# Patient Record
Sex: FEMALE | Race: BLACK OR AFRICAN AMERICAN | NOT HISPANIC OR LATINO | Employment: FULL TIME | URBAN - METROPOLITAN AREA
[De-identification: names, ages, dates, MRNs, and addresses within clinical notes are randomized per-mention and may not be internally consistent; named-entity substitution may affect disease eponyms.]

---

## 2019-10-24 ENCOUNTER — TELEPHONE (OUTPATIENT)
Dept: OBSTETRICS AND GYNECOLOGY | Facility: CLINIC | Age: 34
End: 2019-10-24

## 2019-10-24 ENCOUNTER — OFFICE VISIT (OUTPATIENT)
Dept: OBSTETRICS AND GYNECOLOGY | Facility: CLINIC | Age: 34
End: 2019-10-24
Payer: COMMERCIAL

## 2019-10-24 VITALS
WEIGHT: 163.56 LBS | BODY MASS INDEX: 27.25 KG/M2 | HEIGHT: 65 IN | SYSTOLIC BLOOD PRESSURE: 116 MMHG | DIASTOLIC BLOOD PRESSURE: 72 MMHG

## 2019-10-24 DIAGNOSIS — Z01.419 ENCOUNTER FOR GYNECOLOGICAL EXAMINATION (GENERAL) (ROUTINE) WITHOUT ABNORMAL FINDINGS: ICD-10-CM

## 2019-10-24 DIAGNOSIS — L29.2 VULVAR ITCHING: Primary | ICD-10-CM

## 2019-10-24 DIAGNOSIS — Z30.09 ENCOUNTER FOR OTHER GENERAL COUNSELING OR ADVICE ON CONTRACEPTION: ICD-10-CM

## 2019-10-24 DIAGNOSIS — Z12.4 SCREENING FOR CERVICAL CANCER: ICD-10-CM

## 2019-10-24 PROCEDURE — 88175 CYTOPATH C/V AUTO FLUID REDO: CPT

## 2019-10-24 PROCEDURE — 99385 PR PREVENTIVE VISIT,NEW,18-39: ICD-10-PCS | Mod: S$GLB,,, | Performed by: OBSTETRICS & GYNECOLOGY

## 2019-10-24 PROCEDURE — 87481 CANDIDA DNA AMP PROBE: CPT | Mod: 59

## 2019-10-24 PROCEDURE — 99999 PR PBB SHADOW E&M-NEW PATIENT-LVL III: CPT | Mod: PBBFAC,,, | Performed by: OBSTETRICS & GYNECOLOGY

## 2019-10-24 PROCEDURE — 99999 PR PBB SHADOW E&M-NEW PATIENT-LVL III: ICD-10-PCS | Mod: PBBFAC,,, | Performed by: OBSTETRICS & GYNECOLOGY

## 2019-10-24 PROCEDURE — 99385 PREV VISIT NEW AGE 18-39: CPT | Mod: S$GLB,,, | Performed by: OBSTETRICS & GYNECOLOGY

## 2019-10-24 PROCEDURE — 87661 TRICHOMONAS VAGINALIS AMPLIF: CPT

## 2019-10-24 PROCEDURE — 87801 DETECT AGNT MULT DNA AMPLI: CPT

## 2019-10-24 RX ORDER — NYSTATIN AND TRIAMCINOLONE ACETONIDE 100000; 1 [USP'U]/G; MG/G
CREAM TOPICAL
Qty: 60 G | Refills: 1 | Status: SHIPPED | OUTPATIENT
Start: 2019-10-24 | End: 2021-11-11

## 2019-10-24 RX ORDER — METFORMIN HYDROCHLORIDE 500 MG/1
500 TABLET ORAL 2 TIMES DAILY WITH MEALS
COMMUNITY
End: 2019-10-31 | Stop reason: SINTOL

## 2019-10-24 RX ORDER — FLUCONAZOLE 200 MG/1
200 TABLET ORAL DAILY
Qty: 3 TABLET | Refills: 0 | Status: SHIPPED | OUTPATIENT
Start: 2019-10-24 | End: 2019-10-27

## 2019-10-24 NOTE — PROGRESS NOTES
Subjective:       Patient ID: Wilberto Roger is a 33 y.o. female.    Chief Complaint:  Well Woman      History of Present Illness  HPI  Annual Exam-Premenopausal  Patient presents for annual exam. The patient c/o yeast infection--used 3 d monistat; --feels vulvar itching. Also interested in starting contraception; The patient is sexually active--no condoms, current partner of 6 yrs; . GYN screening history: last pap: was normal and patient does not recall when last pap was. The patient wears seatbelts: yes. The patient participates in regular exercise: yes. --walks daily 1 hr; Has the patient ever been transfused or tattooed?: yes. --+tattooes; The patient reports that there is not domestic violence in her life.      Stopped depo--2019; had 2 menses in October; flow 2 days, 5 days; (prior to depo--cycles 3 days); pads-overnight pad --change q 4 hrs (no double up); min dysmenorrhea;     Denies urinary leakage with cough/sneeze    Works for [employer]    Had gest dm with 3 yr old son and currently dx with diabetes but cannot take metformin consistently due to diarrhea from metformin and her job  GYN & OB History  Patient's last menstrual period was 10/20/2019 (exact date).   Date of Last Pap: No result found    OB History    Para Term  AB Living   1 1 1     1   SAB TAB Ectopic Multiple Live Births           1      # Outcome Date GA Lbr Eliceo/2nd Weight Sex Delivery Anes PTL Lv   1 Term      Vag-Spont   MISSY       Review of Systems  Review of Systems   Genitourinary: Positive for menstrual problem and vaginal discharge.   All other systems reviewed and are negative.          Objective:      Physical Exam:   Constitutional: She appears well-developed.     Eyes: Pupils are equal, round, and reactive to light. Conjunctivae and EOM are normal.    Neck: Normal range of motion. Neck supple.     Pulmonary/Chest: Effort normal. Right breast exhibits no mass, no nipple discharge, no skin change and no  tenderness. Left breast exhibits no mass, no nipple discharge, no skin change and no tenderness. Breasts are symmetrical.        Abdominal: Soft.     Genitourinary: Rectum normal, vagina normal and uterus normal. Pelvic exam was performed with patient supine. Cervix is normal. Right adnexum displays no mass and no tenderness. Left adnexum displays no mass and no tenderness. No erythema, bleeding, rectocele, cystocele or unspecified prolapse of vaginal walls in the vagina. No vaginal discharge (menstrual like blood in vagina, thick discharge) found. Labial bartholins normal.       Uterus Size: 6 cm   Musculoskeletal: Normal range of motion.       Neurological: She is alert.    Skin: Skin is warm.    Psychiatric: She has a normal mood and affect.           Assessment:     Encounter Diagnoses   Name Primary?    Encounter for gynecological examination (general) (routine) without abnormal findings     Screening for cervical cancer     Vulvar itching Yes    Encounter for other general counseling or advice on contraception                  Plan:      Continue annual well woman exam.  Pap today; Reviewed updated recommendations for pap smears (every 3 years) in low risk patients.   Recommend annual pelvic exams.  Reviewed recommendations for annual CBE.  Reviewed contraceptive options--ocp, depo, nuva ring, nexplanon, iud, patch  Reviewed uses of each, risks and benefits.  Pt elects trial of nexplanon, orders faxed; safe sex; return on menses for insertion  Continue menstrual calendar  Mycolog/dilfucan for vulvar irritation  Advised avoid sugary drinks, follow diabetic diet  Pt to f/u with dr fowler for pcp  Continue diet, exercise, weight loss

## 2019-10-25 LAB
BACTERIAL VAGINOSIS DNA: NEGATIVE
CANDIDA GLABRATA DNA: NEGATIVE
CANDIDA KRUSEI DNA: NEGATIVE
CANDIDA RRNA VAG QL PROBE: POSITIVE
T VAGINALIS RRNA GENITAL QL PROBE: NEGATIVE

## 2019-10-28 ENCOUNTER — TELEPHONE (OUTPATIENT)
Dept: OBSTETRICS AND GYNECOLOGY | Facility: CLINIC | Age: 34
End: 2019-10-28

## 2019-10-28 DIAGNOSIS — B37.31 YEAST VAGINITIS: Primary | ICD-10-CM

## 2019-10-28 RX ORDER — FLUCONAZOLE 200 MG/1
200 TABLET ORAL DAILY
Qty: 3 TABLET | Refills: 0 | Status: SHIPPED | OUTPATIENT
Start: 2019-10-28 | End: 2019-10-31

## 2019-10-28 NOTE — TELEPHONE ENCOUNTER
----- Message from Taisha Uribe MD sent at 10/28/2019  8:36 AM CDT -----  The   vaginal culture is positive for yeast, a rx has been sent

## 2019-10-28 NOTE — TELEPHONE ENCOUNTER
Spoke to patient and let her know that her vaginal culture is positive for yeast, rx has been sent to pharmacy. Verified pharmacy. Patient verbalized understanding.

## 2019-10-31 ENCOUNTER — LAB VISIT (OUTPATIENT)
Dept: LAB | Facility: HOSPITAL | Age: 34
End: 2019-10-31
Attending: INTERNAL MEDICINE
Payer: COMMERCIAL

## 2019-10-31 ENCOUNTER — OFFICE VISIT (OUTPATIENT)
Dept: INTERNAL MEDICINE | Facility: CLINIC | Age: 34
End: 2019-10-31
Payer: COMMERCIAL

## 2019-10-31 VITALS
DIASTOLIC BLOOD PRESSURE: 79 MMHG | HEIGHT: 65 IN | SYSTOLIC BLOOD PRESSURE: 127 MMHG | TEMPERATURE: 98 F | HEART RATE: 91 BPM | OXYGEN SATURATION: 100 % | BODY MASS INDEX: 27.9 KG/M2 | RESPIRATION RATE: 20 BRPM | WEIGHT: 167.44 LBS

## 2019-10-31 DIAGNOSIS — E11.9 DIABETES MELLITUS TYPE 2 IN NONOBESE: ICD-10-CM

## 2019-10-31 DIAGNOSIS — Z76.89 ENCOUNTER TO ESTABLISH CARE: ICD-10-CM

## 2019-10-31 DIAGNOSIS — Z76.89 ENCOUNTER TO ESTABLISH CARE: Primary | ICD-10-CM

## 2019-10-31 LAB
25(OH)D3+25(OH)D2 SERPL-MCNC: 10 NG/ML (ref 30–96)
ALBUMIN SERPL BCP-MCNC: 3.5 G/DL (ref 3.5–5.2)
ALP SERPL-CCNC: 123 U/L (ref 55–135)
ALT SERPL W/O P-5'-P-CCNC: 14 U/L (ref 10–44)
ANION GAP SERPL CALC-SCNC: 13 MMOL/L (ref 8–16)
AST SERPL-CCNC: 13 U/L (ref 10–40)
BASOPHILS # BLD AUTO: 0.03 K/UL (ref 0–0.2)
BASOPHILS NFR BLD: 0.5 % (ref 0–1.9)
BILIRUB SERPL-MCNC: 0.3 MG/DL (ref 0.1–1)
BUN SERPL-MCNC: 6 MG/DL (ref 6–20)
CALCIUM SERPL-MCNC: 9.7 MG/DL (ref 8.7–10.5)
CHLORIDE SERPL-SCNC: 97 MMOL/L (ref 95–110)
CHOLEST SERPL-MCNC: 358 MG/DL (ref 120–199)
CHOLEST/HDLC SERPL: 9.4 {RATIO} (ref 2–5)
CO2 SERPL-SCNC: 23 MMOL/L (ref 23–29)
CREAT SERPL-MCNC: 0.8 MG/DL (ref 0.5–1.4)
DIFFERENTIAL METHOD: ABNORMAL
EOSINOPHIL # BLD AUTO: 0.1 K/UL (ref 0–0.5)
EOSINOPHIL NFR BLD: 1 % (ref 0–8)
ERYTHROCYTE [DISTWIDTH] IN BLOOD BY AUTOMATED COUNT: 12.5 % (ref 11.5–14.5)
EST. GFR  (AFRICAN AMERICAN): >60 ML/MIN/1.73 M^2
EST. GFR  (NON AFRICAN AMERICAN): >60 ML/MIN/1.73 M^2
ESTIMATED AVG GLUCOSE: ABNORMAL MG/DL (ref 68–131)
GLUCOSE SERPL-MCNC: 391 MG/DL (ref 70–110)
HBA1C MFR BLD HPLC: >14 % (ref 4–5.6)
HCT VFR BLD AUTO: 41.2 % (ref 37–48.5)
HDLC SERPL-MCNC: 38 MG/DL (ref 40–75)
HDLC SERPL: 10.6 % (ref 20–50)
HGB BLD-MCNC: 12.2 G/DL (ref 12–16)
IMM GRANULOCYTES # BLD AUTO: 0.01 K/UL (ref 0–0.04)
IMM GRANULOCYTES NFR BLD AUTO: 0.2 % (ref 0–0.5)
LDLC SERPL CALC-MCNC: ABNORMAL MG/DL (ref 63–159)
LYMPHOCYTES # BLD AUTO: 2.5 K/UL (ref 1–4.8)
LYMPHOCYTES NFR BLD: 43.8 % (ref 18–48)
MCH RBC QN AUTO: 22.4 PG (ref 27–31)
MCHC RBC AUTO-ENTMCNC: 29.6 G/DL (ref 32–36)
MCV RBC AUTO: 76 FL (ref 82–98)
MONOCYTES # BLD AUTO: 0.6 K/UL (ref 0.3–1)
MONOCYTES NFR BLD: 9.7 % (ref 4–15)
NEUTROPHILS # BLD AUTO: 2.6 K/UL (ref 1.8–7.7)
NEUTROPHILS NFR BLD: 44.8 % (ref 38–73)
NONHDLC SERPL-MCNC: 320 MG/DL
NRBC BLD-RTO: 0 /100 WBC
PLATELET # BLD AUTO: 432 K/UL (ref 150–350)
PMV BLD AUTO: 11 FL (ref 9.2–12.9)
POTASSIUM SERPL-SCNC: 4.5 MMOL/L (ref 3.5–5.1)
PROT SERPL-MCNC: 7.7 G/DL (ref 6–8.4)
RBC # BLD AUTO: 5.45 M/UL (ref 4–5.4)
SODIUM SERPL-SCNC: 133 MMOL/L (ref 136–145)
TRIGL SERPL-MCNC: 1261 MG/DL (ref 30–150)
WBC # BLD AUTO: 5.78 K/UL (ref 3.9–12.7)

## 2019-10-31 PROCEDURE — 99999 PR PBB SHADOW E&M-EST. PATIENT-LVL III: ICD-10-PCS | Mod: PBBFAC,,, | Performed by: INTERNAL MEDICINE

## 2019-10-31 PROCEDURE — 85025 COMPLETE CBC W/AUTO DIFF WBC: CPT

## 2019-10-31 PROCEDURE — 80053 COMPREHEN METABOLIC PANEL: CPT

## 2019-10-31 PROCEDURE — 80061 LIPID PANEL: CPT

## 2019-10-31 PROCEDURE — 99204 OFFICE O/P NEW MOD 45 MIN: CPT | Mod: S$GLB,,, | Performed by: INTERNAL MEDICINE

## 2019-10-31 PROCEDURE — 3008F PR BODY MASS INDEX (BMI) DOCUMENTED: ICD-10-PCS | Mod: CPTII,S$GLB,, | Performed by: INTERNAL MEDICINE

## 2019-10-31 PROCEDURE — 99204 PR OFFICE/OUTPT VISIT, NEW, LEVL IV, 45-59 MIN: ICD-10-PCS | Mod: S$GLB,,, | Performed by: INTERNAL MEDICINE

## 2019-10-31 PROCEDURE — 36415 COLL VENOUS BLD VENIPUNCTURE: CPT | Mod: PO

## 2019-10-31 PROCEDURE — 82306 VITAMIN D 25 HYDROXY: CPT

## 2019-10-31 PROCEDURE — 3008F BODY MASS INDEX DOCD: CPT | Mod: CPTII,S$GLB,, | Performed by: INTERNAL MEDICINE

## 2019-10-31 PROCEDURE — 99999 PR PBB SHADOW E&M-EST. PATIENT-LVL III: CPT | Mod: PBBFAC,,, | Performed by: INTERNAL MEDICINE

## 2019-10-31 PROCEDURE — 83036 HEMOGLOBIN GLYCOSYLATED A1C: CPT

## 2019-10-31 RX ORDER — METFORMIN HYDROCHLORIDE 1000 MG/1
1000 TABLET, FILM COATED, EXTENDED RELEASE ORAL
Qty: 90 TABLET | Refills: 3 | Status: SHIPPED | OUTPATIENT
Start: 2019-10-31 | End: 2019-12-16 | Stop reason: DRUGHIGH

## 2019-10-31 NOTE — PROGRESS NOTES
Subjective:      Patient ID: Wilberto Roger is a 33 y.o. female.    Chief Complaint: [facility] Care and Diabetes      Ms. Wilberto Roger is a patient of [facility] in [place], [place], who presents to [facility] primary care.    HPI     She reports having DM2 dx'd 1 year ago, at which time she was prescribed metformin 500 mg BID, which caused diarrhea, so she stopped it and was never started on another medication. No increased hunger, but endorses polyuria. No dysuria.       Past Medical History:   Diagnosis Date    Diabetes mellitus type 2 in nonobese 10/31/2018    Stopped metformin 2/2 diarrhea     History reviewed. No pertinent surgical history.  Social History     Socioeconomic History    Marital status: Single     Spouse name: Not on file    Number of children: Not on file    Years of education: Not on file    Highest education level: Not on file   Occupational History    Not on file   Social Needs    Financial resource strain: Not on file    Food insecurity:     Worry: Not on file     Inability: Not on file    Transportation needs:     Medical: Not on file     Non-medical: Not on file   Tobacco Use    Smoking status: Never Smoker    Smokeless tobacco: Never Used   Substance and Sexual Activity    Alcohol use: Never     Frequency: Never    Drug use: Never    Sexual activity: Yes     Partners: Male     Birth control/protection: None   Lifestyle    Physical activity:     Days per week: Not on file     Minutes per session: Not on file    Stress: Not on file   Relationships    Social connections:     Talks on phone: Not on file     Gets together: Not on file     Attends Anabaptism service: Not on file     Active member of club or organization: Not on file     Attends meetings of clubs or organizations: Not on file     Relationship status: Not on file   Other Topics Concern    Not on file   Social History Narrative    Patient goal(s): Weight 150 lb by 4/30/2020    Motivation: Wants to be more  "active with her 3 year old and cure dm2 and be around long-term for him     Breakfast: 10%: croissant, egg & cheese or egg & cheese wrap; Powerade    Lunch: Pork chops, gravy & peas; water    Dinner: Fennel cake; lemonade    Snacks: None    Eating out: 3x/wk    Water (oz/day): 84 oz/d    Physical Activity: walks 1 hr/d at work     Goals    None       Family History   Problem Relation Age of Onset    Diabetes Father     Blindness Father     Breast cancer Maternal Grandmother     Breast cancer Maternal Aunt 45       Current Outpatient Medications:     fluconazole (DIFLUCAN) 200 MG Tab, Take 1 tablet (200 mg total) by mouth once daily. for 3 doses, Disp: 3 tablet, Rfl: 0    nystatin-triamcinolone (MYCOLOG II) cream, Apply to affected area 2 times daily, Disp: 60 g, Rfl: 1    metFORMIN (GLUMETZA) 1000 MG (MOD) 24 hr tablet, Take 1 tablet (1,000 mg total) by mouth daily with breakfast., Disp: 90 tablet, Rfl: 3    Review of patient's allergies indicates:   Allergen Reactions    Strawberries [strawberry]        Review of Systems   All remaining systems negative    Objective:     /79   Pulse 91   Temp 98 °F (36.7 °C) (Oral)   Resp 20   Ht 5' 5" (1.651 m)   Wt 76 kg (167 lb 7 oz)   LMP 10/20/2019 (Exact Date)   SpO2 100%   BMI 27.86 kg/m²     Physical Exam  GEN: A&O fully, NAD  PSYC: Normal affect      Assessment:      1. Diabetes mellitus type 2 in nonobese: Will check labs today. Risks and benefits discussed and patient chose to move forward with long acting metformin 1000 mg 1 po qd.         Plan:   Encounter to establish care  -     Hemoglobin A1c; Future; Expected date: 10/31/2019  -     CBC auto differential; Future; Expected date: 10/31/2019  -     Comprehensive metabolic panel; Future; Expected date: 10/31/2019  -     Lipid panel; Future; Expected date: 10/31/2019  -     Vitamin D; Future; Expected date: 10/31/2019    Diabetes mellitus type 2 in nonobese  -     metFORMIN (GLUMETZA) 1000 MG (MOD) " 24 hr tablet; Take 1 tablet (1,000 mg total) by mouth daily with breakfast.  Dispense: 90 tablet; Refill: 3        Follow up in about 6 weeks (around 12/12/2019), or if symptoms worsen or fail to improve, for Annual.      I spent >45 minutes of time with patient 50% or more of which was discussing labs and plans of care.

## 2019-11-01 DIAGNOSIS — E08.65 DIABETES MELLITUS DUE TO UNDERLYING CONDITION, UNCONTROLLED, WITH HYPERGLYCEMIA: Primary | ICD-10-CM

## 2019-11-01 PROBLEM — E78.1 HYPERTRIGLYCERIDEMIA: Status: ACTIVE | Noted: 2019-11-01

## 2019-11-01 PROBLEM — E11.9 DIABETES MELLITUS TYPE 2 IN NONOBESE: Status: ACTIVE | Noted: 2018-10-31

## 2019-11-01 RX ORDER — ATORVASTATIN CALCIUM 20 MG/1
20 TABLET, FILM COATED ORAL DAILY
Qty: 30 TABLET | Refills: 1 | Status: SHIPPED | OUTPATIENT
Start: 2019-11-01 | End: 2020-06-23 | Stop reason: SDUPTHER

## 2019-11-01 RX ORDER — INSULIN GLARGINE 100 [IU]/ML
7 INJECTION, SOLUTION SUBCUTANEOUS NIGHTLY
Qty: 15 ML | Refills: 11 | Status: SHIPPED | OUTPATIENT
Start: 2019-11-01 | End: 2020-06-23

## 2019-11-04 ENCOUNTER — TELEPHONE (OUTPATIENT)
Dept: OBSTETRICS AND GYNECOLOGY | Facility: CLINIC | Age: 34
End: 2019-11-04

## 2019-11-07 ENCOUNTER — TELEPHONE (OUTPATIENT)
Dept: OBSTETRICS AND GYNECOLOGY | Facility: CLINIC | Age: 34
End: 2019-11-07

## 2019-11-07 NOTE — TELEPHONE ENCOUNTER
Received paperwork that pt.'s nexplanon is not covered un [facility] pharmacy they are forwarding it to [facility] for processing. Scanned letter into the pt.'s chart. slee,lpn

## 2019-11-11 ENCOUNTER — TELEPHONE (OUTPATIENT)
Dept: OBSTETRICS AND GYNECOLOGY | Facility: CLINIC | Age: 34
End: 2019-11-11

## 2019-11-11 NOTE — TELEPHONE ENCOUNTER
----- Message from Estefania Glass sent at 11/11/2019  3:06 PM CST -----  Contact: Davina harris/Keli  Calling concerning scheduling delivery of Nexplanon device for patient. Please call @ [phone] Option 2 7:am - 7:pm m-f. To schedule appointment. Thanks, sarah

## 2019-12-16 ENCOUNTER — OFFICE VISIT (OUTPATIENT)
Dept: INTERNAL MEDICINE | Facility: CLINIC | Age: 34
End: 2019-12-16
Payer: COMMERCIAL

## 2019-12-16 VITALS
DIASTOLIC BLOOD PRESSURE: 76 MMHG | HEART RATE: 90 BPM | SYSTOLIC BLOOD PRESSURE: 128 MMHG | WEIGHT: 174.63 LBS | TEMPERATURE: 98 F | HEIGHT: 65 IN | BODY MASS INDEX: 29.09 KG/M2 | OXYGEN SATURATION: 98 %

## 2019-12-16 DIAGNOSIS — E11.9 DIABETES MELLITUS TYPE 2 IN NONOBESE: Primary | ICD-10-CM

## 2019-12-16 PROCEDURE — 82043 UR ALBUMIN QUANTITATIVE: CPT

## 2019-12-16 PROCEDURE — 3008F BODY MASS INDEX DOCD: CPT | Mod: CPTII,S$GLB,, | Performed by: INTERNAL MEDICINE

## 2019-12-16 PROCEDURE — 99999 PR PBB SHADOW E&M-EST. PATIENT-LVL III: CPT | Mod: PBBFAC,,, | Performed by: INTERNAL MEDICINE

## 2019-12-16 PROCEDURE — 99214 PR OFFICE/OUTPT VISIT, EST, LEVL IV, 30-39 MIN: ICD-10-PCS | Mod: S$GLB,,, | Performed by: INTERNAL MEDICINE

## 2019-12-16 PROCEDURE — 99214 OFFICE O/P EST MOD 30 MIN: CPT | Mod: S$GLB,,, | Performed by: INTERNAL MEDICINE

## 2019-12-16 PROCEDURE — 99999 PR PBB SHADOW E&M-EST. PATIENT-LVL III: ICD-10-PCS | Mod: PBBFAC,,, | Performed by: INTERNAL MEDICINE

## 2019-12-16 PROCEDURE — 3008F PR BODY MASS INDEX (BMI) DOCUMENTED: ICD-10-PCS | Mod: CPTII,S$GLB,, | Performed by: INTERNAL MEDICINE

## 2019-12-16 RX ORDER — METFORMIN HYDROCHLORIDE 500 MG/1
TABLET, EXTENDED RELEASE ORAL
Refills: 3 | COMMUNITY
Start: 2019-10-31 | End: 2019-12-16 | Stop reason: DRUGHIGH

## 2019-12-16 RX ORDER — METFORMIN HYDROCHLORIDE 850 MG/1
850 TABLET ORAL 2 TIMES DAILY WITH MEALS
Qty: 180 TABLET | Refills: 3 | Status: SHIPPED | OUTPATIENT
Start: 2019-12-16 | End: 2021-11-11

## 2019-12-16 RX ORDER — PEN NEEDLE, DIABETIC 32GX 5/32"
NEEDLE, DISPOSABLE MISCELLANEOUS
Refills: 0 | COMMUNITY
Start: 2019-11-05 | End: 2021-09-09 | Stop reason: SDUPTHER

## 2019-12-16 NOTE — PROGRESS NOTES
Subjective:      Patient ID: Wilberto Roger is a 34 y.o. female.    Chief Complaint: Annual Exam      HPI     Ms. Wilberto Roger is a patient of Gregorio Burton MD, who presents for Annual Exam    She reports now taking 35 U Lantus at night, which has resulted in FBGs in the 90's as of late.     VS, labs & imaging reviewed and discussed with patient, including A1c >14% on 10/31/19.    Of note, EPIC indicates due preventive measures, including EYE, which she plans to schedule and FOOT she prefers to defer until next visit.       Past Medical History:   Diagnosis Date    Diabetes mellitus type 2 in nonobese 10/31/2018    A1c >14% as of 11/1/19; Stopped metformin 2/2 diarrhea -> Lantus & atorvastatin on 11/1/19    Hypertriglyceridemia 11/01/2019    TG 1,261 mg/dL, for which we will consider fenofibrate if atorvastatin doesn't causes significant transaminitis (will also consider Lovaza)    Overweight (BMI 25.0-29.9)      History reviewed. No pertinent surgical history.  Social History     Socioeconomic History    Marital status: Single     Spouse name: Not on file    Number of children: Not on file    Years of education: Not on file    Highest education level: Not on file   Occupational History    Not on file   Social Needs    Financial resource strain: Not on file    Food insecurity:     Worry: Not on file     Inability: Not on file    Transportation needs:     Medical: Not on file     Non-medical: Not on file   Tobacco Use    Smoking status: Never Smoker    Smokeless tobacco: Never Used   Substance and Sexual Activity    Alcohol use: Never     Frequency: Never    Drug use: Never    Sexual activity: Yes     Partners: Male     Birth control/protection: None   Lifestyle    Physical activity:     Days per week: Not on file     Minutes per session: Not on file    Stress: Not on file   Relationships    Social connections:     Talks on phone: Not on file     Gets together: Not on file     Attends [employer]  "service: Not on file     Active member of club or organization: Not on file     Attends meetings of clubs or organizations: Not on file     Relationship status: Not on file   Other Topics Concern    Not on file   Social History Narrative    Patient goal(s): Weight 150 lb by 4/30/2020    Motivation: Wants to be more active with her 3 year old and cure dm2 and be around long-term for him     Breakfast: 10%: croissant, egg & cheese or egg & cheese wrap; Powerade    Lunch: Pork chops, gravy & peas; water    Dinner: Fennel cake; lemonade (previously Cokes: 5 cans/d)    Snacks: None    Eating out: 3x/wk    Water (oz/day): 84 oz/d    Physical Activity: walks 1 hr/d at work     Family History   Problem Relation Age of Onset    Diabetes Father     Blindness Father     Breast cancer Maternal Grandmother     Breast cancer Maternal Aunt 45       Current Outpatient Medications:     atorvastatin (LIPITOR) 20 MG tablet, Take 1 tablet (20 mg total) by mouth once daily., Disp: 30 tablet, Rfl: 1    insulin (LANTUS SOLOSTAR U-100 INSULIN) glargine 100 units/mL (3mL) SubQ pen, Inject 7 Units into the skin every evening. If 7 day ave. FBG>140, increase by 7U for 7 days. If 3 day aFBG<70, decrease by 3U for 3 days., Disp: 15 mL, Rfl: 11    nystatin-triamcinolone (MYCOLOG II) cream, Apply to affected area 2 times daily, Disp: 60 g, Rfl: 1    ULTICARE PEN NEEDLE 32 gauge x 5/32" Ndle, USE DAILY WITH INSULIN, Disp: , Rfl: 0    metFORMIN (GLUCOPHAGE) 850 MG tablet, Take 1 tablet (850 mg total) by mouth 2 (two) times daily with meals., Disp: 180 tablet, Rfl: 3    Review of patient's allergies indicates:   Allergen Reactions    Strawberries [strawberry]         Review of Systems   All remaining systems negative    Objective:     /76 (BP Location: Left arm, Patient Position: Sitting, BP Method: Medium (Manual))   Pulse 90   Temp 97.9 °F (36.6 °C) (Temporal)   Ht 5' 5" (1.651 m)   Wt 79.2 kg (174 lb 9.7 oz)   LMP 11/25/2019 "   SpO2 98%   BMI 29.06 kg/m²     Physical Exam  GEN: A&O fully, NAD  PSYC: Normal affect      Lab Results   Component Value Date    WBC 5.78 10/31/2019    HGB 12.2 10/31/2019    HCT 41.2 10/31/2019     (H) 10/31/2019    CHOL 358 (H) 10/31/2019    TRIG 1,261 (H) 10/31/2019    HDL 38 (L) 10/31/2019    LDLCALC Invalid, Trig>400.0 10/31/2019    ALT 14 10/31/2019    AST 13 10/31/2019     (L) 10/31/2019    K 4.5 10/31/2019    CL 97 10/31/2019    CREATININE 0.8 10/31/2019    BUN 6 10/31/2019    CO2 23 10/31/2019    CALCIUM 9.7 10/31/2019    HGBA1C >14.0 (H) 10/31/2019       Assessment:      1. Diabetes mellitus type 2 in nonobese: Likely improving given FBGs in 90's and having quit sodas completely.        Plan:   Diabetes mellitus type 2 in nonobese  -     metFORMIN (GLUCOPHAGE) 850 MG tablet; Take 1 tablet (850 mg total) by mouth 2 (two) times daily with meals.  Dispense: 180 tablet; Refill: 3  -     Microalbumin/creatinine urine ratio  -     Hemoglobin A1c; Future; Expected date: 12/16/2019  -     Lipid panel; Future; Expected date: 12/16/2019  -     Comprehensive metabolic panel; Future; Expected date: 12/16/2019      Follow up in about 4 weeks (around 1/13/2020), or if symptoms worsen or fail to improve, for FU on DM2.

## 2019-12-17 LAB
ALBUMIN/CREAT UR: 8.9 UG/MG (ref 0–30)
CREAT UR-MCNC: 79 MG/DL (ref 15–325)
MICROALBUMIN UR DL<=1MG/L-MCNC: 7 UG/ML

## 2020-01-02 ENCOUNTER — PATIENT OUTREACH (OUTPATIENT)
Dept: ADMINISTRATIVE | Facility: HOSPITAL | Age: 35
End: 2020-01-02

## 2020-01-02 NOTE — LETTER
January 2, 2020        Wilberto Roger  [address] [place] [id number]      Dear Ms. Rgoer,    You have an upcoming appointment with Gregorio Burton MD on 01/16/20.      Your chart is indicating you may be due for the following and I will be happy to assist you in scheduling any needed appointments:  Health Maintenance Due   Topic    Foot Exam     Eye Exam           If you have had any of the above done at another facility, please bring the records or information with you so that your record at [facility] will be complete.    We will be happy to assist you with scheduling any necessary appointments or you may contact the [facility] appointment desk at [phone] to schedule at your convenience.     Thank you for choosing [facility] for your healthcare needs,      Annie C., LPN Care Coordinator  [facility]  [phone]

## 2020-04-13 ENCOUNTER — TELEPHONE (OUTPATIENT)
Dept: INTERNAL MEDICINE | Facility: CLINIC | Age: 35
End: 2020-04-13

## 2020-04-13 NOTE — TELEPHONE ENCOUNTER
----- Message from Sher Aceves sent at 4/13/2020  1:55 PM CDT -----  ..Type:  Needs Medical Advice    Who Called: pt   Symptoms (please be specific): How long has patient had these symptoms:  Staph infection   Pharmacy name and phone #:  ..  [facility] [address] [address]  Phone: [phone] Fax: [phone]    Would the patient rather a call back or a response via [facility]? Call back   Best Call Back Number: [phone]  Additional Information: Pt is requesting a call from nurse to discuss she has a staph infection the size of a tennis ball

## 2020-06-10 ENCOUNTER — PATIENT OUTREACH (OUTPATIENT)
Dept: ADMINISTRATIVE | Facility: HOSPITAL | Age: 35
End: 2020-06-10

## 2020-06-10 NOTE — PROGRESS NOTES
I spoke with pt that did schedule her Diabetic Eye Exam.     Health Maintenance Updated.   Immunizations: Abstracted.  Care Everywhere: Abstracted:Results Updated.    Health Maintenance Due   Topic    Foot Exam     Eye Exam     Hemoglobin A1c      DM EYE:SARATH HA1c:DUE DM FOOT:DUE

## 2020-06-23 ENCOUNTER — OFFICE VISIT (OUTPATIENT)
Dept: INTERNAL MEDICINE | Facility: CLINIC | Age: 35
End: 2020-06-23
Payer: COMMERCIAL

## 2020-06-23 ENCOUNTER — TELEPHONE (OUTPATIENT)
Dept: INTERNAL MEDICINE | Facility: CLINIC | Age: 35
End: 2020-06-23

## 2020-06-23 VITALS
HEIGHT: 65 IN | BODY MASS INDEX: 31.19 KG/M2 | WEIGHT: 187.19 LBS | SYSTOLIC BLOOD PRESSURE: 132 MMHG | TEMPERATURE: 99 F | HEART RATE: 94 BPM | OXYGEN SATURATION: 98 % | DIASTOLIC BLOOD PRESSURE: 88 MMHG | RESPIRATION RATE: 18 BRPM

## 2020-06-23 DIAGNOSIS — E11.9 DIABETES MELLITUS TYPE 2 IN NONOBESE: ICD-10-CM

## 2020-06-23 DIAGNOSIS — E08.65 DIABETES MELLITUS DUE TO UNDERLYING CONDITION, UNCONTROLLED, WITH HYPERGLYCEMIA: ICD-10-CM

## 2020-06-23 DIAGNOSIS — Z11.4 ENCOUNTER FOR SCREENING FOR HIV: Primary | ICD-10-CM

## 2020-06-23 PROCEDURE — 99999 PR PBB SHADOW E&M-EST. PATIENT-LVL III: ICD-10-PCS | Mod: PBBFAC,,, | Performed by: FAMILY MEDICINE

## 2020-06-23 PROCEDURE — 99214 PR OFFICE/OUTPT VISIT, EST, LEVL IV, 30-39 MIN: ICD-10-PCS | Mod: S$GLB,,, | Performed by: FAMILY MEDICINE

## 2020-06-23 PROCEDURE — 3008F PR BODY MASS INDEX (BMI) DOCUMENTED: ICD-10-PCS | Mod: CPTII,S$GLB,, | Performed by: FAMILY MEDICINE

## 2020-06-23 PROCEDURE — 3008F BODY MASS INDEX DOCD: CPT | Mod: CPTII,S$GLB,, | Performed by: FAMILY MEDICINE

## 2020-06-23 PROCEDURE — 99214 OFFICE O/P EST MOD 30 MIN: CPT | Mod: S$GLB,,, | Performed by: FAMILY MEDICINE

## 2020-06-23 PROCEDURE — 99999 PR PBB SHADOW E&M-EST. PATIENT-LVL III: CPT | Mod: PBBFAC,,, | Performed by: FAMILY MEDICINE

## 2020-06-23 RX ORDER — ATORVASTATIN CALCIUM 20 MG/1
20 TABLET, FILM COATED ORAL DAILY
Qty: 90 TABLET | Refills: 3 | Status: SHIPPED | OUTPATIENT
Start: 2020-06-23 | End: 2021-09-23 | Stop reason: SDUPTHER

## 2020-06-23 RX ORDER — INSULIN GLARGINE 300 [IU]/ML
70 INJECTION, SOLUTION SUBCUTANEOUS NIGHTLY
Qty: 21 ML | Refills: 3 | Status: SHIPPED | OUTPATIENT
Start: 2020-06-23 | End: 2020-06-24

## 2020-06-23 NOTE — ASSESSMENT & PLAN NOTE
Decided to defer hemoglobin A1c for now and will repeat this in 3 months.  Asking her to submit glucose readings at minimum from checking once a day via my chart.  Starting on Trulicity and changing to the Toujeo.  Continue on metformin and focus on lifestyle

## 2020-06-23 NOTE — PROGRESS NOTES
Subjective:       Patient ID: Wilberto Roger is a 34 y.o. female.    Chief Complaint: Establish Care and Diabetes    HPI  Came in today to discuss diabetic care.  She has been out of her Lantus for about a month.  Before that she was doing 70 units nightly.  Was recently diagnosed with diabetes less than a year ago.  Has been taking metformin consistently.  Exercising intermittently and trying to adhere to healthy diet overall.    Diabetes Management Status    Statin: Taking  ACE/ARB: Not taking    Screening or Prevention Patient's value Goal Complete/Controlled?   HgA1C Testing and Control   Lab Results   Component Value Date    HGBA1C >14.0 (H) 10/31/2019      Annually/Less than 8% Yes   Lipid profile : 10/31/2019 Annually Yes   LDL control Lab Results   Component Value Date    LDLCALC Invalid, Trig>400.0 10/31/2019    Annually/Less than 100 mg/dl  Yes   Nephropathy screening Lab Results   Component Value Date    LABMICR 7.0 12/16/2019     No results found for: PROTEINUA Annually Yes   Blood pressure BP Readings from Last 1 Encounters:   06/23/20 132/88    Less than 140/90 Yes   Dilated retinal exam Most Recent Eye Exam Date: Not Found. Has  Appointment next month Annually No   Foot exam   : 06/23/2020 Annually Yes       She works for [employer] and has a 4-year-old son.  Recently bought a new house in [place].    Family History   Problem Relation Age of Onset    Diabetes Father     Blindness Father     No Known Problems Mother     Breast cancer Maternal Grandmother     Breast cancer Maternal Aunt 45       Current Outpatient Medications:     atorvastatin (LIPITOR) 20 MG tablet, Take 1 tablet (20 mg total) by mouth once daily., Disp: 90 tablet, Rfl: 3    metFORMIN (GLUCOPHAGE) 850 MG tablet, Take 1 tablet (850 mg total) by mouth 2 (two) times daily with meals., Disp: 180 tablet, Rfl: 3    nystatin-triamcinolone (MYCOLOG II) cream, Apply to affected area 2 times daily, Disp: 60 g, Rfl: 1    ULTICARE  "PEN NEEDLE 32 gauge x 5/32" Ndle, USE DAILY WITH INSULIN, Disp: , Rfl: 0    dulaglutide (TRULICITY) 0.75 mg/0.5 mL PnIj, Inject 0.5 mLs (0.75 mg total) into the skin every 7 days., Disp: 6 mL, Rfl: 3    insulin glargine, TOUJEO, (TOUJEO) 300 unit/mL (1.5 mL) InPn pen, Inject 70 Units into the skin every evening., Disp: 21 mL, Rfl: 3    Review of Systems   Constitutional: Negative for chills and fever.   Respiratory: Negative for cough and shortness of breath.    Cardiovascular: Negative for chest pain.   Gastrointestinal: Negative for abdominal pain.   Skin: Negative for rash.   Neurological: Negative for dizziness.       Objective:   /88 (BP Location: Right arm, Patient Position: Sitting, BP Method: Medium (Manual))   Pulse 94   Temp 98.6 °F (37 °C) (Tympanic)   Resp 18   Ht 5' 5" (1.651 m)   Wt 84.9 kg (187 lb 2.7 oz)   LMP 06/19/2020   SpO2 98%   BMI 31.15 kg/m²      Physical Exam  Vitals signs reviewed.   Constitutional:       Appearance: She is well-developed.   HENT:      Head: Normocephalic and atraumatic.   Eyes:      Conjunctiva/sclera: Conjunctivae normal.   Cardiovascular:      Rate and Rhythm: Normal rate.   Pulmonary:      Effort: Pulmonary effort is normal. No respiratory distress.   Skin:     General: Skin is warm and dry.      Findings: No rash.   Neurological:      Mental Status: She is alert and oriented to person, place, and time.      Coordination: Coordination normal.   Psychiatric:         Behavior: Behavior normal.       FOOT EVALUATION: 10 gram monofilament exam with protective sensation intact bilaterally. Nails appropriately trimmed. No ulcers. Distal pulses palpable.    Assessment & Plan     Problem List Items Addressed This Visit        Endocrine    Diabetes mellitus type 2 in nonobese    Current Assessment & Plan     Decided to defer hemoglobin A1c for now and will repeat this in 3 months.  Asking her to submit glucose readings at minimum from checking once a day via my " chart.  Starting on Trulicity and changing to the Toujeo.  Continue on metformin and focus on lifestyle         Relevant Medications    insulin glargine, TOUJEO, (TOUJEO) 300 unit/mL (1.5 mL) InPn pen    dulaglutide (TRULICITY) 0.75 mg/0.5 mL PnIj      Other Visit Diagnoses     Encounter for screening for HIV    -  Primary    Relevant Orders    HIV 1/2 Ag/Ab (4th Gen)    Diabetes mellitus due to underlying condition, uncontrolled, with hyperglycemia        Relevant Medications    atorvastatin (LIPITOR) 20 MG tablet    insulin glargine, TOUJEO, (TOUJEO) 300 unit/mL (1.5 mL) InPn pen    dulaglutide (TRULICITY) 0.75 mg/0.5 mL PnIj    Other Relevant Orders    Hemoglobin A1C    Comprehensive metabolic panel            No follow-ups on file.    Disclaimer:  This note may have been prepared using voice recognition software, it may have not been extensively proofed, as such there could be errors within the text such as sound alike errors.

## 2020-06-23 NOTE — TELEPHONE ENCOUNTER
----- Message from Galo Whitlock sent at 6/23/2020  3:20 PM CDT -----  Regarding: pt advice  Contact: Olga Quijano called to consult with nurse about her visit from today 06/23/2020. Please call pt back at [phone]. Thanks tp

## 2020-06-24 RX ORDER — INSULIN GLARGINE 100 [IU]/ML
70 INJECTION, SOLUTION SUBCUTANEOUS NIGHTLY
Qty: 63 ML | Refills: 3 | Status: SHIPPED | OUTPATIENT
Start: 2020-06-24 | End: 2021-09-09

## 2020-06-24 RX ORDER — SEMAGLUTIDE 1.34 MG/ML
INJECTION, SOLUTION SUBCUTANEOUS
Qty: 3 ML | Refills: 2 | Status: SHIPPED | OUTPATIENT
Start: 2020-06-24 | End: 2021-09-09 | Stop reason: SINTOL

## 2020-07-07 ENCOUNTER — PATIENT OUTREACH (OUTPATIENT)
Dept: ADMINISTRATIVE | Facility: OTHER | Age: 35
End: 2020-07-07

## 2020-07-08 ENCOUNTER — OFFICE VISIT (OUTPATIENT)
Dept: OPHTHALMOLOGY | Facility: CLINIC | Age: 35
End: 2020-07-08
Payer: COMMERCIAL

## 2020-07-08 DIAGNOSIS — E11.9 TYPE 2 DIABETES MELLITUS WITHOUT RETINOPATHY: Primary | ICD-10-CM

## 2020-07-08 PROCEDURE — 92004 COMPRE OPH EXAM NEW PT 1/>: CPT | Mod: S$GLB,,, | Performed by: OPTOMETRIST

## 2020-07-08 PROCEDURE — 99999 PR PBB SHADOW E&M-EST. PATIENT-LVL II: ICD-10-PCS | Mod: PBBFAC,,, | Performed by: OPTOMETRIST

## 2020-07-08 PROCEDURE — 92004 PR EYE EXAM, NEW PATIENT,COMPREHESV: ICD-10-PCS | Mod: S$GLB,,, | Performed by: OPTOMETRIST

## 2020-07-08 PROCEDURE — 99999 PR PBB SHADOW E&M-EST. PATIENT-LVL II: CPT | Mod: PBBFAC,,, | Performed by: OPTOMETRIST

## 2020-07-08 NOTE — PROGRESS NOTES
HPI     Diabetic Eye Exam     Comments: Yearly              Comments     New Patient  First Eye Exam   Diabetic eye exam  Diagnosed with diabetes in 2019  Recent vision fluctuations Yes  Lab Results       Component                Value               Date                       HGBA1C                   >14.0 (H)           10/31/2019              HPI    Any vision changes since last exam: Yes, decrease with overall vision  Eye pain: No  Other ocular symptoms: No    Do you wear currently wear glasses or contacts? None    Interested in contacts today? No    Do you plan on getting new glasses today? If Needed              Last edited by Sarah Foley on 7/8/2020  8:55 AM. (History)            Assessment /Plan     For exam results, see Encounter Report.    Type 2 diabetes mellitus without retinopathy      No diabetic retinopathy in either eye  Continue close care with PCP   Monitor 12 months    Excellent va OU today, discussed with pt that va fluctuations may be blood sugar related  RTC PRN with any va changes, otherwise:    RTC 1 yr for dilated eye exam or PRN if any problems.   Discussed above and answered questions.

## 2020-09-08 ENCOUNTER — PATIENT OUTREACH (OUTPATIENT)
Dept: ADMINISTRATIVE | Facility: HOSPITAL | Age: 35
End: 2020-09-08

## 2020-09-08 DIAGNOSIS — Z11.59 NEED FOR HEPATITIS C SCREENING TEST: Primary | ICD-10-CM

## 2020-09-08 NOTE — PROGRESS NOTES
Working A1c Report:     Pt currently scheduled for labs 09/17/2020 with F/U visit after on 09/23/2020. Added Hep s screen an linked.

## 2020-12-23 ENCOUNTER — PATIENT OUTREACH (OUTPATIENT)
Dept: ADMINISTRATIVE | Facility: HOSPITAL | Age: 35
End: 2020-12-23

## 2020-12-23 NOTE — PROGRESS NOTES
Hemoglobin A1C Report. Patient notified of overdue fasting labs and PCP appointment that is needed. Offered to schedule appointments, patient declined. Patient states she is unable to schedule any appointments at this time and will have to call back at a later date.

## 2021-01-21 ENCOUNTER — PATIENT OUTREACH (OUTPATIENT)
Dept: ADMINISTRATIVE | Facility: HOSPITAL | Age: 36
End: 2021-01-21

## 2021-02-24 DIAGNOSIS — E11.9 TYPE 2 DIABETES MELLITUS WITHOUT COMPLICATION: ICD-10-CM

## 2021-03-08 ENCOUNTER — PATIENT OUTREACH (OUTPATIENT)
Dept: ADMINISTRATIVE | Facility: HOSPITAL | Age: 36
End: 2021-03-08

## 2021-05-19 ENCOUNTER — PATIENT OUTREACH (OUTPATIENT)
Dept: ADMINISTRATIVE | Facility: HOSPITAL | Age: 36
End: 2021-05-19

## 2021-06-21 ENCOUNTER — PATIENT OUTREACH (OUTPATIENT)
Dept: ADMINISTRATIVE | Facility: HOSPITAL | Age: 36
End: 2021-06-21

## 2021-09-07 ENCOUNTER — TELEPHONE (OUTPATIENT)
Dept: INTERNAL MEDICINE | Facility: CLINIC | Age: 36
End: 2021-09-07

## 2021-09-09 ENCOUNTER — LAB VISIT (OUTPATIENT)
Dept: LAB | Facility: HOSPITAL | Age: 36
End: 2021-09-09
Attending: NURSE PRACTITIONER
Payer: COMMERCIAL

## 2021-09-09 ENCOUNTER — OFFICE VISIT (OUTPATIENT)
Dept: INTERNAL MEDICINE | Facility: CLINIC | Age: 36
End: 2021-09-09
Payer: COMMERCIAL

## 2021-09-09 VITALS
DIASTOLIC BLOOD PRESSURE: 74 MMHG | BODY MASS INDEX: 32.51 KG/M2 | SYSTOLIC BLOOD PRESSURE: 118 MMHG | HEIGHT: 65 IN | RESPIRATION RATE: 18 BRPM | OXYGEN SATURATION: 98 % | HEART RATE: 85 BPM | WEIGHT: 195.13 LBS | TEMPERATURE: 98 F

## 2021-09-09 DIAGNOSIS — E11.9 DIABETES MELLITUS TYPE 2 IN NONOBESE: ICD-10-CM

## 2021-09-09 DIAGNOSIS — Z00.00 ANNUAL PHYSICAL EXAM: Primary | ICD-10-CM

## 2021-09-09 DIAGNOSIS — E78.1 HYPERTRIGLYCERIDEMIA: ICD-10-CM

## 2021-09-09 DIAGNOSIS — Z00.00 ANNUAL PHYSICAL EXAM: ICD-10-CM

## 2021-09-09 LAB
ESTIMATED AVG GLUCOSE: 332 MG/DL (ref 68–131)
HBA1C MFR BLD: 13.2 % (ref 4–5.6)

## 2021-09-09 PROCEDURE — 84443 ASSAY THYROID STIM HORMONE: CPT | Performed by: NURSE PRACTITIONER

## 2021-09-09 PROCEDURE — 3074F SYST BP LT 130 MM HG: CPT | Mod: CPTII,S$GLB,, | Performed by: NURSE PRACTITIONER

## 2021-09-09 PROCEDURE — 1159F MED LIST DOCD IN RCRD: CPT | Mod: CPTII,S$GLB,, | Performed by: NURSE PRACTITIONER

## 2021-09-09 PROCEDURE — 36415 COLL VENOUS BLD VENIPUNCTURE: CPT | Mod: PO | Performed by: NURSE PRACTITIONER

## 2021-09-09 PROCEDURE — 3078F PR MOST RECENT DIASTOLIC BLOOD PRESSURE < 80 MM HG: ICD-10-PCS | Mod: CPTII,S$GLB,, | Performed by: NURSE PRACTITIONER

## 2021-09-09 PROCEDURE — 85025 COMPLETE CBC W/AUTO DIFF WBC: CPT | Performed by: NURSE PRACTITIONER

## 2021-09-09 PROCEDURE — 83036 HEMOGLOBIN GLYCOSYLATED A1C: CPT | Performed by: NURSE PRACTITIONER

## 2021-09-09 PROCEDURE — 3078F DIAST BP <80 MM HG: CPT | Mod: CPTII,S$GLB,, | Performed by: NURSE PRACTITIONER

## 2021-09-09 PROCEDURE — 82570 ASSAY OF URINE CREATININE: CPT | Performed by: NURSE PRACTITIONER

## 2021-09-09 PROCEDURE — 3074F PR MOST RECENT SYSTOLIC BLOOD PRESSURE < 130 MM HG: ICD-10-PCS | Mod: CPTII,S$GLB,, | Performed by: NURSE PRACTITIONER

## 2021-09-09 PROCEDURE — 1159F PR MEDICATION LIST DOCUMENTED IN MEDICAL RECORD: ICD-10-PCS | Mod: CPTII,S$GLB,, | Performed by: NURSE PRACTITIONER

## 2021-09-09 PROCEDURE — 3072F LOW RISK FOR RETINOPATHY: CPT | Mod: CPTII,S$GLB,, | Performed by: NURSE PRACTITIONER

## 2021-09-09 PROCEDURE — 80061 LIPID PANEL: CPT | Performed by: NURSE PRACTITIONER

## 2021-09-09 PROCEDURE — 3008F PR BODY MASS INDEX (BMI) DOCUMENTED: ICD-10-PCS | Mod: CPTII,S$GLB,, | Performed by: NURSE PRACTITIONER

## 2021-09-09 PROCEDURE — 1160F RVW MEDS BY RX/DR IN RCRD: CPT | Mod: CPTII,S$GLB,, | Performed by: NURSE PRACTITIONER

## 2021-09-09 PROCEDURE — 87389 HIV-1 AG W/HIV-1&-2 AB AG IA: CPT | Performed by: NURSE PRACTITIONER

## 2021-09-09 PROCEDURE — 1160F PR REVIEW ALL MEDS BY PRESCRIBER/CLIN PHARMACIST DOCUMENTED: ICD-10-PCS | Mod: CPTII,S$GLB,, | Performed by: NURSE PRACTITIONER

## 2021-09-09 PROCEDURE — 3072F PR LOW RISK FOR RETINOPATHY: ICD-10-PCS | Mod: CPTII,S$GLB,, | Performed by: NURSE PRACTITIONER

## 2021-09-09 PROCEDURE — 86803 HEPATITIS C AB TEST: CPT | Performed by: NURSE PRACTITIONER

## 2021-09-09 PROCEDURE — 80053 COMPREHEN METABOLIC PANEL: CPT | Performed by: NURSE PRACTITIONER

## 2021-09-09 PROCEDURE — 99999 PR PBB SHADOW E&M-EST. PATIENT-LVL IV: ICD-10-PCS | Mod: PBBFAC,,, | Performed by: NURSE PRACTITIONER

## 2021-09-09 PROCEDURE — 99214 PR OFFICE/OUTPT VISIT, EST, LEVL IV, 30-39 MIN: ICD-10-PCS | Mod: S$GLB,,, | Performed by: NURSE PRACTITIONER

## 2021-09-09 PROCEDURE — 99214 OFFICE O/P EST MOD 30 MIN: CPT | Mod: S$GLB,,, | Performed by: NURSE PRACTITIONER

## 2021-09-09 PROCEDURE — 99999 PR PBB SHADOW E&M-EST. PATIENT-LVL IV: CPT | Mod: PBBFAC,,, | Performed by: NURSE PRACTITIONER

## 2021-09-09 PROCEDURE — 3008F BODY MASS INDEX DOCD: CPT | Mod: CPTII,S$GLB,, | Performed by: NURSE PRACTITIONER

## 2021-09-09 RX ORDER — PEN NEEDLE, DIABETIC 32GX 5/32"
NEEDLE, DISPOSABLE MISCELLANEOUS
Qty: 100 EACH | Refills: 11 | Status: SHIPPED | OUTPATIENT
Start: 2021-09-09 | End: 2023-08-25

## 2021-09-10 ENCOUNTER — TELEPHONE (OUTPATIENT)
Dept: INTERNAL MEDICINE | Facility: CLINIC | Age: 36
End: 2021-09-10

## 2021-09-10 DIAGNOSIS — E78.1 HYPERTRIGLYCERIDEMIA: ICD-10-CM

## 2021-09-10 DIAGNOSIS — E11.65 UNCONTROLLED TYPE 2 DIABETES MELLITUS WITH HYPERGLYCEMIA: Primary | ICD-10-CM

## 2021-09-10 LAB
ALBUMIN SERPL BCP-MCNC: 3.3 G/DL (ref 3.5–5.2)
ALBUMIN/CREAT UR: NORMAL UG/MG (ref 0–30)
ALP SERPL-CCNC: 88 U/L (ref 55–135)
ALT SERPL W/O P-5'-P-CCNC: 18 U/L (ref 10–44)
ANION GAP SERPL CALC-SCNC: 19 MMOL/L (ref 8–16)
AST SERPL-CCNC: 12 U/L (ref 10–40)
BASOPHILS # BLD AUTO: 0.02 K/UL (ref 0–0.2)
BASOPHILS NFR BLD: 0.4 % (ref 0–1.9)
BILIRUB SERPL-MCNC: 0.3 MG/DL (ref 0.1–1)
BUN SERPL-MCNC: 4 MG/DL (ref 6–20)
CALCIUM SERPL-MCNC: 9.7 MG/DL (ref 8.7–10.5)
CHLORIDE SERPL-SCNC: 99 MMOL/L (ref 95–110)
CHOLEST SERPL-MCNC: 503 MG/DL (ref 120–199)
CHOLEST/HDLC SERPL: 21.9 {RATIO} (ref 2–5)
CO2 SERPL-SCNC: 12 MMOL/L (ref 23–29)
CREAT SERPL-MCNC: 0.5 MG/DL (ref 0.5–1.4)
CREAT UR-MCNC: 49 MG/DL (ref 15–325)
DIFFERENTIAL METHOD: ABNORMAL
EOSINOPHIL # BLD AUTO: 0.1 K/UL (ref 0–0.5)
EOSINOPHIL NFR BLD: 1.8 % (ref 0–8)
ERYTHROCYTE [DISTWIDTH] IN BLOOD BY AUTOMATED COUNT: 14 % (ref 11.5–14.5)
EST. GFR  (AFRICAN AMERICAN): >60 ML/MIN/1.73 M^2
EST. GFR  (NON AFRICAN AMERICAN): >60 ML/MIN/1.73 M^2
GLUCOSE SERPL-MCNC: 303 MG/DL (ref 70–110)
HCT VFR BLD AUTO: 39.7 % (ref 37–48.5)
HCV AB SERPL QL IA: NEGATIVE
HDLC SERPL-MCNC: 23 MG/DL (ref 40–75)
HDLC SERPL: 4.6 % (ref 20–50)
HGB BLD-MCNC: 12.3 G/DL (ref 12–16)
HIV 1+2 AB+HIV1 P24 AG SERPL QL IA: NEGATIVE
IMM GRANULOCYTES # BLD AUTO: 0.02 K/UL (ref 0–0.04)
IMM GRANULOCYTES NFR BLD AUTO: 0.4 % (ref 0–0.5)
LDLC SERPL CALC-MCNC: ABNORMAL MG/DL (ref 63–159)
LYMPHOCYTES # BLD AUTO: 1.8 K/UL (ref 1–4.8)
LYMPHOCYTES NFR BLD: 40.4 % (ref 18–48)
MCH RBC QN AUTO: 22.4 PG (ref 27–31)
MCHC RBC AUTO-ENTMCNC: 31 G/DL (ref 32–36)
MCV RBC AUTO: 72 FL (ref 82–98)
MICROALBUMIN UR DL<=1MG/L-MCNC: <5 UG/ML
MONOCYTES # BLD AUTO: 0.4 K/UL (ref 0.3–1)
MONOCYTES NFR BLD: 9.3 % (ref 4–15)
NEUTROPHILS # BLD AUTO: 2.2 K/UL (ref 1.8–7.7)
NEUTROPHILS NFR BLD: 47.7 % (ref 38–73)
NONHDLC SERPL-MCNC: 480 MG/DL
NRBC BLD-RTO: 0 /100 WBC
PLATELET # BLD AUTO: 367 K/UL (ref 150–450)
PMV BLD AUTO: 11.8 FL (ref 9.2–12.9)
POTASSIUM SERPL-SCNC: 4.1 MMOL/L (ref 3.5–5.1)
PROT SERPL-MCNC: 7.9 G/DL (ref 6–8.4)
RBC # BLD AUTO: 5.48 M/UL (ref 4–5.4)
SODIUM SERPL-SCNC: 130 MMOL/L (ref 136–145)
TRIGL SERPL-MCNC: 2795 MG/DL (ref 30–150)
TSH SERPL DL<=0.005 MIU/L-ACNC: 2.11 UIU/ML (ref 0.4–4)
WBC # BLD AUTO: 4.51 K/UL (ref 3.9–12.7)

## 2021-09-10 RX ORDER — FENOFIBRATE 43 MG/1
43 CAPSULE ORAL
Qty: 90 CAPSULE | Refills: 3 | Status: SHIPPED | OUTPATIENT
Start: 2021-09-10 | End: 2023-08-25

## 2021-09-13 ENCOUNTER — LAB VISIT (OUTPATIENT)
Dept: LAB | Facility: HOSPITAL | Age: 36
End: 2021-09-13
Attending: NURSE PRACTITIONER
Payer: COMMERCIAL

## 2021-09-13 DIAGNOSIS — E11.65 UNCONTROLLED TYPE 2 DIABETES MELLITUS WITH HYPERGLYCEMIA: ICD-10-CM

## 2021-09-13 LAB
ANION GAP SERPL CALC-SCNC: 14 MMOL/L (ref 8–16)
BUN SERPL-MCNC: 9 MG/DL (ref 6–20)
CALCIUM SERPL-MCNC: 10 MG/DL (ref 8.7–10.5)
CHLORIDE SERPL-SCNC: 100 MMOL/L (ref 95–110)
CO2 SERPL-SCNC: 18 MMOL/L (ref 23–29)
CREAT SERPL-MCNC: 0.8 MG/DL (ref 0.5–1.4)
EST. GFR  (AFRICAN AMERICAN): >60 ML/MIN/1.73 M^2
EST. GFR  (NON AFRICAN AMERICAN): >60 ML/MIN/1.73 M^2
GLUCOSE SERPL-MCNC: 303 MG/DL (ref 70–110)
POTASSIUM SERPL-SCNC: 3.7 MMOL/L (ref 3.5–5.1)
SODIUM SERPL-SCNC: 132 MMOL/L (ref 136–145)

## 2021-09-13 PROCEDURE — 36415 COLL VENOUS BLD VENIPUNCTURE: CPT | Mod: PO | Performed by: NURSE PRACTITIONER

## 2021-09-13 PROCEDURE — 80048 BASIC METABOLIC PNL TOTAL CA: CPT | Performed by: NURSE PRACTITIONER

## 2021-09-21 ENCOUNTER — TELEPHONE (OUTPATIENT)
Dept: DIABETES | Facility: CLINIC | Age: 36
End: 2021-09-21

## 2021-09-23 ENCOUNTER — OFFICE VISIT (OUTPATIENT)
Dept: INTERNAL MEDICINE | Facility: CLINIC | Age: 36
End: 2021-09-23
Payer: COMMERCIAL

## 2021-09-23 ENCOUNTER — LAB VISIT (OUTPATIENT)
Dept: LAB | Facility: HOSPITAL | Age: 36
End: 2021-09-23
Attending: NURSE PRACTITIONER
Payer: COMMERCIAL

## 2021-09-23 VITALS
HEART RATE: 84 BPM | SYSTOLIC BLOOD PRESSURE: 116 MMHG | OXYGEN SATURATION: 98 % | BODY MASS INDEX: 32.65 KG/M2 | RESPIRATION RATE: 16 BRPM | DIASTOLIC BLOOD PRESSURE: 78 MMHG | TEMPERATURE: 97 F | WEIGHT: 196.19 LBS

## 2021-09-23 DIAGNOSIS — E08.65 DIABETES MELLITUS DUE TO UNDERLYING CONDITION, UNCONTROLLED, WITH HYPERGLYCEMIA: ICD-10-CM

## 2021-09-23 DIAGNOSIS — E78.1 HYPERTRIGLYCERIDEMIA: ICD-10-CM

## 2021-09-23 DIAGNOSIS — E08.65 DIABETES MELLITUS DUE TO UNDERLYING CONDITION, UNCONTROLLED, WITH HYPERGLYCEMIA: Primary | ICD-10-CM

## 2021-09-23 LAB
ANION GAP SERPL CALC-SCNC: 8 MMOL/L (ref 8–16)
BUN SERPL-MCNC: 7 MG/DL (ref 6–20)
CALCIUM SERPL-MCNC: 9.2 MG/DL (ref 8.7–10.5)
CHLORIDE SERPL-SCNC: 108 MMOL/L (ref 95–110)
CO2 SERPL-SCNC: 19 MMOL/L (ref 23–29)
CREAT SERPL-MCNC: 0.7 MG/DL (ref 0.5–1.4)
EST. GFR  (AFRICAN AMERICAN): >60 ML/MIN/1.73 M^2
EST. GFR  (NON AFRICAN AMERICAN): >60 ML/MIN/1.73 M^2
GLUCOSE SERPL-MCNC: 194 MG/DL (ref 70–110)
GLUCOSE SERPL-MCNC: 221 MG/DL (ref 70–110)
POTASSIUM SERPL-SCNC: 3.9 MMOL/L (ref 3.5–5.1)
SODIUM SERPL-SCNC: 135 MMOL/L (ref 136–145)

## 2021-09-23 PROCEDURE — 3008F PR BODY MASS INDEX (BMI) DOCUMENTED: ICD-10-PCS | Mod: CPTII,S$GLB,, | Performed by: NURSE PRACTITIONER

## 2021-09-23 PROCEDURE — 36415 COLL VENOUS BLD VENIPUNCTURE: CPT | Mod: PO | Performed by: NURSE PRACTITIONER

## 2021-09-23 PROCEDURE — 82962 POCT GLUCOSE, HAND-HELD DEVICE: ICD-10-PCS | Mod: S$GLB,,, | Performed by: NURSE PRACTITIONER

## 2021-09-23 PROCEDURE — 3078F DIAST BP <80 MM HG: CPT | Mod: CPTII,S$GLB,, | Performed by: NURSE PRACTITIONER

## 2021-09-23 PROCEDURE — 80048 BASIC METABOLIC PNL TOTAL CA: CPT | Performed by: NURSE PRACTITIONER

## 2021-09-23 PROCEDURE — 3066F PR DOCUMENTATION OF TREATMENT FOR NEPHROPATHY: ICD-10-PCS | Mod: CPTII,S$GLB,, | Performed by: NURSE PRACTITIONER

## 2021-09-23 PROCEDURE — 99214 OFFICE O/P EST MOD 30 MIN: CPT | Mod: S$GLB,,, | Performed by: NURSE PRACTITIONER

## 2021-09-23 PROCEDURE — 3061F NEG MICROALBUMINURIA REV: CPT | Mod: CPTII,S$GLB,, | Performed by: NURSE PRACTITIONER

## 2021-09-23 PROCEDURE — 82962 GLUCOSE BLOOD TEST: CPT | Mod: S$GLB,,, | Performed by: NURSE PRACTITIONER

## 2021-09-23 PROCEDURE — 99999 PR PBB SHADOW E&M-EST. PATIENT-LVL III: ICD-10-PCS | Mod: PBBFAC,,, | Performed by: NURSE PRACTITIONER

## 2021-09-23 PROCEDURE — 3061F PR NEG MICROALBUMINURIA RESULT DOCUMENTED/REVIEW: ICD-10-PCS | Mod: CPTII,S$GLB,, | Performed by: NURSE PRACTITIONER

## 2021-09-23 PROCEDURE — 99999 PR PBB SHADOW E&M-EST. PATIENT-LVL III: CPT | Mod: PBBFAC,,, | Performed by: NURSE PRACTITIONER

## 2021-09-23 PROCEDURE — 1159F MED LIST DOCD IN RCRD: CPT | Mod: CPTII,S$GLB,, | Performed by: NURSE PRACTITIONER

## 2021-09-23 PROCEDURE — 99214 PR OFFICE/OUTPT VISIT, EST, LEVL IV, 30-39 MIN: ICD-10-PCS | Mod: S$GLB,,, | Performed by: NURSE PRACTITIONER

## 2021-09-23 PROCEDURE — 1159F PR MEDICATION LIST DOCUMENTED IN MEDICAL RECORD: ICD-10-PCS | Mod: CPTII,S$GLB,, | Performed by: NURSE PRACTITIONER

## 2021-09-23 PROCEDURE — 3074F SYST BP LT 130 MM HG: CPT | Mod: CPTII,S$GLB,, | Performed by: NURSE PRACTITIONER

## 2021-09-23 PROCEDURE — 3046F HEMOGLOBIN A1C LEVEL >9.0%: CPT | Mod: CPTII,S$GLB,, | Performed by: NURSE PRACTITIONER

## 2021-09-23 PROCEDURE — 3074F PR MOST RECENT SYSTOLIC BLOOD PRESSURE < 130 MM HG: ICD-10-PCS | Mod: CPTII,S$GLB,, | Performed by: NURSE PRACTITIONER

## 2021-09-23 PROCEDURE — 3066F NEPHROPATHY DOC TX: CPT | Mod: CPTII,S$GLB,, | Performed by: NURSE PRACTITIONER

## 2021-09-23 PROCEDURE — 1160F RVW MEDS BY RX/DR IN RCRD: CPT | Mod: CPTII,S$GLB,, | Performed by: NURSE PRACTITIONER

## 2021-09-23 PROCEDURE — 1160F PR REVIEW ALL MEDS BY PRESCRIBER/CLIN PHARMACIST DOCUMENTED: ICD-10-PCS | Mod: CPTII,S$GLB,, | Performed by: NURSE PRACTITIONER

## 2021-09-23 PROCEDURE — 3072F LOW RISK FOR RETINOPATHY: CPT | Mod: CPTII,S$GLB,, | Performed by: NURSE PRACTITIONER

## 2021-09-23 PROCEDURE — 3078F PR MOST RECENT DIASTOLIC BLOOD PRESSURE < 80 MM HG: ICD-10-PCS | Mod: CPTII,S$GLB,, | Performed by: NURSE PRACTITIONER

## 2021-09-23 PROCEDURE — 3008F BODY MASS INDEX DOCD: CPT | Mod: CPTII,S$GLB,, | Performed by: NURSE PRACTITIONER

## 2021-09-23 PROCEDURE — 3072F PR LOW RISK FOR RETINOPATHY: ICD-10-PCS | Mod: CPTII,S$GLB,, | Performed by: NURSE PRACTITIONER

## 2021-09-23 PROCEDURE — 3046F PR MOST RECENT HEMOGLOBIN A1C LEVEL > 9.0%: ICD-10-PCS | Mod: CPTII,S$GLB,, | Performed by: NURSE PRACTITIONER

## 2021-09-23 RX ORDER — ATORVASTATIN CALCIUM 20 MG/1
20 TABLET, FILM COATED ORAL DAILY
Qty: 90 TABLET | Refills: 3 | Status: SHIPPED | OUTPATIENT
Start: 2021-09-23 | End: 2023-08-25

## 2021-10-19 ENCOUNTER — TELEPHONE (OUTPATIENT)
Dept: OBSTETRICS AND GYNECOLOGY | Facility: CLINIC | Age: 36
End: 2021-10-19

## 2021-10-19 ENCOUNTER — TELEPHONE (OUTPATIENT)
Dept: DIABETES | Facility: CLINIC | Age: 36
End: 2021-10-19

## 2021-10-20 ENCOUNTER — PATIENT OUTREACH (OUTPATIENT)
Dept: ADMINISTRATIVE | Facility: HOSPITAL | Age: 36
End: 2021-10-20

## 2021-10-20 ENCOUNTER — PATIENT OUTREACH (OUTPATIENT)
Dept: ADMINISTRATIVE | Facility: OTHER | Age: 36
End: 2021-10-20

## 2021-10-20 DIAGNOSIS — E11.9 DIABETES MELLITUS TYPE 2 IN NONOBESE: Primary | ICD-10-CM

## 2021-11-11 ENCOUNTER — OFFICE VISIT (OUTPATIENT)
Dept: OBSTETRICS AND GYNECOLOGY | Facility: CLINIC | Age: 36
End: 2021-11-11
Payer: COMMERCIAL

## 2021-11-11 VITALS
HEIGHT: 65 IN | WEIGHT: 196.56 LBS | SYSTOLIC BLOOD PRESSURE: 116 MMHG | DIASTOLIC BLOOD PRESSURE: 87 MMHG | BODY MASS INDEX: 32.75 KG/M2

## 2021-11-11 DIAGNOSIS — Z01.419 ENCOUNTER FOR GYNECOLOGICAL EXAMINATION WITHOUT ABNORMAL FINDING: Primary | ICD-10-CM

## 2021-11-11 DIAGNOSIS — Z11.3 ENCOUNTER FOR SCREENING FOR INFECTIONS WITH PREDOMINANTLY SEXUAL MODE OF TRANSMISSION: ICD-10-CM

## 2021-11-11 DIAGNOSIS — Z30.014 ENCOUNTER FOR INITIAL PRESCRIPTION OF INTRAUTERINE CONTRACEPTIVE DEVICE: ICD-10-CM

## 2021-11-11 PROCEDURE — 99999 PR PBB SHADOW E&M-EST. PATIENT-LVL III: ICD-10-PCS | Mod: PBBFAC,,, | Performed by: NURSE PRACTITIONER

## 2021-11-11 PROCEDURE — 1160F RVW MEDS BY RX/DR IN RCRD: CPT | Mod: CPTII,S$GLB,, | Performed by: NURSE PRACTITIONER

## 2021-11-11 PROCEDURE — 3066F PR DOCUMENTATION OF TREATMENT FOR NEPHROPATHY: ICD-10-PCS | Mod: CPTII,S$GLB,, | Performed by: NURSE PRACTITIONER

## 2021-11-11 PROCEDURE — 1159F PR MEDICATION LIST DOCUMENTED IN MEDICAL RECORD: ICD-10-PCS | Mod: CPTII,S$GLB,, | Performed by: NURSE PRACTITIONER

## 2021-11-11 PROCEDURE — 3046F PR MOST RECENT HEMOGLOBIN A1C LEVEL > 9.0%: ICD-10-PCS | Mod: CPTII,S$GLB,, | Performed by: NURSE PRACTITIONER

## 2021-11-11 PROCEDURE — 3072F LOW RISK FOR RETINOPATHY: CPT | Mod: CPTII,S$GLB,, | Performed by: NURSE PRACTITIONER

## 2021-11-11 PROCEDURE — 3074F SYST BP LT 130 MM HG: CPT | Mod: CPTII,S$GLB,, | Performed by: NURSE PRACTITIONER

## 2021-11-11 PROCEDURE — 3072F PR LOW RISK FOR RETINOPATHY: ICD-10-PCS | Mod: CPTII,S$GLB,, | Performed by: NURSE PRACTITIONER

## 2021-11-11 PROCEDURE — 99395 PR PREVENTIVE VISIT,EST,18-39: ICD-10-PCS | Mod: S$GLB,,, | Performed by: NURSE PRACTITIONER

## 2021-11-11 PROCEDURE — 3074F PR MOST RECENT SYSTOLIC BLOOD PRESSURE < 130 MM HG: ICD-10-PCS | Mod: CPTII,S$GLB,, | Performed by: NURSE PRACTITIONER

## 2021-11-11 PROCEDURE — 3066F NEPHROPATHY DOC TX: CPT | Mod: CPTII,S$GLB,, | Performed by: NURSE PRACTITIONER

## 2021-11-11 PROCEDURE — 87591 N.GONORRHOEAE DNA AMP PROB: CPT | Performed by: NURSE PRACTITIONER

## 2021-11-11 PROCEDURE — 3061F NEG MICROALBUMINURIA REV: CPT | Mod: CPTII,S$GLB,, | Performed by: NURSE PRACTITIONER

## 2021-11-11 PROCEDURE — 87491 CHLMYD TRACH DNA AMP PROBE: CPT | Performed by: NURSE PRACTITIONER

## 2021-11-11 PROCEDURE — 3079F DIAST BP 80-89 MM HG: CPT | Mod: CPTII,S$GLB,, | Performed by: NURSE PRACTITIONER

## 2021-11-11 PROCEDURE — 99999 PR PBB SHADOW E&M-EST. PATIENT-LVL III: CPT | Mod: PBBFAC,,, | Performed by: NURSE PRACTITIONER

## 2021-11-11 PROCEDURE — 1160F PR REVIEW ALL MEDS BY PRESCRIBER/CLIN PHARMACIST DOCUMENTED: ICD-10-PCS | Mod: CPTII,S$GLB,, | Performed by: NURSE PRACTITIONER

## 2021-11-11 PROCEDURE — 3061F PR NEG MICROALBUMINURIA RESULT DOCUMENTED/REVIEW: ICD-10-PCS | Mod: CPTII,S$GLB,, | Performed by: NURSE PRACTITIONER

## 2021-11-11 PROCEDURE — 3008F BODY MASS INDEX DOCD: CPT | Mod: CPTII,S$GLB,, | Performed by: NURSE PRACTITIONER

## 2021-11-11 PROCEDURE — 3046F HEMOGLOBIN A1C LEVEL >9.0%: CPT | Mod: CPTII,S$GLB,, | Performed by: NURSE PRACTITIONER

## 2021-11-11 PROCEDURE — 99395 PREV VISIT EST AGE 18-39: CPT | Mod: S$GLB,,, | Performed by: NURSE PRACTITIONER

## 2021-11-11 PROCEDURE — 1159F MED LIST DOCD IN RCRD: CPT | Mod: CPTII,S$GLB,, | Performed by: NURSE PRACTITIONER

## 2021-11-11 PROCEDURE — 3008F PR BODY MASS INDEX (BMI) DOCUMENTED: ICD-10-PCS | Mod: CPTII,S$GLB,, | Performed by: NURSE PRACTITIONER

## 2021-11-11 PROCEDURE — 3079F PR MOST RECENT DIASTOLIC BLOOD PRESSURE 80-89 MM HG: ICD-10-PCS | Mod: CPTII,S$GLB,, | Performed by: NURSE PRACTITIONER

## 2021-11-16 LAB
C TRACH DNA SPEC QL NAA+PROBE: NOT DETECTED
N GONORRHOEA DNA SPEC QL NAA+PROBE: NOT DETECTED

## 2021-11-23 ENCOUNTER — PROCEDURE VISIT (OUTPATIENT)
Dept: OBSTETRICS AND GYNECOLOGY | Facility: CLINIC | Age: 36
End: 2021-11-23
Payer: COMMERCIAL

## 2021-11-23 VITALS — SYSTOLIC BLOOD PRESSURE: 128 MMHG | WEIGHT: 207.88 LBS | DIASTOLIC BLOOD PRESSURE: 78 MMHG | BODY MASS INDEX: 34.6 KG/M2

## 2021-11-23 DIAGNOSIS — Z30.430 ENCOUNTER FOR INSERTION OF MIRENA IUD: Primary | ICD-10-CM

## 2021-11-23 PROCEDURE — 58300 INSERTION OF IUD: ICD-10-PCS | Mod: S$GLB,,, | Performed by: NURSE PRACTITIONER

## 2021-11-23 PROCEDURE — 81025 URINE PREGNANCY TEST: CPT | Mod: S$GLB,,, | Performed by: NURSE PRACTITIONER

## 2021-11-23 PROCEDURE — 58300 INSERT INTRAUTERINE DEVICE: CPT | Mod: S$GLB,,, | Performed by: NURSE PRACTITIONER

## 2021-11-23 PROCEDURE — 81025 PR  URINE PREGNANCY TEST: ICD-10-PCS | Mod: S$GLB,,, | Performed by: NURSE PRACTITIONER

## 2021-12-02 ENCOUNTER — OFFICE VISIT (OUTPATIENT)
Dept: INTERNAL MEDICINE | Facility: CLINIC | Age: 36
End: 2021-12-02
Payer: COMMERCIAL

## 2021-12-02 VITALS
HEART RATE: 90 BPM | WEIGHT: 196.13 LBS | TEMPERATURE: 98 F | BODY MASS INDEX: 32.68 KG/M2 | RESPIRATION RATE: 16 BRPM | DIASTOLIC BLOOD PRESSURE: 82 MMHG | SYSTOLIC BLOOD PRESSURE: 104 MMHG | OXYGEN SATURATION: 99 % | HEIGHT: 65 IN

## 2021-12-02 DIAGNOSIS — E08.65 DIABETES MELLITUS DUE TO UNDERLYING CONDITION, UNCONTROLLED, WITH HYPERGLYCEMIA: ICD-10-CM

## 2021-12-02 LAB — GLUCOSE SERPL-MCNC: 166 MG/DL (ref 70–110)

## 2021-12-02 PROCEDURE — 3061F PR NEG MICROALBUMINURIA RESULT DOCUMENTED/REVIEW: ICD-10-PCS | Mod: CPTII,S$GLB,, | Performed by: NURSE PRACTITIONER

## 2021-12-02 PROCEDURE — 99214 OFFICE O/P EST MOD 30 MIN: CPT | Mod: S$GLB,,, | Performed by: NURSE PRACTITIONER

## 2021-12-02 PROCEDURE — 3061F NEG MICROALBUMINURIA REV: CPT | Mod: CPTII,S$GLB,, | Performed by: NURSE PRACTITIONER

## 2021-12-02 PROCEDURE — 82962 POCT GLUCOSE, HAND-HELD DEVICE: ICD-10-PCS | Mod: S$GLB,,, | Performed by: NURSE PRACTITIONER

## 2021-12-02 PROCEDURE — 3072F PR LOW RISK FOR RETINOPATHY: ICD-10-PCS | Mod: CPTII,S$GLB,, | Performed by: NURSE PRACTITIONER

## 2021-12-02 PROCEDURE — 3066F NEPHROPATHY DOC TX: CPT | Mod: CPTII,S$GLB,, | Performed by: NURSE PRACTITIONER

## 2021-12-02 PROCEDURE — 3072F LOW RISK FOR RETINOPATHY: CPT | Mod: CPTII,S$GLB,, | Performed by: NURSE PRACTITIONER

## 2021-12-02 PROCEDURE — 99999 PR PBB SHADOW E&M-EST. PATIENT-LVL IV: CPT | Mod: PBBFAC,,, | Performed by: NURSE PRACTITIONER

## 2021-12-02 PROCEDURE — 82962 GLUCOSE BLOOD TEST: CPT | Mod: S$GLB,,, | Performed by: NURSE PRACTITIONER

## 2021-12-02 PROCEDURE — 3066F PR DOCUMENTATION OF TREATMENT FOR NEPHROPATHY: ICD-10-PCS | Mod: CPTII,S$GLB,, | Performed by: NURSE PRACTITIONER

## 2021-12-02 PROCEDURE — 99999 PR PBB SHADOW E&M-EST. PATIENT-LVL IV: ICD-10-PCS | Mod: PBBFAC,,, | Performed by: NURSE PRACTITIONER

## 2021-12-02 PROCEDURE — 99214 PR OFFICE/OUTPT VISIT, EST, LEVL IV, 30-39 MIN: ICD-10-PCS | Mod: S$GLB,,, | Performed by: NURSE PRACTITIONER

## 2021-12-22 ENCOUNTER — PATIENT MESSAGE (OUTPATIENT)
Dept: OBSTETRICS AND GYNECOLOGY | Facility: CLINIC | Age: 36
End: 2021-12-22
Payer: COMMERCIAL

## 2021-12-22 ENCOUNTER — PATIENT OUTREACH (OUTPATIENT)
Dept: ADMINISTRATIVE | Facility: OTHER | Age: 36
End: 2021-12-22
Payer: COMMERCIAL

## 2022-02-17 ENCOUNTER — PATIENT OUTREACH (OUTPATIENT)
Dept: ADMINISTRATIVE | Facility: HOSPITAL | Age: 37
End: 2022-02-17
Payer: COMMERCIAL

## 2022-02-17 NOTE — PROGRESS NOTES
DM Report: Patient has an appointment with PCP on 3/23/22 for follow up, lab appointment scheduled same day for recheck of Hemoglobin A1c.

## 2022-03-24 ENCOUNTER — PATIENT MESSAGE (OUTPATIENT)
Dept: ADMINISTRATIVE | Facility: HOSPITAL | Age: 37
End: 2022-03-24
Payer: COMMERCIAL

## 2022-04-19 ENCOUNTER — PATIENT OUTREACH (OUTPATIENT)
Dept: ADMINISTRATIVE | Facility: HOSPITAL | Age: 37
End: 2022-04-19
Payer: COMMERCIAL

## 2022-04-19 DIAGNOSIS — E11.9 DIABETES MELLITUS TYPE 2 IN NONOBESE: Primary | ICD-10-CM

## 2022-04-19 NOTE — PROGRESS NOTES
DM Report: Patient notified to schedule overdue Hemoglobin A1c. Patient has scheduled lab appointment on 4/27/22.

## 2022-04-26 ENCOUNTER — PATIENT MESSAGE (OUTPATIENT)
Dept: ADMINISTRATIVE | Facility: HOSPITAL | Age: 37
End: 2022-04-26
Payer: COMMERCIAL

## 2022-06-23 ENCOUNTER — PATIENT MESSAGE (OUTPATIENT)
Dept: ADMINISTRATIVE | Facility: HOSPITAL | Age: 37
End: 2022-06-23
Payer: COMMERCIAL

## 2022-07-01 ENCOUNTER — PATIENT OUTREACH (OUTPATIENT)
Dept: ADMINISTRATIVE | Facility: HOSPITAL | Age: 37
End: 2022-07-01
Payer: COMMERCIAL

## 2022-07-01 ENCOUNTER — PATIENT MESSAGE (OUTPATIENT)
Dept: ADMINISTRATIVE | Facility: HOSPITAL | Age: 37
End: 2022-07-01
Payer: COMMERCIAL

## 2022-07-01 NOTE — PROGRESS NOTES
HA1c Report: Called Pt to schedule HA1c, Pt says she will call back she just started new job & moved to [place] and would like to set up with [place] Provider. Delayed msg sent to patient as a f/u.

## 2022-12-14 ENCOUNTER — PATIENT OUTREACH (OUTPATIENT)
Dept: ADMINISTRATIVE | Facility: HOSPITAL | Age: 37
End: 2022-12-14
Payer: COMMERCIAL

## 2022-12-14 NOTE — PROGRESS NOTES
Working Overdue Pap Report.    Per phone note in July pt has moved to [place] [facility].    LM offering to schedule annual exam and pap.

## 2023-01-25 ENCOUNTER — PATIENT MESSAGE (OUTPATIENT)
Dept: ADMINISTRATIVE | Facility: HOSPITAL | Age: 38
End: 2023-01-25
Payer: COMMERCIAL

## 2023-04-19 ENCOUNTER — PATIENT MESSAGE (OUTPATIENT)
Dept: ADMINISTRATIVE | Facility: HOSPITAL | Age: 38
End: 2023-04-19
Payer: COMMERCIAL

## 2023-08-02 ENCOUNTER — PATIENT OUTREACH (OUTPATIENT)
Dept: ADMINISTRATIVE | Facility: HOSPITAL | Age: 38
End: 2023-08-02
Payer: COMMERCIAL

## 2023-08-02 NOTE — PROGRESS NOTES
Continuity Report-Overdue Annual Exam.     Pt last seen Dec 2021.  [facility]-no recent results found.  [facility]-no results found.  Care Everywhere- no recent results found.     Spoke with pt advising due for annual exam, follow up diabetes with labs. Said she has moved to [place] and needs a new pcp.  Assisted to schedule annual exam/est care appt, 8/25/23. Pt will come in fasting for same day labs.    Appt slip mailed to home address.

## 2023-08-25 ENCOUNTER — OFFICE VISIT (OUTPATIENT)
Dept: FAMILY MEDICINE | Facility: CLINIC | Age: 38
End: 2023-08-25
Payer: COMMERCIAL

## 2023-08-25 ENCOUNTER — LAB VISIT (OUTPATIENT)
Dept: LAB | Facility: HOSPITAL | Age: 38
End: 2023-08-25
Attending: STUDENT IN AN ORGANIZED HEALTH CARE EDUCATION/TRAINING PROGRAM
Payer: COMMERCIAL

## 2023-08-25 VITALS
OXYGEN SATURATION: 99 % | DIASTOLIC BLOOD PRESSURE: 83 MMHG | BODY MASS INDEX: 30.51 KG/M2 | RESPIRATION RATE: 18 BRPM | WEIGHT: 183.13 LBS | HEIGHT: 65 IN | SYSTOLIC BLOOD PRESSURE: 117 MMHG | HEART RATE: 90 BPM

## 2023-08-25 DIAGNOSIS — E11.65 UNCONTROLLED TYPE 2 DIABETES MELLITUS WITH HYPERGLYCEMIA: ICD-10-CM

## 2023-08-25 DIAGNOSIS — E78.5 HYPERLIPIDEMIA ASSOCIATED WITH TYPE 2 DIABETES MELLITUS: ICD-10-CM

## 2023-08-25 DIAGNOSIS — E78.1 HYPERTRIGLYCERIDEMIA: ICD-10-CM

## 2023-08-25 DIAGNOSIS — E11.69 HYPERLIPIDEMIA ASSOCIATED WITH TYPE 2 DIABETES MELLITUS: ICD-10-CM

## 2023-08-25 DIAGNOSIS — F32.A DEPRESSION, UNSPECIFIED DEPRESSION TYPE: ICD-10-CM

## 2023-08-25 DIAGNOSIS — E04.9 ENLARGED THYROID: ICD-10-CM

## 2023-08-25 DIAGNOSIS — E66.9 OBESITY (BMI 30-39.9): ICD-10-CM

## 2023-08-25 DIAGNOSIS — E11.65 TYPE 2 DIABETES MELLITUS WITH HYPERGLYCEMIA, WITHOUT LONG-TERM CURRENT USE OF INSULIN: ICD-10-CM

## 2023-08-25 DIAGNOSIS — E08.65 DIABETES MELLITUS DUE TO UNDERLYING CONDITION, UNCONTROLLED, WITH HYPERGLYCEMIA: ICD-10-CM

## 2023-08-25 DIAGNOSIS — Z12.4 PAP SMEAR FOR CERVICAL CANCER SCREENING: ICD-10-CM

## 2023-08-25 DIAGNOSIS — E08.65 DIABETES MELLITUS DUE TO UNDERLYING CONDITION, UNCONTROLLED, WITH HYPERGLYCEMIA: Primary | ICD-10-CM

## 2023-08-25 LAB
ALBUMIN/CREAT UR: NORMAL UG/MG (ref 0–30)
BASOPHILS # BLD AUTO: 0.04 K/UL (ref 0–0.2)
BASOPHILS NFR BLD: 0.7 % (ref 0–1.9)
CHOLEST SERPL-MCNC: 261 MG/DL (ref 120–199)
CHOLEST/HDLC SERPL: 6.5 {RATIO} (ref 2–5)
CREAT UR-MCNC: 53 MG/DL (ref 15–325)
DIFFERENTIAL METHOD: ABNORMAL
EOSINOPHIL # BLD AUTO: 0.1 K/UL (ref 0–0.5)
EOSINOPHIL NFR BLD: 1.1 % (ref 0–8)
ERYTHROCYTE [DISTWIDTH] IN BLOOD BY AUTOMATED COUNT: 12.2 % (ref 11.5–14.5)
ESTIMATED AVG GLUCOSE: ABNORMAL MG/DL (ref 68–131)
HBA1C MFR BLD: >14 % (ref 4–5.6)
HCT VFR BLD AUTO: 43.1 % (ref 37–48.5)
HDLC SERPL-MCNC: 40 MG/DL (ref 40–75)
HDLC SERPL: 15.3 % (ref 20–50)
HGB BLD-MCNC: 13 G/DL (ref 12–16)
IMM GRANULOCYTES # BLD AUTO: 0.03 K/UL (ref 0–0.04)
IMM GRANULOCYTES NFR BLD AUTO: 0.5 % (ref 0–0.5)
LDLC SERPL CALC-MCNC: ABNORMAL MG/DL (ref 63–159)
LYMPHOCYTES # BLD AUTO: 2.4 K/UL (ref 1–4.8)
LYMPHOCYTES NFR BLD: 41.3 % (ref 18–48)
MCH RBC QN AUTO: 22.4 PG (ref 27–31)
MCHC RBC AUTO-ENTMCNC: 30.2 G/DL (ref 32–36)
MCV RBC AUTO: 74 FL (ref 82–98)
MICROALBUMIN UR DL<=1MG/L-MCNC: <5 UG/ML
MONOCYTES # BLD AUTO: 0.5 K/UL (ref 0.3–1)
MONOCYTES NFR BLD: 7.9 % (ref 4–15)
NEUTROPHILS # BLD AUTO: 2.8 K/UL (ref 1.8–7.7)
NEUTROPHILS NFR BLD: 48.5 % (ref 38–73)
NONHDLC SERPL-MCNC: 221 MG/DL
NRBC BLD-RTO: 0 /100 WBC
PLATELET # BLD AUTO: 430 K/UL (ref 150–450)
PMV BLD AUTO: 10.9 FL (ref 9.2–12.9)
RBC # BLD AUTO: 5.81 M/UL (ref 4–5.4)
TRIGL SERPL-MCNC: 539 MG/DL (ref 30–150)
TSH SERPL DL<=0.005 MIU/L-ACNC: 2.07 UIU/ML (ref 0.4–4)
WBC # BLD AUTO: 5.69 K/UL (ref 3.9–12.7)

## 2023-08-25 PROCEDURE — 3008F PR BODY MASS INDEX (BMI) DOCUMENTED: ICD-10-PCS | Mod: CPTII,S$GLB,, | Performed by: STUDENT IN AN ORGANIZED HEALTH CARE EDUCATION/TRAINING PROGRAM

## 2023-08-25 PROCEDURE — 99204 OFFICE O/P NEW MOD 45 MIN: CPT | Mod: S$GLB,,, | Performed by: STUDENT IN AN ORGANIZED HEALTH CARE EDUCATION/TRAINING PROGRAM

## 2023-08-25 PROCEDURE — 3079F DIAST BP 80-89 MM HG: CPT | Mod: CPTII,S$GLB,, | Performed by: STUDENT IN AN ORGANIZED HEALTH CARE EDUCATION/TRAINING PROGRAM

## 2023-08-25 PROCEDURE — 1159F MED LIST DOCD IN RCRD: CPT | Mod: CPTII,S$GLB,, | Performed by: STUDENT IN AN ORGANIZED HEALTH CARE EDUCATION/TRAINING PROGRAM

## 2023-08-25 PROCEDURE — 3074F PR MOST RECENT SYSTOLIC BLOOD PRESSURE < 130 MM HG: ICD-10-PCS | Mod: CPTII,S$GLB,, | Performed by: STUDENT IN AN ORGANIZED HEALTH CARE EDUCATION/TRAINING PROGRAM

## 2023-08-25 PROCEDURE — 85025 COMPLETE CBC W/AUTO DIFF WBC: CPT | Performed by: STUDENT IN AN ORGANIZED HEALTH CARE EDUCATION/TRAINING PROGRAM

## 2023-08-25 PROCEDURE — 84443 ASSAY THYROID STIM HORMONE: CPT | Performed by: STUDENT IN AN ORGANIZED HEALTH CARE EDUCATION/TRAINING PROGRAM

## 2023-08-25 PROCEDURE — 3074F SYST BP LT 130 MM HG: CPT | Mod: CPTII,S$GLB,, | Performed by: STUDENT IN AN ORGANIZED HEALTH CARE EDUCATION/TRAINING PROGRAM

## 2023-08-25 PROCEDURE — 3079F PR MOST RECENT DIASTOLIC BLOOD PRESSURE 80-89 MM HG: ICD-10-PCS | Mod: CPTII,S$GLB,, | Performed by: STUDENT IN AN ORGANIZED HEALTH CARE EDUCATION/TRAINING PROGRAM

## 2023-08-25 PROCEDURE — 82570 ASSAY OF URINE CREATININE: CPT | Performed by: STUDENT IN AN ORGANIZED HEALTH CARE EDUCATION/TRAINING PROGRAM

## 2023-08-25 PROCEDURE — 1160F RVW MEDS BY RX/DR IN RCRD: CPT | Mod: CPTII,S$GLB,, | Performed by: STUDENT IN AN ORGANIZED HEALTH CARE EDUCATION/TRAINING PROGRAM

## 2023-08-25 PROCEDURE — 1159F PR MEDICATION LIST DOCUMENTED IN MEDICAL RECORD: ICD-10-PCS | Mod: CPTII,S$GLB,, | Performed by: STUDENT IN AN ORGANIZED HEALTH CARE EDUCATION/TRAINING PROGRAM

## 2023-08-25 PROCEDURE — 80061 LIPID PANEL: CPT | Performed by: STUDENT IN AN ORGANIZED HEALTH CARE EDUCATION/TRAINING PROGRAM

## 2023-08-25 PROCEDURE — 4010F PR ACE/ARB THEARPY RXD/TAKEN: ICD-10-PCS | Mod: CPTII,S$GLB,, | Performed by: STUDENT IN AN ORGANIZED HEALTH CARE EDUCATION/TRAINING PROGRAM

## 2023-08-25 PROCEDURE — 3008F BODY MASS INDEX DOCD: CPT | Mod: CPTII,S$GLB,, | Performed by: STUDENT IN AN ORGANIZED HEALTH CARE EDUCATION/TRAINING PROGRAM

## 2023-08-25 PROCEDURE — 99999 PR PBB SHADOW E&M-EST. PATIENT-LVL V: ICD-10-PCS | Mod: PBBFAC,,, | Performed by: STUDENT IN AN ORGANIZED HEALTH CARE EDUCATION/TRAINING PROGRAM

## 2023-08-25 PROCEDURE — 99204 PR OFFICE/OUTPT VISIT, NEW, LEVL IV, 45-59 MIN: ICD-10-PCS | Mod: S$GLB,,, | Performed by: STUDENT IN AN ORGANIZED HEALTH CARE EDUCATION/TRAINING PROGRAM

## 2023-08-25 PROCEDURE — 4010F ACE/ARB THERAPY RXD/TAKEN: CPT | Mod: CPTII,S$GLB,, | Performed by: STUDENT IN AN ORGANIZED HEALTH CARE EDUCATION/TRAINING PROGRAM

## 2023-08-25 PROCEDURE — 36415 COLL VENOUS BLD VENIPUNCTURE: CPT | Mod: PO | Performed by: STUDENT IN AN ORGANIZED HEALTH CARE EDUCATION/TRAINING PROGRAM

## 2023-08-25 PROCEDURE — 99999 PR PBB SHADOW E&M-EST. PATIENT-LVL V: CPT | Mod: PBBFAC,,, | Performed by: STUDENT IN AN ORGANIZED HEALTH CARE EDUCATION/TRAINING PROGRAM

## 2023-08-25 PROCEDURE — 1160F PR REVIEW ALL MEDS BY PRESCRIBER/CLIN PHARMACIST DOCUMENTED: ICD-10-PCS | Mod: CPTII,S$GLB,, | Performed by: STUDENT IN AN ORGANIZED HEALTH CARE EDUCATION/TRAINING PROGRAM

## 2023-08-25 PROCEDURE — 83036 HEMOGLOBIN GLYCOSYLATED A1C: CPT | Performed by: STUDENT IN AN ORGANIZED HEALTH CARE EDUCATION/TRAINING PROGRAM

## 2023-08-25 RX ORDER — ESCITALOPRAM OXALATE 10 MG/1
10 TABLET ORAL DAILY
Qty: 90 TABLET | Refills: 3 | Status: ON HOLD | OUTPATIENT
Start: 2023-08-25 | End: 2024-08-24

## 2023-08-25 RX ORDER — INSULIN PUMP SYRINGE, 3 ML
EACH MISCELLANEOUS
Qty: 100 EACH | Refills: 1 | Status: ON HOLD | OUTPATIENT
Start: 2023-08-25 | End: 2024-08-24

## 2023-08-25 RX ORDER — METFORMIN HYDROCHLORIDE 500 MG/1
500 TABLET, EXTENDED RELEASE ORAL NIGHTLY
Qty: 90 TABLET | Refills: 3 | Status: ON HOLD | OUTPATIENT
Start: 2023-08-25 | End: 2024-08-24

## 2023-08-25 RX ORDER — ATORVASTATIN CALCIUM 20 MG/1
20 TABLET, FILM COATED ORAL DAILY
Qty: 90 TABLET | Refills: 3 | Status: SHIPPED | OUTPATIENT
Start: 2023-08-25 | End: 2024-03-21 | Stop reason: SDUPTHER

## 2023-08-25 RX ORDER — INSULIN GLARGINE 100 [IU]/ML
10 INJECTION, SOLUTION SUBCUTANEOUS NIGHTLY
Qty: 15 ML | Refills: 11 | Status: ON HOLD | OUTPATIENT
Start: 2023-08-25

## 2023-08-25 RX ORDER — LANCETS
EACH MISCELLANEOUS
Qty: 100 EACH | Refills: 1 | Status: ON HOLD | OUTPATIENT
Start: 2023-08-25

## 2023-08-25 RX ORDER — LOSARTAN POTASSIUM 25 MG/1
25 TABLET ORAL DAILY
Qty: 90 TABLET | Refills: 3 | Status: ON HOLD | OUTPATIENT
Start: 2023-08-25 | End: 2024-08-24

## 2023-08-25 NOTE — PROGRESS NOTES
Problem List Items Addressed This Visit          Psychiatric    Depression    Overview     Multiple life stressors  - start lexapro  8w f/u          Relevant Medications    EScitalopram oxalate (LEXAPRO) 10 MG tablet       Cardiac/Vascular    Hyperlipidemia associated with type 2 diabetes mellitus    Overview     -chronic condition. Restart Lipitor     Hyperlipidemia Medications               atorvastatin (LIPITOR) 20 MG tablet Take 1 tablet (20 mg total) by mouth once daily.       Lab Results   Component Value Date    CHOL 503 (H) 09/09/2021    CHOL 358 (H) 10/31/2019     Lab Results   Component Value Date    HDL 23 (L) 09/09/2021    HDL 38 (L) 10/31/2019     Lab Results   Component Value Date    LDLCALC Invalid, Trig>400.0 09/09/2021    LDLCALC Invalid, Trig>400.0 10/31/2019     Lab Results   Component Value Date    TRIG 2,795 (H) 09/09/2021    TRIG 1,261 (H) 10/31/2019     Lab Results   Component Value Date    CHOLHDL 4.6 (L) 09/09/2021    CHOLHDL 10.6 (L) 10/31/2019          The ASCVD Risk score (Kay MICHAELS, et al., 2019) failed to calculate for the following reasons:    The 2019 ASCVD risk score is only valid for ages 40 to 79           Relevant Medications    insulin (LANTUS SOLOSTAR U-100 INSULIN) glargine 100 units/mL SubQ pen    metFORMIN (GLUCOPHAGE-XR) 500 MG ER 24hr tablet    atorvastatin (LIPITOR) 20 MG tablet       Endocrine    Type 2 diabetes mellitus with hyperglycemia, without long-term current use of insulin    Overview     Uncontrolled, has been out of meds for years   Reports previously on ozempic with AE.   - restart lantus 10u and metformin  - labs today  - referral dm education   Lab Results   Component Value Date    HGBA1C 13.2 (H) 09/09/2021     -current meds:   Diabetes Medications               insulin (LANTUS SOLOSTAR U-100 INSULIN) glargine 100 units/mL SubQ pen Inject 10 Units into the skin every evening.    metFORMIN (GLUCOPHAGE-XR) 500 MG ER 24hr tablet Take 1 tablet (500 mg total)  by mouth every evening.   -on statin:   Hyperlipidemia Medications               atorvastatin (LIPITOR) 20 MG tablet Take 1 tablet (20 mg total) by mouth once daily.   -on ACE-I/ARB:   Hypertension Medications               losartan (COZAAR) 25 MG tablet Take 1 tablet (25 mg total) by mouth once daily.   -counseling provided on importance of diabetic diet and medication compliance in order to treat diabetes  -discussed diabetes disease course and potential complications  Follow up 3 months            Relevant Medications    insulin (LANTUS SOLOSTAR U-100 INSULIN) glargine 100 units/mL SubQ pen    metFORMIN (GLUCOPHAGE-XR) 500 MG ER 24hr tablet    Obesity (BMI 30-39.9)    Overview     Wt Readings from Last 3 Encounters:   08/25/23 0908 83.1 kg (183 lb 1.6 oz)   12/02/21 0901 89 kg (196 lb 1.6 oz)   11/23/21 1334 94.3 kg (207 lb 14.3 oz)     Diabetes Medications               insulin (LANTUS SOLOSTAR U-100 INSULIN) glargine 100 units/mL SubQ pen Inject 10 Units into the skin every evening.    metFORMIN (GLUCOPHAGE-XR) 500 MG ER 24hr tablet Take 1 tablet (500 mg total) by mouth every evening.     General weight loss/lifestyle modification strategies discussed: limit sugary drinks, exercise 3-5x per week  Informal exercise measures discussed, e.g. taking stairs instead of elevator.                 Diabetes mellitus due to underlying condition, uncontrolled, with hyperglycemia - Primary    Overview     Reports previously on ozempic with AE.          Relevant Medications    insulin (LANTUS SOLOSTAR U-100 INSULIN) glargine 100 units/mL SubQ pen    metFORMIN (GLUCOPHAGE-XR) 500 MG ER 24hr tablet    Other Relevant Orders    Comprehensive Metabolic Panel    Microalbumin/Creatinine Ratio, Urine    CBC Auto Differential    TSH    Lipid Panel    Hemoglobin A1C    Enlarged thyroid    Overview     Overt thyroid on exam  - US thyroid   - tsh          Relevant Orders    US Soft Tissue Head Neck Thyroid     Other Visit Diagnoses        Uncontrolled type 2 diabetes mellitus with hyperglycemia        Relevant Medications    insulin (LANTUS SOLOSTAR U-100 INSULIN) glargine 100 units/mL SubQ pen    metFORMIN (GLUCOPHAGE-XR) 500 MG ER 24hr tablet    losartan (COZAAR) 25 MG tablet    blood-glucose meter kit    lancets Misc    blood sugar diagnostic Strp    Other Relevant Orders    Comprehensive Metabolic Panel    Microalbumin/Creatinine Ratio, Urine    CBC Auto Differential    TSH    Lipid Panel    Hemoglobin A1C    Ambulatory referral/consult to Diabetic Advanced Practice Providers (Medical Management)    Pap smear for cervical cancer screening        Relevant Orders    Ambulatory referral/consult to Obstetrics / Gynecology              Patient ID: Olga Roger is a 37 y.o. female.    Chief Complaint:  establish care    Patient is here to establish care. Has a hx of  has a past medical history of Diabetes mellitus type 2 in nonobese (10/31/2018), Hypertriglyceridemia (11/01/2019), and Overweight (BMI 25.0-29.9).     Reports previously on ozempic with AE.     multiple life stressors, tearful in office today     History:  OBGYN: referral placed      LMP: No LMP recorded (lmp unknown). (Menstrual status: Birth Control).   MMG: m aunt, and mgm hx breast cancer    PAP: due, referral placed   Colonoscopy: No personal history of colon cancer, hematochezia, melena, crohn's, ulcerative colitis; No family history of colon cancer.      Health Maintenance Topics with due status: Not Due       Topic Last Completion Date    TETANUS VACCINE 02/16/2016    Foot Exam 08/25/2023    Influenza Vaccine Not Due        ==============================================  History reviewed.   Health Maintenance Due   Topic Date Due    Pneumococcal Vaccines (Age 0-64) (1 - PCV) Never done    Eye Exam  07/08/2021    COVID-19 Vaccine (3 - Pfizer series) 02/07/2022    Diabetes Urine Screening  09/09/2022    Lipid Panel  09/09/2022    Cervical Cancer Screening  10/24/2022     "Hemoglobin A1c  08/02/2023       Past Medical History:  Past Medical History:   Diagnosis Date    Diabetes mellitus type 2 in nonobese 10/31/2018    A1c >14% as of 11/1/19; Stopped metformin 2/2 diarrhea -> Lantus & atorvastatin on 11/1/19    Hypertriglyceridemia 11/01/2019    TG 1,261 mg/dL, for which we will consider fenofibrate if atorvastatin doesn't causes significant transaminitis (will also consider Lovaza)    Overweight (BMI 25.0-29.9)      History reviewed. No pertinent surgical history.  Review of patient's allergies indicates:   Allergen Reactions    Hewitt Hives     Current Outpatient Medications on File Prior to Visit   Medication Sig Dispense Refill    [DISCONTINUED] atorvastatin (LIPITOR) 20 MG tablet Take 1 tablet (20 mg total) by mouth once daily. 90 tablet 3    [DISCONTINUED] fenofibrate micronized (ANTARA) 43 mg capsule Take 1 capsule (43 mg total) by mouth before breakfast. 90 capsule 3    [DISCONTINUED] insulin NPH/Reg human (HUMULIN, 70/30,) 100 unit/mL (70-30) InPn pen Inject 25 Units into the skin 2 (two) times a day. 45 mL 3    [DISCONTINUED] ULTICARE PEN NEEDLE 32 gauge x 5/32" Ndle Use twice a day with insulin 100 each 11     Current Facility-Administered Medications on File Prior to Visit   Medication Dose Route Frequency Provider Last Rate Last Admin    levonorgestreL (MIRENA) 20 mcg/24 hours (7 yrs) 52 mg IUD 1 Intra Uterine Device  1 Intra Uterine Device Intrauterine  Tino Garcia NP   1 Intra Uterine Device at 11/23/21 1400     Social History     Socioeconomic History    Marital status: Single   Tobacco Use    Smoking status: Never    Smokeless tobacco: Never   Substance and Sexual Activity    Alcohol use: Never    Drug use: Never    Sexual activity: Yes     Partners: Male     Birth control/protection: None   Social History Narrative    Patient goal(s): Weight 150 lb by 4/30/2020    Motivation: Wants to be more active with her 3 year old and cure dm2 and be around " long-term for him     Breakfast: 10%: croissant, egg & cheese or egg & cheese wrap; Powerade    Lunch: Pork chops, gravy & peas; water    Dinner: Fennel cake; lemonade (previously Cokes: 5 cans/d)    Snacks: None    Eating out: 3x/wk    Water (oz/day): 84 oz/d    Physical Activity: walks 1 hr/d at work     Family History   Problem Relation Age of Onset    Diabetes Father     Blindness Father     No Known Problems Mother     Breast cancer Maternal Grandmother     Breast cancer Maternal Aunt 45          Review of Systems   12 point review of systems per hpi, otherwise negative         Objective:    Nursing note and vitals reviewed.  Vitals:    08/25/23 0908   BP: 117/83   Pulse: 90   Resp: 18     Body mass index is 30.47 kg/m².     Physical Exam   Constitutional: SHE is oriented to person, place, and time. She appears well-developed and well-nourished. No distress.   HENT: WNL  Head: Normocephalic and atraumatic.   Eyes: Pupils are equal, round, and reactive to light. EOM are normal.   Neck: Normal range of motion. Neck supple. Overt thyromegaly   Cardiovascular: Normal rate, regular rhythm, normal heart sounds and intact distal pulses.   No murmur heard.  Pulmonary/Chest: Effort normal and breath sounds normal. No respiratory distress. She has no wheezes.   GI: soft, non distended, no ttp, no rebound/guarding  Musculoskeletal: Normal range of motion. She exhibits no edema.   Neurological: She is alert and oriented to person, place, and time. No cranial nerve deficit.   Skin: Skin is warm and dry. Capillary refill takes less than 2 seconds.   Psychiatric: She has a normal mood and affect. Her behavior is normal.           Saskia Albert MD    We Offer Telehealth & Same Day Appointments!   Book your Telehealth appointment with me through my nurse or   Clinic appointments on Open[employer]!  [phone]     To Schedule appointments online, go to OpenNews: [email or web address]

## 2023-08-28 ENCOUNTER — TELEPHONE (OUTPATIENT)
Dept: DIABETES | Facility: CLINIC | Age: 38
End: 2023-08-28
Payer: COMMERCIAL

## 2023-08-28 ENCOUNTER — PATIENT MESSAGE (OUTPATIENT)
Dept: FAMILY MEDICINE | Facility: CLINIC | Age: 38
End: 2023-08-28
Payer: COMMERCIAL

## 2023-08-28 NOTE — PROGRESS NOTES
[facility] a1c and lipid    I have sent a msg to patient with the following interpretation (see below):      Dear Ms.Leroyal ANÍBAL Roger       I have reviewed your recent blood work:     - Your complete blood count is unremarkable      - A1c is >14. Please start insulin and metformin. Please send blood sugar readings over next few weeks. We will recheck labs in     - Thyroid function is normal     Your cholesterol and triglycerides HIGH (improved from previous). Continue cholesterol medications. Continue to limit fatty foods; participate in regular exercise.     Please do not hesitate to call or message with any additional questions or concerns.  Saskia Albert MD

## 2023-09-18 ENCOUNTER — TELEPHONE (OUTPATIENT)
Dept: DIABETES | Facility: CLINIC | Age: 38
End: 2023-09-18
Payer: COMMERCIAL

## 2023-09-19 ENCOUNTER — TELEPHONE (OUTPATIENT)
Dept: DIABETES | Facility: CLINIC | Age: 38
End: 2023-09-19
Payer: COMMERCIAL

## 2024-01-30 PROBLEM — R11.2 INTRACTABLE NAUSEA AND VOMITING: Status: ACTIVE | Noted: 2024-01-30

## 2024-01-30 PROBLEM — N17.9 AKI (ACUTE KIDNEY INJURY): Status: ACTIVE | Noted: 2024-01-30

## 2024-02-01 ENCOUNTER — TELEPHONE (OUTPATIENT)
Dept: DIABETES | Facility: CLINIC | Age: 39
End: 2024-02-01
Payer: COMMERCIAL

## 2024-02-01 ENCOUNTER — TELEPHONE (OUTPATIENT)
Dept: FAMILY MEDICINE | Facility: CLINIC | Age: 39
End: 2024-02-01
Payer: COMMERCIAL

## 2024-02-01 DIAGNOSIS — E11.65 TYPE 2 DIABETES MELLITUS WITH HYPERGLYCEMIA, WITHOUT LONG-TERM CURRENT USE OF INSULIN: Primary | ICD-10-CM

## 2024-02-01 NOTE — TELEPHONE ENCOUNTER
I have signed for the following orders AND/OR meds.  Please call the patient and ask the patient to schedule the testing AND/OR inform about any medications that were sent.      Orders Placed This Encounter   Procedures    Ambulatory referral/consult to Diabetes Education     Standing Status:   Future     Standing Expiration Date:   2/1/2025     Referral Priority:   Routine     Referral Type:   Consultation     Referral Reason:   Specialty Services Required     Requested Specialty:   Diabetes     Number of Visits Requested:   1     Expiration Date:   2/1/2025    Ambulatory referral/consult to Diabetic Advanced Practice Providers (Medical Management)     Standing Status:   Future     Standing Expiration Date:   3/1/2025     Referral Priority:   Routine     Referral Type:   Consultation     Referral Reason:   Specialty Services Required     Referred to Provider:   Talya De León FNP     Number of Visits Requested:   1

## 2024-03-01 ENCOUNTER — PATIENT MESSAGE (OUTPATIENT)
Dept: FAMILY MEDICINE | Facility: CLINIC | Age: 39
End: 2024-03-01
Payer: COMMERCIAL

## 2024-03-01 DIAGNOSIS — R11.0 NAUSEA: Primary | ICD-10-CM

## 2024-03-04 RX ORDER — PROMETHAZINE HYDROCHLORIDE 12.5 MG/1
12.5 SUPPOSITORY RECTAL EVERY 6 HOURS PRN
Qty: 12 SUPPOSITORY | Refills: 1 | Status: SHIPPED | OUTPATIENT
Start: 2024-03-04 | End: 2024-03-21 | Stop reason: SDUPTHER

## 2024-03-04 NOTE — TELEPHONE ENCOUNTER
Pt needs appt       Medications Ordered This Encounter   Medications    promethazine (PHENERGAN) 12.5 MG Supp     Sig: Place 1 suppository (12.5 mg total) rectally every 6 (six) hours as needed.     Dispense:  12 suppository     Refill:  1

## 2024-03-07 ENCOUNTER — OFFICE VISIT (OUTPATIENT)
Dept: DIABETES | Facility: CLINIC | Age: 39
End: 2024-03-07
Payer: COMMERCIAL

## 2024-03-07 VITALS
WEIGHT: 148.38 LBS | SYSTOLIC BLOOD PRESSURE: 119 MMHG | DIASTOLIC BLOOD PRESSURE: 90 MMHG | BODY MASS INDEX: 24.72 KG/M2 | HEART RATE: 120 BPM | HEIGHT: 65 IN

## 2024-03-07 DIAGNOSIS — E11.65 TYPE 2 DIABETES MELLITUS WITH HYPERGLYCEMIA, WITHOUT LONG-TERM CURRENT USE OF INSULIN: Primary | ICD-10-CM

## 2024-03-07 DIAGNOSIS — E11.69 HYPERLIPIDEMIA ASSOCIATED WITH TYPE 2 DIABETES MELLITUS: ICD-10-CM

## 2024-03-07 DIAGNOSIS — E78.5 HYPERLIPIDEMIA ASSOCIATED WITH TYPE 2 DIABETES MELLITUS: ICD-10-CM

## 2024-03-07 LAB — GLUCOSE SERPL-MCNC: 222 MG/DL (ref 70–110)

## 2024-03-07 PROCEDURE — 3080F DIAST BP >= 90 MM HG: CPT | Mod: CPTII,S$GLB,, | Performed by: NURSE PRACTITIONER

## 2024-03-07 PROCEDURE — 3051F HG A1C>EQUAL 7.0%<8.0%: CPT | Mod: CPTII,S$GLB,, | Performed by: NURSE PRACTITIONER

## 2024-03-07 PROCEDURE — 99204 OFFICE O/P NEW MOD 45 MIN: CPT | Mod: S$GLB,,, | Performed by: NURSE PRACTITIONER

## 2024-03-07 PROCEDURE — 3074F SYST BP LT 130 MM HG: CPT | Mod: CPTII,S$GLB,, | Performed by: NURSE PRACTITIONER

## 2024-03-07 PROCEDURE — 3008F BODY MASS INDEX DOCD: CPT | Mod: CPTII,S$GLB,, | Performed by: NURSE PRACTITIONER

## 2024-03-07 PROCEDURE — 82962 GLUCOSE BLOOD TEST: CPT | Mod: S$GLB,,, | Performed by: NURSE PRACTITIONER

## 2024-03-07 PROCEDURE — 99999 PR PBB SHADOW E&M-EST. PATIENT-LVL IV: CPT | Mod: PBBFAC,,, | Performed by: NURSE PRACTITIONER

## 2024-03-07 PROCEDURE — 1159F MED LIST DOCD IN RCRD: CPT | Mod: CPTII,S$GLB,, | Performed by: NURSE PRACTITIONER

## 2024-03-07 RX ORDER — BLOOD-GLUCOSE SENSOR
1 EACH MISCELLANEOUS
Qty: 3 EACH | Refills: 11 | Status: SHIPPED | OUTPATIENT
Start: 2024-03-07 | End: 2025-03-07

## 2024-03-07 RX ORDER — INSULIN LISPRO 100 [IU]/ML
2-8 INJECTION, SOLUTION INTRAVENOUS; SUBCUTANEOUS EVERY 4 HOURS
Qty: 14.4 ML | Refills: 11 | Status: SHIPPED | OUTPATIENT
Start: 2024-03-07 | End: 2025-03-07

## 2024-03-07 RX ORDER — BLOOD-GLUCOSE,RECEIVER,CONT
1 EACH MISCELLANEOUS DAILY
Qty: 1 EACH | Refills: 0 | Status: SHIPPED | OUTPATIENT
Start: 2024-03-07 | End: 2024-04-01

## 2024-03-07 NOTE — PROGRESS NOTES
Olga Roger is a 38 y.o. female who  has a past medical history of Diabetes mellitus type 2 in nonobese (10/31/2018), Hypertriglyceridemia (11/01/2019), and Overweight (BMI 25.0-29.9)., who present for an initial evaluation of Type 2 diabetes mellitus.     CHIEF COMPLAINT: Diabetes Consultation    PCP: Saskia Albert MD     The patient was initially diagnosed with diabetes at age 28.    Previous failed treatments include:  Metformin -  Trulicity/Ozempic - n/v.     Social Documentation:  Patient lives in [place]  Occupation: works for [employer], in [employer].  Exercise: none.    Current monitoring regimen: capillary blood glucose monitoring with finger sticks.  Fasting:     Current Diabetes related symptoms/problems include hyperglycemia, nausea, paresthesia of the feet, visual disturbances, vomitting, and weight loss and have been unchanged.     Diabetes related complications:   none.   denies Pancreatitis  reports possible Gastroparesis -   reports DKA - admitted 12/27/24.  denies Hx/family Hx of MEN2/MTC  denies Frequent UTIs/yeast infections    Cardiovascular Risk Factors: dyslipidemia and hypertension.    Patient currently taking insulin or sulfonylurea?  Yes. Emergency Glucagon Prescription current? no    Any episodes of hypoglycemia? No.   Hypoglycemia Unawareness? No  Severe hypoglycemia requiring 3rd party? No    Last seen by Diabetes Education: none    Diabetes Medications               insulin (LANTUS SOLOSTAR U-100 INSULIN) glargine 100 units/mL SubQ pen Inject 10 Units into the skin every evening. - TAKING 30 UNITS DAILY.     metFORMIN (GLUCOPHAGE-XR) 500 MG ER 24hr tablet Take 1 tablet (500 mg total) by mouth every evening. - Not taking.      DIABETES DISEASE MANAGEMENT STATUS  Statin: Taking  ACE/ARB: Taking  Screening or Prevention Patient's value Goal Complete/Controlled?   HgA1C Testing and Control   Lab Results   Component Value Date    HGBA1C 7.1 (H) 01/28/2024      Annually/Less than 8% Yes  "  Lipid profile : 08/25/2023 Annually Yes   LDL control Lab Results   Component Value Date    LDLCALC Invalid, Trig>400.0 08/25/2023    Annually/Less than 100 mg/dl  Yes   Nephropathy screening Lab Results   Component Value Date    LABMICR <5.0 08/25/2023     Lab Results   Component Value Date    PROTEINUA 2+ (A) 01/30/2024     No results found for: "UTPCR"   Annually Yes   Blood pressure BP Readings from Last 1 Encounters:   03/07/24 (!) 119/90    Less than 140/90 Yes   Dilated retinal exam : 07/08/2020 Annually No   Foot exam   : 08/25/2023 Annually Yes   Patient's medications, allergies, past medical, surgical, social and family histories were reviewed and updated as appropriate.     Review of Systems   Constitutional:  Negative for weight loss.   Eyes:  Negative for blurred vision and double vision.   Cardiovascular:  Negative for chest pain.   Gastrointestinal:  Positive for nausea and vomiting.   Genitourinary:  Negative for frequency.   Musculoskeletal:  Negative for falls.   Neurological:  Positive for dizziness and weakness.   Endo/Heme/Allergies:  Negative for polydipsia.   Psychiatric/Behavioral:  Negative for depression.    All other systems reviewed and are negative.       Physical Exam  Constitutional:       Appearance: Normal appearance.   HENT:      Head: Normocephalic and atraumatic.   Eyes:      Extraocular Movements: Extraocular movements intact.      Pupils: Pupils are equal, round, and reactive to light.   Cardiovascular:      Rate and Rhythm: Normal rate and regular rhythm.      Heart sounds: Normal heart sounds.   Pulmonary:      Effort: Pulmonary effort is normal. No respiratory distress.      Breath sounds: Normal breath sounds.   Musculoskeletal:         General: No swelling.      Cervical back: Normal range of motion.      Comments: In WC   Skin:     General: Skin is warm and dry.   Neurological:      Mental Status: She is alert and oriented to person, place, and time.   Psychiatric:       " "  Mood and Affect: Mood normal.         Behavior: Behavior normal.          Blood pressure (!) 119/90, pulse (!) 120, height 5' 5" (1.651 m), weight 67.3 kg (148 lb 6.4 oz).  Wt Readings from Last 3 Encounters:   03/07/24 67.3 kg (148 lb 6.4 oz)   01/30/24 83.1 kg (183 lb 3.2 oz)   08/25/23 83.1 kg (183 lb 1.6 oz)       LAB REVIEW  Lab Results   Component Value Date     01/31/2024    K 3.0 (L) 01/31/2024     (H) 01/31/2024    CO2 22 01/31/2024    BUN 12 01/31/2024    CREATININE 1.10 01/31/2024    CALCIUM 8.2 (L) 01/31/2024    ANIONGAP 9 01/31/2024    EGFRNORACEVR >60 01/31/2024     No results found for: "CPEPTIDE", "GLUTAMICACID", "INSLNABS"  Hemoglobin A1C   Date Value Ref Range Status   01/28/2024 7.1 (H) 4.6 - 6.2 % Final   12/27/2023 >14.0 (H) 4.6 - 6.2 % Final   08/25/2023 >14.0 (H) 4.0 - 5.6 % Final     Comment:     ADA Screening Guidelines:  5.7-6.4%  Consistent with prediabetes  >or=6.5%  Consistent with diabetes    High levels of fetal hemoglobin interfere with the HbA1C  assay. Heterozygous hemoglobin variants (HbS, HgC, etc)do  not significantly interfere with this assay.   However, presence of multiple variants may affect accuracy.     09/09/2021 13.2 (H) 4.0 - 5.6 % Final     Comment:     ADA Screening Guidelines:  5.7-6.4%  Consistent with prediabetes  >or=6.5%  Consistent with diabetes    High levels of fetal hemoglobin interfere with the HbA1C  assay. Heterozygous hemoglobin variants (HbS, HgC, etc)do  not significantly interfere with this assay.   However, presence of multiple variants may affect accuracy.     10/31/2019 >14.0 (H) 4.0 - 5.6 % Final     Comment:     ADA Screening Guidelines:  5.7-6.4%  Consistent with prediabetes  >or=6.5%  Consistent with diabetes  High levels of fetal hemoglobin interfere with the HbA1C  assay. Heterozygous hemoglobin variants (HbS, HgC, etc)do  not significantly interfere with this assay.   However, presence of multiple variants may affect accuracy.   "       Lab Results   Component Value Date    POCGLU 222 (A) 03/07/2024       ASSESSMENT    ICD-10-CM ICD-9-CM   1. Type 2 diabetes mellitus with hyperglycemia, without long-term current use of insulin  E11.65 250.00     790.29   2. Hyperlipidemia associated with type 2 diabetes mellitus  E11.69 250.80    E78.5 272.4        PLAN  Diagnoses and all orders for this visit:    Type 2 diabetes mellitus with hyperglycemia, without long-term current use of insulin  -     Ambulatory referral/consult to Diabetic Advanced Practice Providers (Medical Management)  -     POCT Glucose, Hand-Held Device  -     blood-glucose sensor (DEXCOM G7 SENSOR) Hui; 1 Device by Misc.(Non-Drug; Combo Route) route every 10 days.  -     blood-glucose meter,continuous (DEXCOM G7 ) Misc; 1 Device by Misc.(Non-Drug; Combo Route) route once daily.  -     insulin lispro (HUMALOG KWIKPEN INSULIN) 100 unit/mL pen; Inject 2-8 Units into the skin every 4 (four) hours. USING CORRECTION SCALE.  -     Comprehensive Metabolic Panel; Future  -     Glutamic Acid Decarboxylase; Future  -     C-Peptide; Future  -     Hemoglobin A1C; Future  -     Insulin Antibody; Future  -     ANTI-ISLET CELL ANTIBODY; Future  -     Microalbumin/Creatinine Ratio, Urine; Future  -     Vitamin B12; Future  -     Vitamin D; Future  -     TSH; Future    Hyperlipidemia associated with type 2 diabetes mellitus        Reviewed pathophysiology of diabetes, complications related to the disease, importance of annual dilated eye exam and daily foot examination. Explained MOA, SE, dosage of medications. Written instructions given and reviewed with patient and patient verbalizes understanding. Discussed importance of A1c less than 7% to reduce risk of micro and macro complications, including nephropathy, neuropathy, retinopathy, heart attack and stroke. Reviewed the importance of controlled Blood Pressure and Cholesterol Lab Values in preventing disease.     2/7/2024 - 38 year old,  "diagnosed with Type 2 DM at age 28. Hospitalized with cellulitis, necrotizing fasciitis in December, requiring surgical debridement and wound vac. No longer has wound vac, but is follow by wound care. Also was in DKA in December, since them multiple ED visits with intractable N/V, LESLIE. States she is scheduled for an "upper GI" tomorrow, unknown whether this is a gastric emptying study as patient is likely dealing with undiagnosed gastroparesis. Home glucose logs reviewed with fasting levels improving, post prandial evening glucoses over 200. Reports compliance with lantus, but has stopped metformin. We will start correction dosing today and order G7 CGM. Will will also order labs to be done prior to f/u.     PATIENT INSTRUCTIONS    Continue Lantus 30 units subcutaneously daily.   Discontinue Metformin.   Start Humalog Correction Dosing every 4 hours as needed OUTSIDE OF EATING    If  - 250, may take 2 units of Humalog  If  - 300, may take 5 units of Humalog   If  - 350, may take 6 units of Humalog  If  - 400, may take 7 units of Humalog  If +, may take 8 units of Humalog     G7 CGM ordered. Patient is on an intensive insulin regimen with MDI. Patient has demonstrated an understanding of technology and is motivated to use the device correctly. Patient is expected to adhere to a diabetes treatment plan and is capable of recognizing alerts and alarms.   Send message in [facility] portal or call our office to schedule training once you receive monitoring equipment.     Follow up for Dexcom.    Portions of this note were prepared with [employer]ig[employer] Naturally Speaking voice recognition transcription software. Grammatical errors, including garbled syntax, mangle pronouns, and other bizarre constructions may be attributed to that software system.     "

## 2024-03-07 NOTE — PATIENT INSTRUCTIONS
PATIENT INSTRUCTIONS    Continue Lantus 30 units subcutaneously daily.   Discontinue Metformin.   Start Humalog Correction Dosing every 4 hours as needed OUTSIDE OF EATING    If  - 250, may take 2 units of Humalog  If  - 300, may take 5 units of Humalog   If  - 350, may take 6 units of Humalog  If  - 400, may take 7 units of Humalog  If +, may take 8 units of Humalog     G7 CGM ordered. Patient is on an intensive insulin regimen with MDI. Patient has demonstrated an understanding of technology and is motivated to use the device correctly. Patient is expected to adhere to a diabetes treatment plan and is capable of recognizing alerts and alarms.   Send message in [facility] portal or call our office to schedule training once you receive monitoring equipment.

## 2024-03-08 ENCOUNTER — TELEPHONE (OUTPATIENT)
Dept: DIABETES | Facility: CLINIC | Age: 39
End: 2024-03-08
Payer: COMMERCIAL

## 2024-03-08 ENCOUNTER — PATIENT OUTREACH (OUTPATIENT)
Dept: ADMINISTRATIVE | Facility: HOSPITAL | Age: 39
End: 2024-03-08
Payer: COMMERCIAL

## 2024-03-08 NOTE — TELEPHONE ENCOUNTER
----- Message from Socorro Carrera NP sent at 3/8/2024  8:15 AM CST -----  Regarding: RE: pharmacy called  Coverage for Pam - clarification for pharmacist: Yes, they can use the sliding scale as needed every 4 hours. This sliding scale can be used 6 times per day.    ----- Message -----  From: Luciana Alexander MA  Sent: 3/7/2024   4:58 PM CST  To: CHRIS Avila  Subject: FW: pharmacy called                                ----- Message -----  From: Nataliya Herrera  Sent: 3/7/2024   4:41 PM CST  To: [facility] Staff  Subject: pharmacy called                                   Pharmacy Calling to Clarify an RX    Name of EMIR Bryan [[id number]] Shaylee     Pharmacy Name [facility]     Prescription Nameinsulin lispro (HUMALOG KWIKPEN INSULIN) 100 unit/mL pen    What do they need to clarify? Clarify if patient needs to inject 6 times a day. Please advise     Best Call Back Number [phone]

## 2024-03-08 NOTE — PROGRESS NOTES
DM EYE EXAM OPPORTUNITY PAYOR REPORT:    Care Coordination Encounter Details:       MyChart Portal Status:         [x]  Reviewed MyChart Portal Status offered / enrolled if applicable        Additional Notes:     MyChart Outcomes: Pt is enrolled & active          Updates Requested / Reviewed:        Updated Care Coordination Note         Health Maintenance Screening(s) Due:      Health Maintenance Topics Overdue:      VBHM Score: 2     Cervical Cancer Screening  Eye Exam                       Health Maintenance Topic(s) Outreach Outcomes & Actions Taken:    Eye Exam - Outreach Outcomes & Actions Taken  : Pt Declined Scheduling Eye Exam    Cervical Cancer Screening - Outreach Outcomes & Actions Taken  : Pt will self schedule         Additional Notes:  Pt will think about DM eye exam at later time.           Chronic Disease Management:     Diabetes Measures        Lab Results   Component Value Date    HGBA1C 7.1 (H) 01/28/2024           [x]  Reviewed chart for active Diabetes diagnosis     []  Scheduled necessary follow up appointments if needed         Additional Notes:    Appt scheduled with DM clinic  04/25/24         Hypertension Measures        BP Readings from Last 1 Encounters:   03/07/24 (!) 119/90           []  Reviewed chart for active Hypertension diagnosis     []  Reviewed & documented Home BP Cuff     []  Documented a Remote BP if needed & applicable     []  Scheduled necessary follow up appointments with Primary Care if needed         Additional Notes:  N/A           Provider Team Continuity:     Last PCP Visit Date: 8/25/2023          [x]  Reviewed Primary Care Provider Visits, Annual Wellness Visit, and Future          Appointments to ensure appointments have been scheduled and/or           completed        Additional Notes:             Social Determinants of Health          []  Reviewed, completed, and/or updated the following sections:                  Food Insecurity, Transportation Needs,  Financial Resource Strain,                 Tobacco Use        Additional Notes:  N/A         Care Management, Digital Medicine, and/or Education Referrals    OPCM Risk Score: 85.6         Next Steps - Referral Actions: None        Additional Notes:

## 2024-03-21 ENCOUNTER — OFFICE VISIT (OUTPATIENT)
Dept: FAMILY MEDICINE | Facility: CLINIC | Age: 39
End: 2024-03-21
Payer: COMMERCIAL

## 2024-03-21 VITALS
TEMPERATURE: 97 F | BODY MASS INDEX: 24.01 KG/M2 | SYSTOLIC BLOOD PRESSURE: 125 MMHG | WEIGHT: 144.13 LBS | HEIGHT: 65 IN | OXYGEN SATURATION: 100 % | HEART RATE: 126 BPM | DIASTOLIC BLOOD PRESSURE: 88 MMHG

## 2024-03-21 DIAGNOSIS — K59.00 CONSTIPATION, UNSPECIFIED CONSTIPATION TYPE: Primary | ICD-10-CM

## 2024-03-21 DIAGNOSIS — R10.9 ABDOMINAL PAIN, UNSPECIFIED ABDOMINAL LOCATION: ICD-10-CM

## 2024-03-21 DIAGNOSIS — R00.0 TACHYCARDIA: ICD-10-CM

## 2024-03-21 DIAGNOSIS — R11.0 NAUSEA: ICD-10-CM

## 2024-03-21 PROCEDURE — 3079F DIAST BP 80-89 MM HG: CPT | Mod: CPTII,S$GLB,, | Performed by: STUDENT IN AN ORGANIZED HEALTH CARE EDUCATION/TRAINING PROGRAM

## 2024-03-21 PROCEDURE — 3044F HG A1C LEVEL LT 7.0%: CPT | Mod: CPTII,S$GLB,, | Performed by: STUDENT IN AN ORGANIZED HEALTH CARE EDUCATION/TRAINING PROGRAM

## 2024-03-21 PROCEDURE — 99214 OFFICE O/P EST MOD 30 MIN: CPT | Mod: S$GLB,,, | Performed by: STUDENT IN AN ORGANIZED HEALTH CARE EDUCATION/TRAINING PROGRAM

## 2024-03-21 PROCEDURE — 99999 PR PBB SHADOW E&M-EST. PATIENT-LVL IV: CPT | Mod: PBBFAC,,, | Performed by: STUDENT IN AN ORGANIZED HEALTH CARE EDUCATION/TRAINING PROGRAM

## 2024-03-21 PROCEDURE — 3074F SYST BP LT 130 MM HG: CPT | Mod: CPTII,S$GLB,, | Performed by: STUDENT IN AN ORGANIZED HEALTH CARE EDUCATION/TRAINING PROGRAM

## 2024-03-21 PROCEDURE — 3008F BODY MASS INDEX DOCD: CPT | Mod: CPTII,S$GLB,, | Performed by: STUDENT IN AN ORGANIZED HEALTH CARE EDUCATION/TRAINING PROGRAM

## 2024-03-21 RX ORDER — PROMETHAZINE HYDROCHLORIDE 12.5 MG/1
12.5 SUPPOSITORY RECTAL EVERY 6 HOURS PRN
Qty: 12 SUPPOSITORY | Refills: 1 | Status: SHIPPED | OUTPATIENT
Start: 2024-03-21 | End: 2024-06-19

## 2024-03-21 RX ORDER — HYOSCYAMINE SULFATE 0.12 MG/1
0.12 TABLET, ORALLY DISINTEGRATING SUBLINGUAL EVERY 6 HOURS PRN
Qty: 30 TABLET | Refills: 3 | Status: ON HOLD | OUTPATIENT
Start: 2024-03-21 | End: 2024-04-05 | Stop reason: HOSPADM

## 2024-03-21 RX ORDER — ATORVASTATIN CALCIUM 20 MG/1
20 TABLET, FILM COATED ORAL DAILY
Qty: 90 TABLET | Refills: 3 | Status: ON HOLD | OUTPATIENT
Start: 2024-03-21 | End: 2024-04-05 | Stop reason: HOSPADM

## 2024-03-21 NOTE — PROGRESS NOTES
Problem List Items Addressed This Visit          Cardiac/Vascular    Tachycardia       GI    Nausea    Overview     Chronic, intermittent hx  Prn phenergan  Inability to tolerate fluids   Advised to go to ed. Family is with her and will bring her         Relevant Medications    promethazine (PHENERGAN) 12.5 MG Supp    Other Relevant Orders    X-Ray KUB    Abdominal pain    Constipation - Primary    Relevant Orders    X-Ray KUB     Given pt tachycardia and inability to tolerate PO, advised to go to nearest ED. Here with her mom. Mom will bring her.       Follow up in about 2 weeks (around 4/4/2024), or if symptoms worsen or fail to improve.    Saskia Albert MD  _________________________________________________________________________      Patient ID: Olga Roger is a 38 y.o. female.    Chief Complaint:  tachycardia     Hx of multiple hospital admissions for hyperglycemia. Currently wearing dexcom with bg 130s. Several days of intermittent Nausea and abd cramping. Last bm saturday    Has GI follow up next week.     Denies fevers, chills, chest pain, SOB, dysuria, hematuria, hematochezia, or melena.     Past medical histories reviewed, including past medical, surgical, family and social histories.      Current Outpatient Medications on File Prior to Visit   Medication Sig Dispense Refill    blood sugar diagnostic Strp To check BG 3 times daily, to use with insurance preferred meter 100 each 0    blood-glucose meter kit To check BG 3 times daily, to use with insurance preferred meter 100 each 1    blood-glucose sensor (DEXCOM G7 SENSOR) Hui 1 Device by Misc.(Non-Drug; Combo Route) route every 10 days. 3 each 11    insulin lispro (HUMALOG KWIKPEN INSULIN) 100 unit/mL pen Inject 2-8 Units into the skin every 4 (four) hours. USING CORRECTION SCALE. 14.4 mL 11    lancets Misc To check BG 3 times daily, to use with insurance preferred meter 100 each 1    EScitalopram oxalate (LEXAPRO) 10 MG tablet Take 1 tablet (10 mg  total) by mouth once daily. Take half tab daily for 7 days; then, increase to 1 tab daily (Patient not taking: Reported on 3/7/2024) 90 tablet 3     Current Facility-Administered Medications on File Prior to Visit   Medication Dose Route Frequency Provider Last Rate Last Admin    levonorgestreL (MIRENA) 20 mcg/24 hours (7 yrs) 52 mg IUD 1 Intra Uterine Device  1 Intra Uterine Device Intrauterine  Tino Garcia, NP   1 Intra Uterine Device at 11/23/21 1400       Review of Systems   12 point review of systems negative except for listed in HPI.     Objective:    Nursing note and vitals reviewed.  Vitals:    03/21/24 1300   BP: 125/88   Pulse: (!) 126   Temp: 96.9 °F (36.1 °C)     Body mass index is 23.98 kg/m².     Physical Exam   Constitutional: oriented to person, place, and time. well-developed and well-nourished. Appears uncomfortable, dry heaving.   HENT: WNL  Head: Normocephalic and atraumatic.   Eyes: EOM are normal.   Neck: Normal range of motion. Neck supple.   Cardiovascular: tachycardic  Pulmonary/Chest: Effort normal. No respiratory distress.   GI: soft, non distended, no ttp, no rebound/guarding  Musculoskeletal: Normal range of motion. no edema.   Neurological: CN II-XII intact  Skin: warm and dry.   Psychiatric: tearful        We Offer Telehealth & Same Day Appointments!   Book your Telehealth appointment with me through my nurse or   Clinic appointments on [place]!  [phone]     To Schedule appointments online, go to [employer]: [email or web address]

## 2024-04-01 DIAGNOSIS — E11.65 TYPE 2 DIABETES MELLITUS WITH HYPERGLYCEMIA, WITHOUT LONG-TERM CURRENT USE OF INSULIN: ICD-10-CM

## 2024-04-01 PROBLEM — K20.90 ESOPHAGITIS: Status: ACTIVE | Noted: 2024-04-01

## 2024-04-01 PROBLEM — E86.1 INTRAVASCULAR VOLUME DEPLETION: Status: ACTIVE | Noted: 2024-04-01

## 2024-04-01 RX ORDER — BLOOD-GLUCOSE,RECEIVER,CONT
EACH MISCELLANEOUS
Qty: 1 EACH | Refills: 0 | Status: SHIPPED | OUTPATIENT
Start: 2024-04-01

## 2024-04-02 PROBLEM — R00.0 SINUS TACHYCARDIA: Status: ACTIVE | Noted: 2024-04-02

## 2024-04-02 PROBLEM — K31.84 DIABETIC GASTROPARESIS: Status: ACTIVE | Noted: 2024-01-30

## 2024-04-02 PROBLEM — E11.43 DIABETIC GASTROPARESIS: Status: ACTIVE | Noted: 2024-01-30

## 2024-04-03 PROBLEM — T73.0XXA STARVATION KETOACIDOSIS: Status: ACTIVE | Noted: 2024-04-03

## 2024-04-03 PROBLEM — E87.29 STARVATION KETOACIDOSIS: Status: ACTIVE | Noted: 2024-04-03

## 2024-04-04 PROBLEM — E83.42 HYPOMAGNESEMIA: Status: ACTIVE | Noted: 2024-04-04

## 2024-04-07 PROBLEM — R00.0 TACHYCARDIA: Status: ACTIVE | Noted: 2024-04-07

## 2024-04-07 PROBLEM — R10.9 ABDOMINAL PAIN: Status: ACTIVE | Noted: 2024-04-07

## 2024-04-07 PROBLEM — R11.0 NAUSEA: Status: ACTIVE | Noted: 2024-04-07

## 2024-04-07 PROBLEM — K59.00 CONSTIPATION: Status: ACTIVE | Noted: 2024-04-07

## 2024-04-09 ENCOUNTER — OFFICE VISIT (OUTPATIENT)
Dept: FAMILY MEDICINE | Facility: CLINIC | Age: 39
End: 2024-04-09
Payer: COMMERCIAL

## 2024-04-09 VITALS
RESPIRATION RATE: 18 BRPM | HEART RATE: 95 BPM | SYSTOLIC BLOOD PRESSURE: 115 MMHG | HEIGHT: 65 IN | WEIGHT: 146.31 LBS | TEMPERATURE: 97 F | BODY MASS INDEX: 24.38 KG/M2 | OXYGEN SATURATION: 98 % | DIASTOLIC BLOOD PRESSURE: 79 MMHG

## 2024-04-09 DIAGNOSIS — E11.43 DIABETIC GASTROPARESIS: ICD-10-CM

## 2024-04-09 DIAGNOSIS — K31.84 DIABETIC GASTROPARESIS: ICD-10-CM

## 2024-04-09 DIAGNOSIS — E11.65 TYPE 2 DIABETES MELLITUS WITH HYPERGLYCEMIA, WITH LONG-TERM CURRENT USE OF INSULIN: ICD-10-CM

## 2024-04-09 DIAGNOSIS — Z79.4 TYPE 2 DIABETES MELLITUS WITH HYPERGLYCEMIA, WITH LONG-TERM CURRENT USE OF INSULIN: ICD-10-CM

## 2024-04-09 DIAGNOSIS — R00.0 TACHYCARDIA: ICD-10-CM

## 2024-04-09 DIAGNOSIS — E83.42 HYPOMAGNESEMIA: ICD-10-CM

## 2024-04-09 DIAGNOSIS — R11.0 NAUSEA: Primary | ICD-10-CM

## 2024-04-09 PROBLEM — K59.09 CHRONIC CONSTIPATION: Status: ACTIVE | Noted: 2024-04-07

## 2024-04-09 PROCEDURE — 1159F MED LIST DOCD IN RCRD: CPT | Mod: CPTII,S$GLB,, | Performed by: STUDENT IN AN ORGANIZED HEALTH CARE EDUCATION/TRAINING PROGRAM

## 2024-04-09 PROCEDURE — 99496 TRANSJ CARE MGMT HIGH F2F 7D: CPT | Mod: S$GLB,,, | Performed by: STUDENT IN AN ORGANIZED HEALTH CARE EDUCATION/TRAINING PROGRAM

## 2024-04-09 PROCEDURE — 1160F RVW MEDS BY RX/DR IN RCRD: CPT | Mod: CPTII,S$GLB,, | Performed by: STUDENT IN AN ORGANIZED HEALTH CARE EDUCATION/TRAINING PROGRAM

## 2024-04-09 PROCEDURE — 99999 PR PBB SHADOW E&M-EST. PATIENT-LVL V: CPT | Mod: PBBFAC,,, | Performed by: STUDENT IN AN ORGANIZED HEALTH CARE EDUCATION/TRAINING PROGRAM

## 2024-04-09 PROCEDURE — 3078F DIAST BP <80 MM HG: CPT | Mod: CPTII,S$GLB,, | Performed by: STUDENT IN AN ORGANIZED HEALTH CARE EDUCATION/TRAINING PROGRAM

## 2024-04-09 PROCEDURE — 3044F HG A1C LEVEL LT 7.0%: CPT | Mod: CPTII,S$GLB,, | Performed by: STUDENT IN AN ORGANIZED HEALTH CARE EDUCATION/TRAINING PROGRAM

## 2024-04-09 PROCEDURE — 1111F DSCHRG MED/CURRENT MED MERGE: CPT | Mod: CPTII,S$GLB,, | Performed by: STUDENT IN AN ORGANIZED HEALTH CARE EDUCATION/TRAINING PROGRAM

## 2024-04-09 PROCEDURE — 3074F SYST BP LT 130 MM HG: CPT | Mod: CPTII,S$GLB,, | Performed by: STUDENT IN AN ORGANIZED HEALTH CARE EDUCATION/TRAINING PROGRAM

## 2024-04-09 RX ORDER — METOCLOPRAMIDE 5 MG/1
5 TABLET ORAL
Qty: 120 TABLET | Refills: 3 | Status: ON HOLD | OUTPATIENT
Start: 2024-04-09 | End: 2024-04-30 | Stop reason: HOSPADM

## 2024-04-09 RX ORDER — LANOLIN ALCOHOL/MO/W.PET/CERES
400 CREAM (GRAM) TOPICAL DAILY
Qty: 30 TABLET | Refills: 2 | Status: SHIPPED | OUTPATIENT
Start: 2024-04-09

## 2024-04-09 RX ORDER — METOPROLOL SUCCINATE 50 MG/1
50 TABLET, EXTENDED RELEASE ORAL DAILY
Qty: 90 TABLET | Refills: 3 | Status: SHIPPED | OUTPATIENT
Start: 2024-04-09 | End: 2024-06-19 | Stop reason: SDUPTHER

## 2024-04-09 NOTE — PROGRESS NOTES
Problem List Items Addressed This Visit          Neuro    Diabetic gastroparesis    Overview     Chronic hx. Currently well controlled, tolerating po  - work on good dm control (see plan)  - prn reglan  - be sure to keep GI f/u             Cardiac/Vascular    Tachycardia    Overview     Resolved with ivf and bb  Cont bb         Relevant Medications    metoprolol succinate (TOPROL-XL) 50 MG 24 hr tablet       Renal/    Hypomagnesemia    Overview     Mag 1.2 in hospital  Repeat labs  Cont supplement          Relevant Medications    magnesium oxide (MAG-OX) 400 mg (241.3 mg magnesium) tablet       Endocrine    Type 2 diabetes mellitus with hyperglycemia, with long-term current use of insulin    Overview     Recently well controlled   Lab Results   Component Value Date    HGBA1C 6.5 (H) 04/01/2024   BG home readings: 130s  Hypoglycemic Events: none    -current meds:   Diabetes Medications               insulin (LANTUS SOLOSTAR U-100 INSULIN) glargine 100 units/mL SubQ pen Inject 30 Units into the skin every evening.    insulin lispro (HUMALOG KWIKPEN INSULIN) 100 unit/mL pen Inject 2-8 Units into the skin every 4 (four) hours. USING CORRECTION SCALE.        -on statin:   -on ACE-I/ARB:   Hypertension Medications               metoprolol succinate (TOPROL-XL) 50 MG 24 hr tablet Take 1 tablet (50 mg total) by mouth once daily.        -counseling provided on importance of diabetic diet and medication compliance in order to treat diabetes  -discussed diabetes disease course and potential complications  Follow up 3 months               GI    Nausea - Primary    Overview     Chronic, intermittent hx in setting of gastroparesis   Prn phenergan           Relevant Medications    metoclopramide HCl (REGLAN) 5 MG tablet         Follow up in 3 months (on 7/9/2024) for 1st avail pap with me.    Saskia Albert MD  _________________________________________________________________________      Patient ID: Olga Roger is a 38  "y.o. female.    Chief Complaint:  hospital follow up  Admitted last week for dka and dm gastroparesis  Per chart review:  "38-year-old lady with type 2 diabetes on Lantus, has had nausea vomiting with dysphagia since mid December-with weight loss of 25 lb, had upper endoscopy done 2 weeks ago at [facility] and was told to have Candida esophagitis w-was given nystatin swish and swallow to follow-up in 2 weeks as outpatient.  Patient has continued to have nausea with vomiting, soon after she eats with a feeling of food being stuck in her mid chest, over the last 3 days has had persistent nausea with vomiting to keep anything down,  Was brought to [facility] emergency room.  On arrival here patient is tachycardic, afebrile, -blood pressure is stable.  Lab workup, with microcytic anemia, normal white count, potassium 3.3, bicarb of 18 with anion gap of 17, blood sugars 200 range.  ABG with pH of 7.4-serum ketones positive -patient was started on bolus fluids.-hospital Medicine as to evaluate and admit patient.    History obtained from talking patient with her mother by bedside.  Patient was scheduled to get swallow evaluation and gastric emptying studies as outpatient.  Being admitted for hydration and symptom management as observation.        * No surgery found *       Hospital Course:   Patient admitted with gastroparesis with intractable nausea and vomiting, dehydration, starvation ketoacidosis requiring IV fluid hydration and scheduled antiemetics.  She underwent gastric emptying stat study that was consistent with delayed gastric emptying.  Gastroenterology followed.  Patient does not feel well with Reglan, complains of feeling jittery therefore subsequently received Benadryl which makes her sleepy.  She is frustrated about her symptoms.  Discussed with Gastroenterology who recommends considering domperidone as outpatient however that is only something that can be ordered as outpatient. PO intake slowly " "improved, but patient remained tachycardic. She had a near-syncopal episode. Orthostatics were positive and she was given IVF bolus on 4/3 with temporary improvement of her HR.  With rehydration, blood pressure remained persistently elevated as well as her heart rate.  Low-dose of beta-blocker initiated for treatment of hypertension as well as resting tachycardia.  With continued treatment with IV Reglan, symptoms improved and she was able to tolerate a diabetic diet.  She felt ready to discharge home with close outpatient follow-up. "    Since dc, feels tired; however, better than before. Intermittent abd pain with eating, no vomiting. Tolerating PO. Denies fevers, chills, chest pain, SOB, vomiting, dysuria, hematuria, hematochezia, or melena. Nausea relief with reglan.    Last Bm Sunday, soft    Has GI appt with [facility]    Bg 130s - denies hypoglycemic events     Past medical histories reviewed, including past medical, surgical, family and social histories.      Current Outpatient Medications on File Prior to Visit   Medication Sig Dispense Refill    blood sugar diagnostic Strp To check BG 3 times daily, to use with insurance preferred meter 100 each 0    blood-glucose meter kit To check BG 3 times daily, to use with insurance preferred meter 100 each 1    blood-glucose sensor (DEXCOM G7 SENSOR) Hui 1 Device by Misc.(Non-Drug; Combo Route) route every 10 days. 3 each 11    DEXCOM G7  Misc USE  ONCE DAILY 1 each 0    insulin (LANTUS SOLOSTAR U-100 INSULIN) glargine 100 units/mL SubQ pen Inject 30 Units into the skin every evening.      insulin lispro (HUMALOG KWIKPEN INSULIN) 100 unit/mL pen Inject 2-8 Units into the skin every 4 (four) hours. USING CORRECTION SCALE. 14.4 mL 11    lancets Misc To check BG 3 times daily, to use with insurance preferred meter 100 each 1    promethazine (PHENERGAN) 12.5 MG Supp Place 1 suppository (12.5 mg total) rectally every 6 (six) hours as needed. 12 " suppository 1     Current Facility-Administered Medications on File Prior to Visit   Medication Dose Route Frequency Provider Last Rate Last Admin    levonorgestreL (MIRENA) 20 mcg/24 hours (7 yrs) 52 mg IUD 1 Intra Uterine Device  1 Intra Uterine Device Tino Graves, NP   1 Intra Uterine Device at 11/23/21 1400       Review of Systems   12 point review of systems negative except for listed in HPI.     Objective:    Nursing note and vitals reviewed.  Vitals:    04/09/24 0950   BP: 115/79   Pulse: 95   Resp: 18   Temp: 97.4 °F (36.3 °C)     Body mass index is 24.35 kg/m².     Physical Exam   Constitutional: oriented to person, place, and time. well-developed and well-nourished. No distress.   HENT: WNL  Head: Normocephalic and atraumatic.   Eyes: EOM are normal.   Neck: Normal range of motion. Neck supple.   Cardiovascular: Normal rate  Pulmonary/Chest: Effort normal. No respiratory distress.   GI: soft, non distended, no ttp, no rebound/guarding  Musculoskeletal: Normal range of motion. no edema.   Neurological: CN II-XII intact  Skin: warm and dry.   Psychiatric: normal mood and affect. behavior is normal.         We Offer Telehealth & Same Day Appointments!   Book your Telehealth appointment with me through my nurse or   Clinic appointments on [employer]!  [phone]     To Schedule appointments online, go to [employer]: [email or web address]       Transitional Care Note    Family and/or Caretaker present at visit?  Yes.  Diagnostic tests reviewed/disposition: No diagnosic tests pending after this hospitalization.  Disease/illness education: dka  Home health/community services discussion/referrals: Patient does not have home health established from hospital visit.  They do not need home health.  If needed, we will set up home health for the patient.   Establishment or re-establishment of referral orders for community resources: No other necessary community  resources.   Discussion with other health care providers: No discussion with other health care providers necessary.

## 2024-04-09 NOTE — PATIENT INSTRUCTIONS
Sivakumar Espinoza,     If you are due for any health screening(s) below please notify me so we can arrange them to be ordered and scheduled. Most healthy patients at your age complete them, but you are free to accept or refuse.     If you can't do it, I'll definitely understand. If you can, I'd certainly appreciate it!    Tests to Keep You Healthy    Eye Exam: DUE  Cervical Cancer Screening: DUE  Last HbA1c < 8 (04/01/2024): Yes      Your cervical cancer screening is due     Our records indicate that you may be overdue for your screening Pap smear. A Pap smear is an important health screening that can detect abnormal cells that can become cervical cancer. Cervical cancer screenings allow for early diagnosis and increase the likelihood of successful treatment.     The current recommendation for Pap smear screening is every 3-5 years for women at average risk. We encourage you to schedule your appointment with your womens health provider. Many women see a gynecologist for this screening, but some primary care providers also provide Pap screening.     If you recently had your Pap smear screening performed outside of [facility], please let your health care team know so that they can update your health record.      Your diabetic retinal eye exam is due     Diabetes is the #1 cause of blindness in the [place] - early detection before signs or symptoms develop can prevent debilitating blindness.     Our records indicate that you may be overdue for your annual diabetic eye exam. Eye screening can help identify patients at risk for developing vision loss which is common in diabetes. This simple screening is an important step to keeping you healthy and preventing complications from diabetes.     This recommended diabetic eye exam should take place once per year and can prevent and treat diabetes complications in the eye before developing symptoms. This can be done with a special camera is used to take photographs of the back  of your eye without having to dilate them, or you can see an eye doctor for a full dilated exam.     If you recently had your yearly diabetic eye exam performed outside of [facility], please let your Health care team know so that they can update your health record.

## 2024-04-10 PROBLEM — E08.65 DIABETES MELLITUS DUE TO UNDERLYING CONDITION, UNCONTROLLED, WITH HYPERGLYCEMIA: Status: RESOLVED | Noted: 2023-08-25 | Resolved: 2024-04-10

## 2024-04-10 PROBLEM — Z79.4 TYPE 2 DIABETES MELLITUS WITH HYPERGLYCEMIA, WITH LONG-TERM CURRENT USE OF INSULIN: Status: ACTIVE | Noted: 2018-10-31

## 2024-04-27 PROBLEM — E11.9 TYPE 2 DIABETES MELLITUS, WITH LONG-TERM CURRENT USE OF INSULIN: Status: ACTIVE | Noted: 2024-04-27

## 2024-04-27 PROBLEM — E83.52 HYPERCALCEMIA: Status: ACTIVE | Noted: 2024-04-27

## 2024-04-27 PROBLEM — Z79.4 TYPE 2 DIABETES MELLITUS, WITH LONG-TERM CURRENT USE OF INSULIN: Status: RESOLVED | Noted: 2024-04-27 | Resolved: 2024-04-27

## 2024-04-27 PROBLEM — D50.9 MICROCYTIC ANEMIA: Status: ACTIVE | Noted: 2024-04-27

## 2024-04-27 PROBLEM — Z79.4 TYPE 2 DIABETES MELLITUS, WITH LONG-TERM CURRENT USE OF INSULIN: Status: ACTIVE | Noted: 2024-04-27

## 2024-04-27 PROBLEM — R11.2 NAUSEA AND VOMITING: Status: ACTIVE | Noted: 2024-04-27

## 2024-04-27 PROBLEM — R79.89 ELEVATED FERRITIN LEVEL: Status: ACTIVE | Noted: 2024-04-27

## 2024-04-27 PROBLEM — E11.9 TYPE 2 DIABETES MELLITUS, WITH LONG-TERM CURRENT USE OF INSULIN: Status: RESOLVED | Noted: 2024-04-27 | Resolved: 2024-04-27

## 2024-04-27 PROBLEM — E11.9 TYPE 2 DIABETES MELLITUS: Status: ACTIVE | Noted: 2024-04-27

## 2024-04-27 PROBLEM — R10.9 ABDOMINAL PAIN: Status: RESOLVED | Noted: 2024-04-07 | Resolved: 2024-04-27

## 2024-04-28 PROBLEM — R79.89 ELEVATED FERRITIN LEVEL: Status: RESOLVED | Noted: 2024-04-27 | Resolved: 2024-04-28

## 2024-04-28 PROBLEM — D50.9 MICROCYTIC ANEMIA: Status: RESOLVED | Noted: 2024-04-27 | Resolved: 2024-04-28

## 2024-05-02 ENCOUNTER — OFFICE VISIT (OUTPATIENT)
Dept: FAMILY MEDICINE | Facility: CLINIC | Age: 39
End: 2024-05-02
Payer: COMMERCIAL

## 2024-05-02 VITALS
OXYGEN SATURATION: 100 % | SYSTOLIC BLOOD PRESSURE: 122 MMHG | DIASTOLIC BLOOD PRESSURE: 83 MMHG | BODY MASS INDEX: 23.73 KG/M2 | HEART RATE: 100 BPM | HEIGHT: 65 IN | WEIGHT: 142.44 LBS | TEMPERATURE: 98 F | RESPIRATION RATE: 18 BRPM

## 2024-05-02 DIAGNOSIS — Z12.4 PAP SMEAR FOR CERVICAL CANCER SCREENING: Primary | ICD-10-CM

## 2024-05-02 PROCEDURE — 99213 OFFICE O/P EST LOW 20 MIN: CPT | Mod: S$GLB,,, | Performed by: STUDENT IN AN ORGANIZED HEALTH CARE EDUCATION/TRAINING PROGRAM

## 2024-05-02 PROCEDURE — 3044F HG A1C LEVEL LT 7.0%: CPT | Mod: CPTII,S$GLB,, | Performed by: STUDENT IN AN ORGANIZED HEALTH CARE EDUCATION/TRAINING PROGRAM

## 2024-05-02 PROCEDURE — 3074F SYST BP LT 130 MM HG: CPT | Mod: CPTII,S$GLB,, | Performed by: STUDENT IN AN ORGANIZED HEALTH CARE EDUCATION/TRAINING PROGRAM

## 2024-05-02 PROCEDURE — 1159F MED LIST DOCD IN RCRD: CPT | Mod: CPTII,S$GLB,, | Performed by: STUDENT IN AN ORGANIZED HEALTH CARE EDUCATION/TRAINING PROGRAM

## 2024-05-02 PROCEDURE — 1111F DSCHRG MED/CURRENT MED MERGE: CPT | Mod: CPTII,S$GLB,, | Performed by: STUDENT IN AN ORGANIZED HEALTH CARE EDUCATION/TRAINING PROGRAM

## 2024-05-02 PROCEDURE — 81514 NFCT DS BV&VAGINITIS DNA ALG: CPT | Performed by: STUDENT IN AN ORGANIZED HEALTH CARE EDUCATION/TRAINING PROGRAM

## 2024-05-02 PROCEDURE — 3008F BODY MASS INDEX DOCD: CPT | Mod: CPTII,S$GLB,, | Performed by: STUDENT IN AN ORGANIZED HEALTH CARE EDUCATION/TRAINING PROGRAM

## 2024-05-02 PROCEDURE — 1160F RVW MEDS BY RX/DR IN RCRD: CPT | Mod: CPTII,S$GLB,, | Performed by: STUDENT IN AN ORGANIZED HEALTH CARE EDUCATION/TRAINING PROGRAM

## 2024-05-02 PROCEDURE — 3079F DIAST BP 80-89 MM HG: CPT | Mod: CPTII,S$GLB,, | Performed by: STUDENT IN AN ORGANIZED HEALTH CARE EDUCATION/TRAINING PROGRAM

## 2024-05-02 PROCEDURE — 88142 CYTOPATH C/V THIN LAYER: CPT | Performed by: PATHOLOGY

## 2024-05-02 PROCEDURE — 99999 PR PBB SHADOW E&M-EST. PATIENT-LVL IV: CPT | Mod: PBBFAC,,, | Performed by: STUDENT IN AN ORGANIZED HEALTH CARE EDUCATION/TRAINING PROGRAM

## 2024-05-02 PROCEDURE — 87624 HPV HI-RISK TYP POOLED RSLT: CPT | Performed by: STUDENT IN AN ORGANIZED HEALTH CARE EDUCATION/TRAINING PROGRAM

## 2024-05-02 NOTE — PROGRESS NOTES
Problem List Items Addressed This Visit          Renal/    Pap smear for cervical cancer screening - Primary    Relevant Orders    Liquid-Based Pap Smear, Screening    HPV High Risk Genotypes, PCR    Vaginosis Screen by DNA Probe (Completed)     Fmla forms completed for recent, multiple hospital admissions for gastroparesis un setting of t2dm    The patient is here to have pap smear performed. Reports no concerns  and the last physical exam that included a pap smear and a pelvic exam and was approximately couple years ago. Denies dysmenorrhea, dyspareunia, genital ulcers, pelvic pain, and vulvar/vaginal symptoms.      ROS: 12 point review of systems per hpi, otherwise negative       Vitals:    05/02/24 1217   BP: 122/83   Pulse: 100   Resp: 18   Temp: 98.1 °F (36.7 °C)     Physical Exam   Constitutional: oriented to person, place, and time. well-developed and well-nourished. No distress.   HENT: WNL  Head: Normocephalic and atraumatic.   Eyes: EOM are normal.   Neck: Normal range of motion. Neck supple.   Cardiovascular: Normal rate  Pulmonary/Chest: Effort normal. No respiratory distress.   Genitourinary: Normal female hair pattern distribution; no masses or swelling. Urethral meatus intact without erythema or discharge. Vaginal mucosa pink and moist with rugae present,   Uterus anteverted, 3x3 cervix with patent os - whitish discharge, mobile, no cervical motion tenderness, adnexa palpable, no pain   Bilateral breasts: No chest deformity; symmetric. Normal contours. No nodules, masses, tenderness, or axillary lymphadenopathy. No nipple skin changes or discharge.   Musculoskeletal: Normal range of motion. no edema.   Neurological: CN II-XII intact  Skin: warm and dry.   Psychiatric: normal mood and affect. behavior is normal

## 2024-05-02 NOTE — PATIENT INSTRUCTIONS
Sivakumar Espinoza,     If you are due for any health screening(s) below please notify me so we can arrange them to be ordered and scheduled. Most healthy patients at your age complete them, but you are free to accept or refuse.     If you can't do it, I'll definitely understand. If you can, I'd certainly appreciate it!    Tests to Keep You Healthy    Eye Exam: DUE  Cervical Cancer Screening: DUE  Last HbA1c < 8 (04/27/2024): Yes      Your cervical cancer screening is due     Our records indicate that you may be overdue for your screening Pap smear. A Pap smear is an important health screening that can detect abnormal cells that can become cervical cancer. Cervical cancer screenings allow for early diagnosis and increase the likelihood of successful treatment.     The current recommendation for Pap smear screening is every 3-5 years for women at average risk. We encourage you to schedule your appointment with your womens health provider. Many women see a gynecologist for this screening, but some primary care providers also provide Pap screening.     If you recently had your Pap smear screening performed outside of [facility], please let your health care team know so that they can update your health record.      Your diabetic retinal eye exam is due     Diabetes is the #1 cause of blindness in the [place] - early detection before signs or symptoms develop can prevent debilitating blindness.     Our records indicate that you may be overdue for your annual diabetic eye exam. Eye screening can help identify patients at risk for developing vision loss which is common in diabetes. This simple screening is an important step to keeping you healthy and preventing complications from diabetes.     This recommended diabetic eye exam should take place once per year and can prevent and treat diabetes complications in the eye before developing symptoms. This can be done with a special camera is used to take photographs of the back  of your eye without having to dilate them, or you can see an eye doctor for a full dilated exam.     If you recently had your yearly diabetic eye exam performed outside of [facility], please let your Health care team know so that they can update your health record.

## 2024-05-03 DIAGNOSIS — B37.9 YEAST INFECTION: Primary | ICD-10-CM

## 2024-05-03 LAB
BACTERIAL VAGINOSIS DNA: NEGATIVE
CANDIDA GLABRATA DNA: POSITIVE
CANDIDA KRUSEI DNA: NEGATIVE
CANDIDA RRNA VAG QL PROBE: NEGATIVE
T VAGINALIS RRNA GENITAL QL PROBE: NEGATIVE

## 2024-05-03 RX ORDER — FLUCONAZOLE 150 MG/1
TABLET ORAL
Qty: 2 TABLET | Refills: 1 | Status: SHIPPED | OUTPATIENT
Start: 2024-05-03 | End: 2024-06-19

## 2024-05-03 NOTE — PROGRESS NOTES
I have sent a msg to patient with the following interpretation (see below):    + yeast infection, diflucan sent     Please do not hesitate to call or message with any questions or concerns    Saskia Albert MD

## 2024-05-04 PROBLEM — Z12.4 PAP SMEAR FOR CERVICAL CANCER SCREENING: Status: ACTIVE | Noted: 2024-05-04

## 2024-05-06 PROBLEM — N17.9 AKI (ACUTE KIDNEY INJURY): Status: RESOLVED | Noted: 2024-01-30 | Resolved: 2024-05-06

## 2024-05-09 DIAGNOSIS — Z12.4 PAP SMEAR FOR CERVICAL CANCER SCREENING: Primary | ICD-10-CM

## 2024-05-09 LAB
FINAL PATHOLOGIC DIAGNOSIS: NORMAL
HPV HR 12 DNA SPEC QL NAA+PROBE: ABNORMAL
HPV16 AG SPEC QL: ABNORMAL
HPV18 DNA SPEC QL NAA+PROBE: ABNORMAL
Lab: NORMAL

## 2024-05-09 NOTE — PROGRESS NOTES
I have sent a msg to patient with the following interpretation (see below):    Indeterminate result for hpv = unable to say if positive or negative. Likely will repeat pap in 1 yr.  I have placed a referral to obgyn to further evaluate. My team will get this scheduled.        Please do not hesitate to call or message with any questions or concerns    Saskia Albert MD

## 2024-05-16 ENCOUNTER — TELEPHONE (OUTPATIENT)
Dept: GASTROENTEROLOGY | Facility: CLINIC | Age: 39
End: 2024-05-16
Payer: COMMERCIAL

## 2024-05-16 NOTE — TELEPHONE ENCOUNTER
----- Message from Queenie Vu sent at 5/16/2024 12:21 PM CDT -----  Regarding: apt  Contact: [phone]  Pt calling in as new pt to schedule apt for gastroparesis please call to discuss Further

## 2024-05-16 NOTE — TELEPHONE ENCOUNTER
Returned patients call in regards to scheduling an appointment, LM for her to call the office at her convenience.   Patient will need to get a referral from her primary care provider.

## 2024-05-21 ENCOUNTER — TELEPHONE (OUTPATIENT)
Dept: GASTROENTEROLOGY | Facility: CLINIC | Age: 39
End: 2024-05-21
Payer: COMMERCIAL

## 2024-05-21 ENCOUNTER — TELEPHONE (OUTPATIENT)
Dept: HEPATOLOGY | Facility: CLINIC | Age: 39
End: 2024-05-21
Payer: COMMERCIAL

## 2024-05-21 NOTE — TELEPHONE ENCOUNTER
----- Message from Queenie Vu sent at 5/16/2024 12:21 PM CDT -----  Regarding: apt  Contact: [phone]  Pt calling in as new pt to schedule apt for gastroparesis please call to discuss Further

## 2024-05-21 NOTE — TELEPHONE ENCOUNTER
MA spoke with patient. Mrs. Roger is aware she is being placed on a wait list for a motility appointment. Patient verbalized understanding.

## 2024-05-21 NOTE — TELEPHONE ENCOUNTER
----- Message from Adeola Lopez sent at 5/21/2024  9:58 AM CDT -----  Regarding: APPT  Contact: pt@ [phone]  Pt is calling to speak to someone in the office to schedule her appt has a referral in epic no available appts in Epic. Please call to advise. Pt@ [phone] Thanks.     Patient's DX:R11.2 (ICD-10-CM) - Nausea and vomiting, unspecified vomiting type  R11.2 (ICD-10-CM) - Intractable nausea and vomiting  E11.43,K31.84 (ICD-10-CM) - Diabetic gastroparesis associated with type 2 diabetes mellitus        Additional Info:

## 2024-05-29 LAB
LEFT EYE DM RETINOPATHY: POSITIVE
RIGHT EYE DM RETINOPATHY: POSITIVE

## 2024-06-19 ENCOUNTER — OFFICE VISIT (OUTPATIENT)
Dept: FAMILY MEDICINE | Facility: CLINIC | Age: 39
End: 2024-06-19
Payer: COMMERCIAL

## 2024-06-19 ENCOUNTER — LAB VISIT (OUTPATIENT)
Dept: LAB | Facility: HOSPITAL | Age: 39
End: 2024-06-19
Attending: STUDENT IN AN ORGANIZED HEALTH CARE EDUCATION/TRAINING PROGRAM
Payer: COMMERCIAL

## 2024-06-19 VITALS
SYSTOLIC BLOOD PRESSURE: 124 MMHG | HEIGHT: 65 IN | OXYGEN SATURATION: 99 % | HEART RATE: 100 BPM | RESPIRATION RATE: 18 BRPM | TEMPERATURE: 98 F | DIASTOLIC BLOOD PRESSURE: 86 MMHG | WEIGHT: 153.63 LBS | BODY MASS INDEX: 25.6 KG/M2

## 2024-06-19 DIAGNOSIS — K31.84 DIABETIC GASTROPARESIS: ICD-10-CM

## 2024-06-19 DIAGNOSIS — E08.65 DIABETES MELLITUS DUE TO UNDERLYING CONDITION, UNCONTROLLED, WITH HYPERGLYCEMIA: ICD-10-CM

## 2024-06-19 DIAGNOSIS — E11.65 UNCONTROLLED TYPE 2 DIABETES MELLITUS WITH HYPERGLYCEMIA: ICD-10-CM

## 2024-06-19 DIAGNOSIS — E11.43 DIABETIC GASTROPARESIS: ICD-10-CM

## 2024-06-19 DIAGNOSIS — K31.84 DIABETIC GASTROPARESIS: Primary | ICD-10-CM

## 2024-06-19 DIAGNOSIS — E11.65 TYPE 2 DIABETES MELLITUS WITH HYPERGLYCEMIA, WITH LONG-TERM CURRENT USE OF INSULIN: ICD-10-CM

## 2024-06-19 DIAGNOSIS — K31.84 GASTROPARESIS: ICD-10-CM

## 2024-06-19 DIAGNOSIS — Z79.4 TYPE 2 DIABETES MELLITUS WITH HYPERGLYCEMIA, WITH LONG-TERM CURRENT USE OF INSULIN: ICD-10-CM

## 2024-06-19 DIAGNOSIS — R00.0 TACHYCARDIA: ICD-10-CM

## 2024-06-19 DIAGNOSIS — E11.43 DIABETIC GASTROPARESIS: Primary | ICD-10-CM

## 2024-06-19 PROBLEM — I10 HYPERTENSION: Status: ACTIVE | Noted: 2024-05-10

## 2024-06-19 LAB
ALBUMIN SERPL BCP-MCNC: 3.7 G/DL (ref 3.5–5.2)
ALP SERPL-CCNC: 91 U/L (ref 55–135)
ALT SERPL W/O P-5'-P-CCNC: 6 U/L (ref 10–44)
ANION GAP SERPL CALC-SCNC: 9 MMOL/L (ref 8–16)
AST SERPL-CCNC: 13 U/L (ref 10–40)
BILIRUB SERPL-MCNC: 0.2 MG/DL (ref 0.1–1)
BUN SERPL-MCNC: 17 MG/DL (ref 6–20)
CALCIUM SERPL-MCNC: 10.5 MG/DL (ref 8.7–10.5)
CHLORIDE SERPL-SCNC: 107 MMOL/L (ref 95–110)
CO2 SERPL-SCNC: 23 MMOL/L (ref 23–29)
CREAT SERPL-MCNC: 1.3 MG/DL (ref 0.5–1.4)
ERYTHROCYTE [DISTWIDTH] IN BLOOD BY AUTOMATED COUNT: 11.5 % (ref 11.5–14.5)
EST. GFR  (NO RACE VARIABLE): 54.3 ML/MIN/1.73 M^2
ESTIMATED AVG GLUCOSE: 114 MG/DL (ref 68–131)
GLUCOSE SERPL-MCNC: 78 MG/DL (ref 70–110)
HBA1C MFR BLD: 5.6 % (ref 4–5.6)
HCT VFR BLD AUTO: 36.1 % (ref 37–48.5)
HGB BLD-MCNC: 10.7 G/DL (ref 12–16)
MCH RBC QN AUTO: 24 PG (ref 27–31)
MCHC RBC AUTO-ENTMCNC: 29.6 G/DL (ref 32–36)
MCV RBC AUTO: 81 FL (ref 82–98)
PLATELET # BLD AUTO: 440 K/UL (ref 150–450)
PMV BLD AUTO: 10.4 FL (ref 9.2–12.9)
POTASSIUM SERPL-SCNC: 4.1 MMOL/L (ref 3.5–5.1)
PROT SERPL-MCNC: 7.6 G/DL (ref 6–8.4)
RBC # BLD AUTO: 4.46 M/UL (ref 4–5.4)
SODIUM SERPL-SCNC: 139 MMOL/L (ref 136–145)
WBC # BLD AUTO: 6.79 K/UL (ref 3.9–12.7)

## 2024-06-19 PROCEDURE — 3008F BODY MASS INDEX DOCD: CPT | Mod: CPTII,S$GLB,, | Performed by: STUDENT IN AN ORGANIZED HEALTH CARE EDUCATION/TRAINING PROGRAM

## 2024-06-19 PROCEDURE — 36415 COLL VENOUS BLD VENIPUNCTURE: CPT | Mod: PO | Performed by: STUDENT IN AN ORGANIZED HEALTH CARE EDUCATION/TRAINING PROGRAM

## 2024-06-19 PROCEDURE — 3044F HG A1C LEVEL LT 7.0%: CPT | Mod: CPTII,S$GLB,, | Performed by: STUDENT IN AN ORGANIZED HEALTH CARE EDUCATION/TRAINING PROGRAM

## 2024-06-19 PROCEDURE — 99999 PR PBB SHADOW E&M-EST. PATIENT-LVL V: CPT | Mod: PBBFAC,,, | Performed by: STUDENT IN AN ORGANIZED HEALTH CARE EDUCATION/TRAINING PROGRAM

## 2024-06-19 PROCEDURE — 85027 COMPLETE CBC AUTOMATED: CPT | Performed by: STUDENT IN AN ORGANIZED HEALTH CARE EDUCATION/TRAINING PROGRAM

## 2024-06-19 PROCEDURE — 3074F SYST BP LT 130 MM HG: CPT | Mod: CPTII,S$GLB,, | Performed by: STUDENT IN AN ORGANIZED HEALTH CARE EDUCATION/TRAINING PROGRAM

## 2024-06-19 PROCEDURE — 99214 OFFICE O/P EST MOD 30 MIN: CPT | Mod: S$GLB,,, | Performed by: STUDENT IN AN ORGANIZED HEALTH CARE EDUCATION/TRAINING PROGRAM

## 2024-06-19 PROCEDURE — G2211 COMPLEX E/M VISIT ADD ON: HCPCS | Mod: S$GLB,,, | Performed by: STUDENT IN AN ORGANIZED HEALTH CARE EDUCATION/TRAINING PROGRAM

## 2024-06-19 PROCEDURE — 1159F MED LIST DOCD IN RCRD: CPT | Mod: CPTII,S$GLB,, | Performed by: STUDENT IN AN ORGANIZED HEALTH CARE EDUCATION/TRAINING PROGRAM

## 2024-06-19 PROCEDURE — 3079F DIAST BP 80-89 MM HG: CPT | Mod: CPTII,S$GLB,, | Performed by: STUDENT IN AN ORGANIZED HEALTH CARE EDUCATION/TRAINING PROGRAM

## 2024-06-19 PROCEDURE — 1160F RVW MEDS BY RX/DR IN RCRD: CPT | Mod: CPTII,S$GLB,, | Performed by: STUDENT IN AN ORGANIZED HEALTH CARE EDUCATION/TRAINING PROGRAM

## 2024-06-19 PROCEDURE — 80053 COMPREHEN METABOLIC PANEL: CPT | Performed by: STUDENT IN AN ORGANIZED HEALTH CARE EDUCATION/TRAINING PROGRAM

## 2024-06-19 PROCEDURE — 83036 HEMOGLOBIN GLYCOSYLATED A1C: CPT | Performed by: STUDENT IN AN ORGANIZED HEALTH CARE EDUCATION/TRAINING PROGRAM

## 2024-06-19 RX ORDER — PEN NEEDLE, DIABETIC 32GX 5/32"
NEEDLE, DISPOSABLE MISCELLANEOUS
COMMUNITY
Start: 2024-05-04

## 2024-06-19 RX ORDER — METOPROLOL SUCCINATE 50 MG/1
50 TABLET, EXTENDED RELEASE ORAL DAILY
Qty: 90 TABLET | Refills: 3 | Status: SHIPPED | OUTPATIENT
Start: 2024-06-19 | End: 2025-06-19

## 2024-06-19 NOTE — LETTER
June 19, 2024    Olga Roger  [address]  [facility] [id number] [place] [address]  [address] [facility] [place] [place]  [facility] [address]  Phone: [phone]  Fax: [phone] To whom it may concern,      Olga Roger is a patient here at the [facility]. Please allow  to return to work without restrictions starting Monday, June 24, 2024.       If you have any questions or concerns, please don't hesitate to call.    Sincerely,        Saskia Albert MD

## 2024-06-19 NOTE — PROGRESS NOTES
Problem List Items Addressed This Visit          Cardiac/Vascular    Tachycardia    Overview     Resolved with ivf and bb  Cont bb         Relevant Medications    metoprolol succinate (TOPROL-XL) 50 MG 24 hr tablet       Endocrine    Diabetes    Overview     Well controlled   Lab Results   Component Value Date    HGBA1C 6.0 (H) 04/27/2024       Hypoglycemic Events: none     -current meds:   Diabetes Medications               insulin (LANTUS SOLOSTAR U-100 INSULIN) glargine 100 units/mL SubQ pen Inject 30 Units into the skin every evening.    insulin lispro (HUMALOG KWIKPEN INSULIN) 100 unit/mL pen Inject 2-8 Units into the skin every 4 (four) hours. USING CORRECTION SCALE.          -on statin:   -on ACE-I/ARB:   Hypertension Medications               metoprolol succinate (TOPROL-XL) 50 MG 24 hr tablet Take 1 tablet (50 mg total) by mouth once daily.          -counseling provided on importance of diabetic diet and medication compliance in order to treat diabetes  -discussed diabetes disease course and potential complications  Follow up 3 months               GI    Gastroparesis    Overview     Chronic hx. Currently well controlled, tolerating po - work on good dm control (see plan) - prn reglan - be sure to keep GI f/u  She is aware to take Reglan breaks every few weeks for 1-2 weeks.          Other Visit Diagnoses       Diabetic gastroparesis    -  Primary    Relevant Orders    Urinalysis, Reflex to Urine Culture Urine, Clean Catch    CBC Without Differential              Visit today included increased complexity associated with the care of the episodic problem addressed and managing the longitudinal care of the patient due to the serious and/or complex managed problem(s) as per problem list.   Follow up in about 6 months (around 12/19/2024) for cbc, a1c, cmp, uar. rodri bond in amite eye , recheck BP today.    Saskia Albert MD  _________________________________________________________________________      Patient  "ID: Olga Roger is a 38 y.o. female.    Chief Complaint:  follow up    Has GI appt with [facility]    Bg 130-150s - denies hypoglycemic events     Reports intermittent, seldom gastroparesis episodes.  Doing well with Reglan.  She is apprehensive about taking a Reglan break.  We discussed eating small bland meals during Reglan break    Otherwise, patient has been feeling well. No additional concerns. Denies nausea, vomiting, fevers, chills, abdominal pain.  Reports she is ready to return to work at full duty.    Past medical histories reviewed, including past medical, surgical, family and social histories.      Current Outpatient Medications on File Prior to Visit   Medication Sig Dispense Refill    BD SOY 2ND GEN PEN NEEDLE 32 gauge x 5/32" Ndle use as directed      blood sugar diagnostic Strp To check BG 3 times daily, to use with insurance preferred meter 100 each 0    blood-glucose meter kit To check BG 3 times daily, to use with insurance preferred meter 100 each 1    blood-glucose sensor (DEXCOM G7 SENSOR MISC) 3      blood-glucose sensor (DEXCOM G7 SENSOR) Hui 1 Device by Misc.(Non-Drug; Combo Route) route every 10 days. 3 each 11    DEXCOM G7  Misc USE  ONCE DAILY 1 each 0    insulin (LANTUS SOLOSTAR U-100 INSULIN) glargine 100 units/mL SubQ pen Inject 30 Units into the skin every evening.      insulin lispro (HUMALOG KWIKPEN INSULIN) 100 unit/mL pen Inject 2-8 Units into the skin every 4 (four) hours. USING CORRECTION SCALE. 14.4 mL 11    lancets Misc To check BG 3 times daily, to use with insurance preferred meter 100 each 1    magnesium oxide (MAG-OX) 400 mg (241.3 mg magnesium) tablet Take 1 tablet (400 mg total) by mouth once daily. 30 tablet 2    metoclopramide HCl (REGLAN) 10 MG tablet Take 1 tablet (10 mg total) by mouth 3 (three) times daily before meals. 90 tablet 0    [DISCONTINUED] fluconazole (DIFLUCAN) 150 MG Tab Take 1 tab by mouth on day 1; if symptoms persist after 72 " hours, take 2nd tab (Patient not taking: Reported on 6/19/2024) 2 tablet 1    [DISCONTINUED] metoprolol succinate (TOPROL-XL) 50 MG 24 hr tablet Take 1 tablet (50 mg total) by mouth once daily. (Patient not taking: Reported on 6/19/2024) 90 tablet 3    [DISCONTINUED] prochlorperazine (COMPAZINE) 10 MG tablet Take 1 tablet (10 mg total) by mouth 3 (three) times daily as needed. (Patient not taking: Reported on 6/19/2024) 21 tablet 0    [DISCONTINUED] promethazine (PHENERGAN) 12.5 MG Supp Place 1 suppository (12.5 mg total) rectally every 6 (six) hours as needed. (Patient not taking: Reported on 6/19/2024) 12 suppository 1     Current Facility-Administered Medications on File Prior to Visit   Medication Dose Route Frequency Provider Last Rate Last Admin    levonorgestreL (MIRENA) 20 mcg/24 hours (7 yrs) 52 mg IUD 1 Intra Uterine Device  1 Intra Uterine Device Intrauterine  Tino Garcia NP   1 Intra Uterine Device at 11/23/21 1400       Review of Systems   12 point review of systems negative except for listed in HPI.     Objective:    Nursing note and vitals reviewed.  Vitals:    06/19/24 1044   BP: 124/86   Pulse: 100   Resp:    Temp:      Body mass index is 25.56 kg/m².     Physical Exam   Constitutional: oriented to person, place, and time. well-developed and well-nourished. No distress.   HENT: WNL  Head: Normocephalic and atraumatic.   Eyes: EOM are normal.   Neck: Normal range of motion. Neck supple.   Cardiovascular:  Normal heart rate  Pulmonary/Chest: Effort normal. No respiratory distress.   GI: soft, non distended, no ttp, no rebound/guarding  Musculoskeletal: Normal range of motion. no edema.   Neurological: CN II-XII intact  Skin: warm and dry.   Psychiatric: normal mood and affect. behavior is normal.         We Offer Telehealth & Same Day Appointments!   Book your Telehealth appointment with me through my nurse or   Clinic appointments on [facility]t!  [phone]     To Schedule appointments  online, go to MyChart: [email or web address]       .

## 2024-06-20 ENCOUNTER — PATIENT OUTREACH (OUTPATIENT)
Dept: ADMINISTRATIVE | Facility: HOSPITAL | Age: 39
End: 2024-06-20
Payer: COMMERCIAL

## 2024-06-21 DIAGNOSIS — E11.49 TYPE 2 DIABETES MELLITUS WITH OTHER NEUROLOGIC COMPLICATION, WITHOUT LONG-TERM CURRENT USE OF INSULIN: Primary | ICD-10-CM

## 2024-06-21 DIAGNOSIS — D64.9 ANEMIA, UNSPECIFIED TYPE: ICD-10-CM

## 2024-06-21 NOTE — PROGRESS NOTES
3m A1c, bmp, cbc, iron          Dear Ms.Leroyal ANÍBAL Roger       I have reviewed your recent blood work:     - Your complete blood count - anemia. Are your periods heavy?    - Your metabolic panel which shows your electrolytes, glucose, kidney function, and liver function is normal except mildly reduced kidney function.    Please avoid NSAIDs (Motrin, Excedrin, Goody or BC headache powders, uncoated aspirin, Advil, ibuprofen, aleve, naproxen, etc) and ensure adequate hydration with water. We will repeat in 3 months.         Your A1c is at goal (<7) !! This is excellent! Continue medications as prescribed; limiting sugary drinks (juice, soda, sports drinks) and sweets; Also, ensure daily exercise. We will repeat in 3-6 months.      Please do not hesitate to call or message with any additional questions or concerns.  Saskia Albert MD

## 2024-07-31 ENCOUNTER — OFFICE VISIT (OUTPATIENT)
Dept: FAMILY MEDICINE | Facility: CLINIC | Age: 39
End: 2024-07-31
Payer: COMMERCIAL

## 2024-07-31 ENCOUNTER — HOSPITAL ENCOUNTER (OUTPATIENT)
Dept: RADIOLOGY | Facility: HOSPITAL | Age: 39
Discharge: HOME | End: 2024-07-31
Attending: STUDENT IN AN ORGANIZED HEALTH CARE EDUCATION/TRAINING PROGRAM
Payer: COMMERCIAL

## 2024-07-31 VITALS
HEART RATE: 93 BPM | BODY MASS INDEX: 26.44 KG/M2 | RESPIRATION RATE: 18 BRPM | OXYGEN SATURATION: 97 % | HEIGHT: 65 IN | TEMPERATURE: 98 F | DIASTOLIC BLOOD PRESSURE: 87 MMHG | SYSTOLIC BLOOD PRESSURE: 116 MMHG | WEIGHT: 158.69 LBS

## 2024-07-31 DIAGNOSIS — Z79.4 TYPE 2 DIABETES MELLITUS WITH OTHER DIABETIC ARTHROPATHY, WITH LONG-TERM CURRENT USE OF INSULIN: ICD-10-CM

## 2024-07-31 DIAGNOSIS — M25.551 RIGHT HIP PAIN: Primary | ICD-10-CM

## 2024-07-31 DIAGNOSIS — E11.618 TYPE 2 DIABETES MELLITUS WITH OTHER DIABETIC ARTHROPATHY, WITH LONG-TERM CURRENT USE OF INSULIN: ICD-10-CM

## 2024-07-31 DIAGNOSIS — M25.551 RIGHT HIP PAIN: ICD-10-CM

## 2024-07-31 PROCEDURE — 1160F RVW MEDS BY RX/DR IN RCRD: CPT | Mod: CPTII,S$GLB,, | Performed by: STUDENT IN AN ORGANIZED HEALTH CARE EDUCATION/TRAINING PROGRAM

## 2024-07-31 PROCEDURE — 3044F HG A1C LEVEL LT 7.0%: CPT | Mod: CPTII,S$GLB,, | Performed by: STUDENT IN AN ORGANIZED HEALTH CARE EDUCATION/TRAINING PROGRAM

## 2024-07-31 PROCEDURE — 3008F BODY MASS INDEX DOCD: CPT | Mod: CPTII,S$GLB,, | Performed by: STUDENT IN AN ORGANIZED HEALTH CARE EDUCATION/TRAINING PROGRAM

## 2024-07-31 PROCEDURE — 99214 OFFICE O/P EST MOD 30 MIN: CPT | Mod: S$GLB,,, | Performed by: STUDENT IN AN ORGANIZED HEALTH CARE EDUCATION/TRAINING PROGRAM

## 2024-07-31 PROCEDURE — 2023F DILAT RTA XM W/O RTNOPTHY: CPT | Mod: CPTII,S$GLB,, | Performed by: STUDENT IN AN ORGANIZED HEALTH CARE EDUCATION/TRAINING PROGRAM

## 2024-07-31 PROCEDURE — 1159F MED LIST DOCD IN RCRD: CPT | Mod: CPTII,S$GLB,, | Performed by: STUDENT IN AN ORGANIZED HEALTH CARE EDUCATION/TRAINING PROGRAM

## 2024-07-31 PROCEDURE — 73502 X-RAY EXAM HIP UNI 2-3 VIEWS: CPT | Mod: 26,RT,, | Performed by: RADIOLOGY

## 2024-07-31 PROCEDURE — 3079F DIAST BP 80-89 MM HG: CPT | Mod: CPTII,S$GLB,, | Performed by: STUDENT IN AN ORGANIZED HEALTH CARE EDUCATION/TRAINING PROGRAM

## 2024-07-31 PROCEDURE — 3074F SYST BP LT 130 MM HG: CPT | Mod: CPTII,S$GLB,, | Performed by: STUDENT IN AN ORGANIZED HEALTH CARE EDUCATION/TRAINING PROGRAM

## 2024-07-31 PROCEDURE — 99999 PR PBB SHADOW E&M-EST. PATIENT-LVL V: CPT | Mod: PBBFAC,,, | Performed by: STUDENT IN AN ORGANIZED HEALTH CARE EDUCATION/TRAINING PROGRAM

## 2024-07-31 PROCEDURE — 73502 X-RAY EXAM HIP UNI 2-3 VIEWS: CPT | Mod: TC,PO,RT

## 2024-07-31 RX ORDER — LIDOCAINE 50 MG/G
1 PATCH TOPICAL DAILY
Qty: 14 PATCH | Refills: 3 | Status: SHIPPED | OUTPATIENT
Start: 2024-07-31

## 2024-07-31 RX ORDER — METHOCARBAMOL 750 MG/1
750 TABLET, FILM COATED ORAL 4 TIMES DAILY
Qty: 40 TABLET | Refills: 1 | Status: SHIPPED | OUTPATIENT
Start: 2024-07-31

## 2024-07-31 RX ORDER — TRAMADOL HYDROCHLORIDE 50 MG/1
50 TABLET ORAL EVERY 6 HOURS PRN
Qty: 30 TABLET | Refills: 0 | Status: SHIPPED | OUTPATIENT
Start: 2024-07-31 | End: 2024-08-10

## 2024-07-31 NOTE — LETTER
[facility]  [address] [place] [address]  Phone: [phone]  Fax: [phone] July 31, 2024     Patient: Olga Roger   YOB: 1985   Date of Visit: 7/31/2024       To Whom It May Concern:        Olga Roger is a patient here at the [facility]. She was not working due to illness during the following dates: December 23, 2023 through June 24, 2024.    If you have any questions or concerns, please don't hesitate to contact my office.    Sincerely,          Saskia Albert MD    Patient : Souleymane Souza Age: 69 year old Sex: male   MRN: [id number] Encounter Date: 5/31/2019    3110/A    History     Chief Complaint   Patient presents with   • Nausea   • Vomiting   • Abdominal Pain     HPI  5/31/2019  9:33 PM Souleymane Souza is a 69 year old male with a hx of UTI who presents to the ED for evaluation of nausea and upper abd pain that began this afternoon.  He has not been able to tolerate PO secondary to sx since noon today.  He is also c/o chills, mild diarrhea, and mild vomiting.  The pt has no hx of abd infections.  There are no further complaints or modifying factors at this time.    PCP: Quincy Weaver MD    Allergies   Allergen Reactions   • Levaquin RASH   • Vancomycin VOMITING       Current Discharge Medication List      Prior to Admission Medications    Details   ondansetron (ZOFRAN) 4 MG tablet Take 1 tablet by mouth every 8 hours as needed for Nausea.  Qty: 20 tablet, Refills: 0      insulin regular, human, (HUMULIN R, NOVOLIN R) 100 UNIT/ML injectable solution Inject into the skin every morning. Per Sliding scale:  If BS  = 0 (less than 60 or above 300 call MD)  151-200 =2 units  201-250 = 4 units  251-300 = 6 units      polyethylene glycol (GLYCOLAX, MIRALAX) packet Take 17 g by mouth daily.       docusate calcium (SURFAK) 240 MG capsule Take 240 mg by mouth every 24 hours as needed for Constipation. Indications: Constipation      insulin glargine (LANTUS) 100 UNIT/ML injectable solution Inject 10 Units into the skin nightly.  Qty: 10 mL, Refills: 12      lisinopril (ZESTRIL) 5 MG tablet Take 1 tablet by mouth daily.      metoPROLOL tartrate (LOPRESSOR) 50 MG tablet Take 0.5 tablets by mouth 2 times daily.  Qty: 270 tablet, Refills: 0      pantoprazole (PROTONIX) 40 MG tablet Take 1 tablet by mouth 2 times daily before breakfast and at night.      bisacodyl (DULCOLAX) 10 MG suppository Place 10 mg rectally daily as needed for Constipation.      cholecalciferol (VITAMIN D3)  1000 UNITS tablet Take 1,000 Units by mouth daily.      Nutritional Supplements (PROSOURCE) Liquid Take 30 mLs by mouth 2 times daily.      glucagon (GLUCAGEN HYPOKIT) 1 MG injectable solution Inject 1 mg into the muscle as needed.      dextrose (GLUTOSE) 40 % Gel Take 15 g by mouth as needed. Give 1 unit by mouth as needed for hypoglycemia and concious      escitalopram (LEXAPRO) 20 MG tablet Take 1 tablet by mouth nightly.  Qty: 30 tablet, Refills: 0      tamsulosin (FLOMAX) 0.4 MG Cap Take 1 capsule by mouth daily after a meal.  Qty: 30 capsule, Refills: 0      calcium carbonate (TUMS) 500 MG chewable tablet Chew 1 tablet by mouth as needed for Heartburn.      ferrous sulfate 325 (65 FE) MG tablet Take 325 mg by mouth daily (with breakfast).      ACCU-CHEK SAUL PLUS test strip Test blood sugar 1 to 2  times daily as directed  Qty: 200 strip, Refills: 11      Multiple Vitamins-Minerals (CENTRUM PO) Take 1 tablet by mouth daily.             Past Medical History:   Diagnosis Date   • Chronic kidney disease     kidney stones   • Chronic LBP    • Chronic pain    • Difficulty walking    • DJD (degenerative joint disease)     shoulders   • Essential (primary) hypertension    • Hyperlipidemia    • Kidney failure, acute (CMS/[facility]) 7-2014    acute renal failure secondary to postobstructive uropathy   • Obesity    • Otitis media    • Overactive bladder    • Pneumonia    • Type II or unspecified type diabetes mellitus without mention of complication, not stated as uncontrolled    • Urinary incontinence    • Urinary tract infection    • Urosepsis     hosptialization 5-2014   • Venous ulcer (CMS/[facility])        Past Surgical History:   Procedure Laterality Date   • EXTRACORPOREAL SHOCK WAVE LITHOTRIPSY     • JOINT REPLACEMENT     • REVISE TOTAL HIP REPLACEMENT  07/22/2010    Left, revised twice   • TOTAL HIP REPLACEMENT      bilateral   • URETEROSCOPY      left with JJ stent insertion 5-21-14, left ureteroscopy 6-30-14        Family History   Problem Relation Age of Onset   • Kidney disease Mother    • Arthritis Mother    • Arthritis Father    • Diabetes Father        Social History     Tobacco Use   • Smoking status: Former Smoker     Types: Cigarettes     Last attempt to quit: 4/3/1978     Years since quittin.1   • Smokeless tobacco: Never Used   Substance Use Topics   • Alcohol use: No     Alcohol/week: 0.0 oz   • Drug use: No       Review of Systems   Constitutional: Positive for chills. Negative for fatigue and fever.        Positive for inability to tolerate PO secondary to sx   HENT: Negative.    Respiratory: Negative for chest tightness and shortness of breath.    Cardiovascular: Negative for chest pain.   Gastrointestinal: Positive for abdominal pain (upper), diarrhea (mild), nausea and vomiting (mild). Negative for abdominal distention.   Genitourinary: Negative.    Neurological: Negative for dizziness, syncope, weakness and numbness.   All other systems reviewed and are negative.      Physical Exam     ED Triage Vitals [19 2101]   ED Triage Vitals Group      Temp 98.7 °F (37.1 °C)      Pulse 84      Resp 16      /62      SpO2 96 %      EtCO2 mmHg       Height 5' 8\" (1.727 m)      Weight 221 lb 9 oz (100.5 kg)      Weight Scale Used ED Actual       Physical Exam   Constitutional: He is oriented to person, place, and time. He appears well-developed and well-nourished. No distress.   HENT:   Head: Atraumatic.   Mouth/Throat: Mucous membranes are dry.   Eyes: Pupils are equal, round, and reactive to light.   Neck: Normal range of motion.   Cardiovascular: Normal rate.   Pulmonary/Chest: Effort normal. No respiratory distress.   Abdominal: Soft. There is tenderness (mild, diffuse). There is no rebound and no guarding.   Genitourinary:   Genitourinary Comments: Fernández catheter present   Musculoskeletal: Normal range of motion.   Neurological: He is alert and oriented to person, place, and time.   Skin: Skin is  warm and dry.   Nursing note and vitals reviewed.      ED Course     Procedures    Lab Results     Results for orders placed or performed during the hospital encounter of 05/31/19   CBC & Auto Differential   Result Value Ref Range    WBC 9.1 4.2 - 11.0 K/mcL    RBC 3.66 (L) 4.50 - 5.90 mil/mcL    HGB 11.2 (L) 13.0 - 17.0 g/dL    HCT 33.0 (L) 39.0 - 51.0 %    MCV 90.2 78.0 - 100.0 fl    MCH 30.6 26.0 - 34.0 pg    MCHC 33.9 32.0 - 36.5 g/dL    RDW-CV 14.1 11.0 - 15.0 %     140 - 450 K/mcL    NRBC 0 0 /100 WBC    DIFF TYPE AUTOMATED DIFFERENTIAL     Neutrophil 87 %    LYMPH 7 %    MONO 5 %    EOSIN 0 %    BASO 0 %    Percent Immature Granuloctyes 1 %    Absolute Neutrophil 7.9 (H) 1.8 - 7.7 K/mcL    Absolute Lymph 0.7 (L) 1.0 - 4.0 K/mcL    Absolute Mono 0.4 0.3 - 0.9 K/mcL    Absolute Eos 0.0 (L) 0.1 - 0.5 K/mcL    Absolute Baso 0.0 0.0 - 0.3 K/mcL    Absolute Immature Granulocytes 0.1 0 - 0.2 K/mcl   Comprehensive Metabolic Panel   Result Value Ref Range    Sodium 138 135 - 145 mmol/L    Potassium 3.9 3.4 - 5.1 mmol/L    Chloride 101 98 - 107 mmol/L    Carbon Dioxide 19 (L) 21 - 32 mmol/L    Anion Gap 22 (H) 10 - 20 mmol/L    Glucose 250 (H) 65 - 99 mg/dL    BUN 26 (H) 6 - 20 mg/dL    Creatinine 0.83 0.67 - 1.17 mg/dL    GFR Estimate,  >90     GFR Estimate, Non African American 90     BUN/Creatinine Ratio 31 (H) 7 - 25    CALCIUM 9.7 8.4 - 10.2 mg/dL    TOTAL BILIRUBIN 0.7 0.2 - 1.0 mg/dL    AST/SGOT 12 <38 Units/L    ALT/SGPT 16 <64 Units/L    ALK PHOSPHATASE 117 45 - 117 Units/L    TOTAL PROTEIN 7.7 6.4 - 8.2 g/dL    Albumin 3.3 (L) 3.6 - 5.1 g/dL    GLOBULIN 4.4 (H) 2.0 - 4.0 g/dL    A/G Ratio, Serum 0.8 (L) 1.0 - 2.4   Lipase Level   Result Value Ref Range    Lipase 108 73 - 393 Units/L   Urinalysis & Reflex Micro with Culture if Indicated   Result Value Ref Range    COLOR YELLOW YELLOW    APPEARANCE CLOUDY     GLUCOSE(URINE) TRACE (A) NEGATIVE mg/dL    BILIRUBIN NEGATIVE NEGATIVE     KETONES 20 (A) NEGATIVE mg/dL    SPECIFIC GRAVITY 1.018 1.005 - 1.030    BLOOD MODERATE (A) NEGATIVE    pH 6.0 5.0 - 7.0 Units    PROTEIN(URINE) 30 (A) NEGATIVE mg/dL    UROBILINOGEN 1.0 0.0 - 1.0 mg/dL    NITRITE NEGATIVE NEGATIVE    LEUKOCYTE ESTERASE LARGE (A) NEGATIVE    SPECIMEN TYPE URINE, CLEAN CATCH/MIDSTREAM     Squamous EPI'S 1 to 5 0 - 5 /hpf    RBC 11 to 25 0 - 2 /hpf    WBC >100 0 - 5 /hpf    BACTERIA MODERATE (A) NONE SEEN /hpf    Hyaline Casts NONE SEEN 0 - 5 /lpf    MUCOUS PRESENT     Amorphous Material PRESENT    Magnesium Level   Result Value Ref Range    MAGNESIUM 1.6 (L) 1.7 - 2.4 mg/dL   Chem 8 Panel - Point of Care   Result Value Ref Range    Sodium  135 - 145 mmol/L    Potassium POC 3.9 3.4 - 5.1 mmol/L    Chloride  98 - 107 mmol/L    CALCIUM IONIZED-POC 1.13 (L) 1.15 - 1.29 mmol/L    CO2 Total 16 (L) 19 - 24 mmol/L    GLUCOSE  (H) 65 - 99 mg/dL    BUN POC 24 (H) 6 - 20 mg/dL    HEMATOCRIT POC 32.0 (L) 39.0 - 51.0 %    Hemoglobin POC 10.9 (L) 13.0 - 17.0 g/dL    ANION GAP POC 22 mmol/L    Creatinine POC 0.80 0.67 - 1.17 mg/dL    Estimated GFR  (POC) >90     Estimated GFR Non- (POC) >90    Troponin I - Point of Care   Result Value Ref Range    Troponin I POC <0.10 <0.10 ng/mL       EKG Results     EKG Interpretation  Rate: 90  Rhythm: normal sinus rhythm   Abnormality: nonspecific ST abnormality, prolonged QT    When compared with EKG taken 4/11/19, sinus rhythm has replaced a-fib    EKG interpreted by ED physician    ==========================================    Repeat EKG Interpretation (taken 2:54 AM, 6/1/19)  Rate: 109  Rhythm: sinus tachycardia   Abnormality: nonspecific ST abnormality, prolonged QT    When compared with EKG taken 5/31/19, there is no significant change    EKG interpreted by ED physician    Radiology Results     Imaging Results          CT Abdomen Pelvis w/ IV contrast only (Final result)  Result time 05/31/19 22:46:48     Final result                 Impression:    IMPRESSION:    No acute abdominal or pelvic abnormality.    Renal cortical atrophy and small bilateral calculi.               Narrative:    Comparison 04/09/2019       EXAM: CT ABDOMEN PELVIS W CONTRAST    CLINICAL INDICATION: ABDOMINAL PAIN    TECHNIQUE: Multiple transaxial images of the abdomen and pelvis were  obtained after administration of 100 mL Isovue-300 intravenous contrast  material. The pelvis is partly obscured due to artifact from hip  prostheses.    FINDINGS:     Lung bases: Lung bases are clear.    Liver: Normal appearance.    Gallbladder:  Normal appearance. There is no evidence of biliary  obstruction.    Spleen:  Normal appearance.    Pancreas:  Normal appearance.    Adrenal glands:  Normal appearance.    Kidneys:  There is moderate left and milder right cortical atrophy and  scarring. There are a few small bilateral calculi.    Urinary Bladder: Contains a catheter and is empty.    Prostate: Unremarkable.    There is no acute abnormality of the bowel. There is a normal appendix.    There is no lymphadenopathy or other abnormal mass or fluid collection.    There is no aortic aneurysm.    There is moderate spondylosis.                                    ED Medication Orders (From admission, onward)    Start Ordered     Status Ordering Provider    06/01/19 0105 06/01/19 0104  haloperidol (HALDOL) 5 MG/ML injection 1 mg  ONCE      Last MAR action:  Given ALBERT CADENA    06/01/19 0022 06/01/19 0021  dicyclomine (BENTYL) capsule 20 mg  ONCE      Last MAR action:  Given ALBERT CADENA    05/31/19 2318 05/31/19 2317  HYDROmorphone (DILAUDID) injection 0.5 mg  ONCE      Last MAR action:  Given ALBERT CADENA    05/31/19 2255 05/31/19 2254  metoCLOPramide (REGLAN) injection 10 mg  ONCE      Last MAR action:  Given ALBERT CADENA    05/31/19 2255 05/31/19 2254  diphenhydrAMINE (BENADRYL) injection 12.5 mg  ONCE      Last MAR action:  Given  VIRGINIA CADENALUCA    05/31/19 2255 05/31/19 2254  sodium chloride (NORMAL SALINE) 0.9 % bolus 500 mL  ONCE      Last MAR action:  Completed VIRGINIA CADENALUCA    05/31/19 2137 05/31/19 2136  ketorolac injection 15 mg  ONCE      Last MAR action:  Given VIRGINIA CADENALUAC    05/31/19 2106 05/31/19 2105  ondansetron (ZOFRAN) injection 4 mg  ONCE      Last MAR action:  Given VIRGINIA CADENALUCA    05/31/19 2105 05/31/19 2105  sodium chloride (NORMAL SALINE) 0.9 % bolus 1,000 mL  ONCE      Last MAR action:  Completed YOLANDA CADENANAHUM               [facility]  Vitals  Vitals:    06/01/19 0230 06/01/19 0245 06/01/19 0300 06/01/19 0443   BP: 171/81 (!) 185/92 (!) 182/92 185/88   Pulse: 112 113 109 104   Resp: 18 20 20 20   Temp:    99 °F (37.2 °C)   TempSrc:    Oral   SpO2: 99% 99% 99% 98%   Weight:    98.3 kg   Height:    5' 8\" (1.727 m)       ED Course    Initial Impression:  The pt presents with upper abd pain and nausea since this afternoon.  I informed the pt that the current plan of care will include further labs and CT abd to evaluate for acute abd pathology.  He will also have a UA and cardiac diagnostics run.  In the interim, the pt will receive IV fluids and Toradol for his comfort.  The patient understands and agrees with the current plan of care.  All questions were addressed.      11:16 PM  I rechecked the pt, who has had no further episodes of vomiting since Reglan dosage.  I stated that his labs are negative for evidence of acute pathology like infection.  CT abd is similarly unremarkable.  I will therefore further the plan with a UA.  The pt says he can pass a sample through his catheter.  He will receive further pain medication in the interim.  The patient understands and agrees with the current plan of care.  All questions were addressed.      1:04 AM  The RN informed me that the pt vomited quite heavily after taking half a barbara cracker PO.  I will dose Haldol.    2:18 AM  I rechecked the pt, who is still c/o  abd pain and nausea.  I stated the plan for another PO challenge.  If he passes, he can end care.  The patient understands and agrees with the current plan of care.  All questions were addressed.      2:40 AM I spoke with Dr. Katy Mckinney, Hospitalist, regarding the patient's presentation of N/V.  We also discussed the ED work up noting that there is no evidence of acute pathology per labs, CT, etc.  There was some evidence of infection per UA (though the pt has a catheter and may be chronically infected).  Thus, the source of his nausea is unknown.  I have unsuccessfully tried multiple anti-nausea medications.  Dr. Mckinney agrees with the plan and will assume care of the pt.  We agreed to hold abx dosage for now.    3:03 AM  Pt's QTc is 525 ms.    MDM  Presents with N/V/D. Labs and CT showed no acute findings. Magnesium repleted. Unable to tolerate PO challenge while in the ED after anti-emetics. Plan for admission. Discussed UA with Hospitalist, agree likely colonization, and will hold abx given no fever or urinary symptoms.     Imaging Records for the Patient were reviewed.   Patient's Records indicate that the patient did have a CT Scan of the Abdomen in the last 6 months.     Critical Care time spent on this patient outside of billable procedures:  None    2:42 AM Does this patient meet Severe Sepsis criteria by CMS SEP-1 definition? No     2:42 AM Does this patient meet Septic Shock criteria by CMS SEP-1 definition? No      Clinical Impression:  ED Diagnoses        Final diagnoses    Abdominal pain of unknown etiology          Nausea and vomiting in adult                    Pt to be admitted to Dr. Mckinney in stable condition.       I have reviewed the information recorded by the scribe for accuracy and agree with its contents.    ____________________________________________________________________    Max Allen acting as a scribe for Dr. Juan Ziegler  Dictation #  [id number]  Scribe: Kellie Ziegler MD  06/01/19 0703

## 2024-07-31 NOTE — PATIENT INSTRUCTIONS
Sivakumar Espinoza,     If you are due for any health screening(s) below please notify me so we can arrange them to be ordered and scheduled. Most healthy patients at your age complete them, but you are free to accept or refuse.     If you can't do it, I'll definitely understand. If you can, I'd certainly appreciate it!    All of your core healthy metrics are met.

## 2024-07-31 NOTE — PROGRESS NOTES
Problem List Items Addressed This Visit          Endocrine    Diabetes    Overview     Well controlled   Lab Results   Component Value Date    HGBA1C 5.6 06/19/2024       Hypoglycemic Events: none     -current meds:   Diabetes Medications               insulin (LANTUS SOLOSTAR U-100 INSULIN) glargine 100 units/mL SubQ pen Inject 30 Units into the skin every evening.    insulin lispro (HUMALOG KWIKPEN INSULIN) 100 unit/mL pen Inject 2-8 Units into the skin every 4 (four) hours. USING CORRECTION SCALE.          -on statin:   -on ACE-I/ARB:   Hypertension Medications               metoprolol succinate (TOPROL-XL) 50 MG 24 hr tablet Take 1 tablet (50 mg total) by mouth once daily.          -counseling provided on importance of diabetic diet and medication compliance in order to treat diabetes  -discussed diabetes disease course and potential complications  Follow up 3 months               Orthopedic    Right hip pain - Primary    Overview     New onset; no red flag sx    - will send for XR right hip  - referral to Physical Therapy   - prn tylenol, heat/ice therapy, stretches   Prn rtc            Relevant Medications    traMADoL (ULTRAM) 50 mg tablet    methocarbamoL (ROBAXIN) 750 MG Tab    LIDOcaine (LIDODERM) 5 %    Other Relevant Orders    X-Ray Hip 2 or 3 views Right with Pelvis when performed (Completed)    Ambulatory referral/consult to Orthopedics    Ambulatory referral/consult to Physical/Occupational Therapy           Follow up if symptoms worsen or fail to improve.    Saskia Albert MD  _________________________________________________________________________      Patient ID: Olga Roger is a 38 y.o. female.    Chief Complaint:  r hip pain    Pain started 2 weeks ago. No known to hip; however, s/p I&D with wound vac to R inner thigh (no pain, drainage, swelling redness).    Worse with lying on r side. Some relief with arthritis tylenol.     Past medical histories reviewed, including past medical, surgical,  "family and social histories.      Current Outpatient Medications on File Prior to Visit   Medication Sig Dispense Refill    BD SOY 2ND GEN PEN NEEDLE 32 gauge x 5/32" Ndle use as directed      blood sugar diagnostic Strp To check BG 3 times daily, to use with insurance preferred meter 100 each 0    blood-glucose meter kit To check BG 3 times daily, to use with insurance preferred meter 100 each 1    blood-glucose sensor (DEXCOM G7 SENSOR MISC) 3      blood-glucose sensor (DEXCOM G7 SENSOR) Hui 1 Device by Misc.(Non-Drug; Combo Route) route every 10 days. 3 each 11    DEXCOM G7  Misc USE  ONCE DAILY 1 each 0    insulin (LANTUS SOLOSTAR U-100 INSULIN) glargine 100 units/mL SubQ pen Inject 30 Units into the skin every evening.      insulin lispro (HUMALOG KWIKPEN INSULIN) 100 unit/mL pen Inject 2-8 Units into the skin every 4 (four) hours. USING CORRECTION SCALE. 14.4 mL 11    lancets Misc To check BG 3 times daily, to use with insurance preferred meter 100 each 1    magnesium oxide (MAG-OX) 400 mg (241.3 mg magnesium) tablet Take 1 tablet (400 mg total) by mouth once daily. 30 tablet 2    metoclopramide HCl (REGLAN) 10 MG tablet Take 1 tablet (10 mg total) by mouth 3 (three) times daily before meals. 90 tablet 0    metoprolol succinate (TOPROL-XL) 50 MG 24 hr tablet Take 1 tablet (50 mg total) by mouth once daily. 90 tablet 3     Current Facility-Administered Medications on File Prior to Visit   Medication Dose Route Frequency Provider Last Rate Last Admin    levonorgestreL (MIRENA) 20 mcg/24 hours (7 yrs) 52 mg IUD 1 Intra Uterine Device  1 Intra Uterine Device Intrauterine  Tino Garcia, NP   1 Intra Uterine Device at 11/23/21 1400       Review of Systems   12 point review of systems negative except for listed in HPI.     Objective:    Nursing note and vitals reviewed.  Vitals:    07/31/24 1107   BP: 116/87   Pulse: 93   Resp: 18   Temp: 98.2 °F (36.8 °C)     Body mass index is 26.41 kg/m². "     Physical Exam   Constitutional: oriented to person, place, and time. well-developed and well-nourished. No distress.   HENT: WNL  Head: Normocephalic and atraumatic.   Eyes: EOM are normal.   Neck: Normal range of motion. Neck supple.   Cardiovascular: Normal rate  Pulmonary/Chest: Effort normal. No respiratory distress.   GI: soft, non distended, no ttp, no rebound/guarding  Musculoskeletal: Normal range of motion. no edema.  Positive tender to palpation at right greater trochanter.  Negative FADER, FABIR.  Normal gait/ no edema, erythema, or ecchymosis   + femoral pulse  Neurological: CN II-XII intact  Skin: warm and dry.   Psychiatric: normal mood and affect. behavior is normal.           We Offer Telehealth & Same Day Appointments!   Book your Telehealth appointment with me through my nurse or   Clinic appointments on [employer]!  Oasnov-[phone]     To Schedule appointments online, go to [employer]: [email or web address]

## 2024-08-01 DIAGNOSIS — M25.551 RIGHT HIP PAIN: Primary | ICD-10-CM

## 2024-08-01 NOTE — PROGRESS NOTES
6m hip Xr    I have sent a msg to patient with the following interpretation (see below):    X-ray right hip -  1.3 cm focus of bone (dense bone). Given patient age, this lesion is most likely benign although follow-up plain film of the pelvis and hips should be considered in the next 6 months to evaluate this finding for stability.    Repeat XR 6m. My team will get this scheduled.    Continue treatment plan as discussed in clinic.       Please do not hesitate to call or message with any questions or concerns    Saskia Albert MD

## 2024-08-02 ENCOUNTER — OFFICE VISIT (OUTPATIENT)
Dept: ORTHOPEDICS | Facility: CLINIC | Age: 39
End: 2024-08-02
Payer: COMMERCIAL

## 2024-08-02 VITALS — WEIGHT: 158 LBS | BODY MASS INDEX: 26.33 KG/M2 | HEIGHT: 65 IN

## 2024-08-02 DIAGNOSIS — M25.559 GREATER TROCHANTERIC PAIN SYNDROME: Primary | ICD-10-CM

## 2024-08-02 DIAGNOSIS — M25.551 RIGHT HIP PAIN: ICD-10-CM

## 2024-08-02 PROCEDURE — 99999 PR PBB SHADOW E&M-EST. PATIENT-LVL V: CPT | Mod: PBBFAC,,, | Performed by: STUDENT IN AN ORGANIZED HEALTH CARE EDUCATION/TRAINING PROGRAM

## 2024-08-02 NOTE — PATIENT INSTRUCTIONS
Assessment:  Olga Roger is a 38 y.o. female   Chief Complaint   Patient presents with    Right Hip - Pain       Encounter Diagnosis   Name Primary?    Right hip pain         Plan:  Patient may ice every 2 hours for 15 minutes as needed to control pain and swelling.   Apply topical diclofenac (Voltaren) up to 4 times a day to the affected area.  It can be bought over the counter at any local pharmacy.    Patient provided information on TenJet procedure.  Follow up as needed          Here is some general information on TenJet procedure. You may also watch the video on this link to see an overview of how the procedure goes as well.   TenJet Video                       Follow-up: as needed.    Thank you for choosing [facility] and Dr. Brent Talamantes for your orthopedic & sports medicine care. It is our goal to provide you with exceptional care that will help keep you healthy, active, and get you back in the game.    Please do not hesitate to reach out to us via email, phone, or MyChart with any questions, concerns, or feedback.    If you felt that you received exemplary care today, please consider leaving us feedback on [employer] at:  [email or web address]    If you are experiencing pain/discomfort ,or have questions after 5pm and would like to be connected to the [facility] on-call team, please call this number and specify which Sports Medicine provider is treating you: [phone]

## 2024-08-02 NOTE — PROGRESS NOTES
Patient ID: Olga Roger  YOB: 1985  MRN: [id number]    Chief Complaint: Pain of the Right Hip      Referred By: Saskia Albert MD for Right Hip Pain    History of Present Illness: Olga Roger is a right-hand dominant 38 y.o. female who presents today with right hip pain.  Reports that the pain has been present approximately 2 weeks.  Has history of having a cut present on the inner leg and a wound vac for this. States that her pain is located on the lateral aspect of the right hip and is mainly painful at night, when sleeping.  Rates the pain at a 7/10 and describes as a stabbing, sharp pain.  Has tried heat, Tylenol Arthritis, and lidocaine patches.  Has prescription from her PCP for Tramadol and Methocarbomol.  Reports that she tried the muscle relaxer on Wednesday and the Tramadol last night and that the Tramadol allowed her to get some sleep through the night.      The patient is active in none.  Occupation:       Past Medical History:   Past Medical History:   Diagnosis Date    Diabetes mellitus type 2 in nonobese 10/31/2018    A1c >14% as of 11/1/19; Stopped metformin 2/2 diarrhea -> Lantus & atorvastatin on 11/1/19    Hypertriglyceridemia 11/01/2019    TG 1,261 mg/dL, for which we will consider fenofibrate if atorvastatin doesn't causes significant transaminitis (will also consider Lovaza)    Overweight (BMI 25.0-29.9)      History reviewed. No pertinent surgical history.  Family History   Problem Relation Name Age of Onset    Diabetes Father Manuel Roger     Blindness Father Manuel Roger     No Known Problems Mother      Breast cancer Maternal Grandmother      Breast cancer Maternal Aunt Diatrise Self 45    Cancer Maternal Aunt Diatrise Self         Breast    Depression Brother Corky Walters      Social History     Socioeconomic History    Marital status: Single   Tobacco Use    Smoking status: Never    Smokeless tobacco: Never   Substance and Sexual  Activity    Alcohol use: Not Currently     Alcohol/week: 6.0 standard drinks of alcohol     Types: 4 Glasses of wine, 2 Shots of liquor per week    Drug use: Never    Sexual activity: Yes     Partners: Male     Birth control/protection: Condom, I.U.D., None   Social History Narrative    Patient goal(s): Weight 150 lb by 4/30/2020    Motivation: Wants to be more active with her 3 year old and cure dm2 and be around long-term for him     Breakfast: 10%: croissant, egg & cheese or egg & cheese wrap; Powerade    Lunch: Pork chops, gravy & peas; water    Dinner: Fennel cake; lemonade (previously Cokes: 5 cans/d)    Snacks: None    Eating out: 3x/wk    Water (oz/day): 84 oz/d    Physical Activity: walks 1 hr/d at work     Social Determinants of Health     Financial Resource Strain: Medium Risk (5/13/2024)    Received from [facility]    Overall Financial Resource Strain (CARDIA)     Difficulty of Paying Living Expenses: Somewhat hard   Food Insecurity: No Food Insecurity (5/13/2024)    Received from [facility]    Hunger Vital Sign     Worried About Running Out of Food in the Last Year: Never true     Ran Out of Food in the Last Year: Never true   Transportation Needs: No Transportation Needs (5/13/2024)    Received from [facility]    PRAPARE - Transportation     Lack of Transportation (Medical): No     Lack of Transportation (Non-Medical): No   Stress: Stress Concern Present (5/13/2024)    Received from [facility]    [employer] - Occupational Stress Questionnaire     Feeling of Stress : Rather much   Housing Stability: High Risk (5/13/2024)    Received from [facility]    Housing Stability Vital Sign     Unable to Pay for Housing in the Last Year: Yes     Homeless in the Last Year: Yes     Medication List with Changes/Refills   Current Medications    BD SOY 2ND GEN PEN  "NEEDLE 32 GAUGE X 5/32" NDLE    use as directed    BLOOD SUGAR DIAGNOSTIC STRP    To check BG 3 times daily, to use with insurance preferred meter    BLOOD-GLUCOSE METER KIT    To check BG 3 times daily, to use with insurance preferred meter    BLOOD-GLUCOSE SENSOR (DEXCOM G7 SENSOR MISC)    3    BLOOD-GLUCOSE SENSOR (DEXCOM G7 SENSOR) PAUL    1 Device by Misc.(Non-Drug; Combo Route) route every 10 days.    DEXCOM G7  MISC    USE  ONCE DAILY    INSULIN (LANTUS SOLOSTAR U-100 INSULIN) GLARGINE 100 UNITS/ML SUBQ PEN    Inject 30 Units into the skin every evening.    INSULIN LISPRO (HUMALOG KWIKPEN INSULIN) 100 UNIT/ML PEN    Inject 2-8 Units into the skin every 4 (four) hours. USING CORRECTION SCALE.    LANCETS MISC    To check BG 3 times daily, to use with insurance preferred meter    LIDOCAINE (LIDODERM) 5 %    Place 1 patch onto the skin once daily. Remove & Discard patch within 12 hours or as directed by MD    MAGNESIUM OXIDE (MAG-OX) 400 MG (241.3 MG MAGNESIUM) TABLET    Take 1 tablet (400 mg total) by mouth once daily.    METHOCARBAMOL (ROBAXIN) 750 MG TAB    Take 1 tablet (750 mg total) by mouth 4 (four) times daily.    METOCLOPRAMIDE HCL (REGLAN) 10 MG TABLET    Take 1 tablet (10 mg total) by mouth 3 (three) times daily before meals.    METOPROLOL SUCCINATE (TOPROL-XL) 50 MG 24 HR TABLET    Take 1 tablet (50 mg total) by mouth once daily.    TRAMADOL (ULTRAM) 50 MG TABLET    Take 1 tablet (50 mg total) by mouth every 6 (six) hours as needed for Pain.     Review of patient's allergies indicates:   Allergen Reactions    Temple Hives       Physical Exam:   Body mass index is 26.29 kg/m².    GENERAL: Well appearing, in no acute distress.  HEAD: Normocephalic and atraumatic.  ENT: External ears and nose grossly normal.  EYES: EOMI bilaterally  PULMONARY: Respirations are grossly even and non-labored.  NEURO: Awake, alert, and oriented x 3.  SKIN: No obvious rashes appreciated.  PSYCH: Mood & affect are " appropriate.    Detailed MSK exam:     Right hip exam:  -ROM: internal rotation 30, external rotation 60  -ROSETTE negative, FADIR negative, Main negative  -Muscle strength 5/5 flexion, 5/5 extension, 5/5 abduction, 5/5 adduction  -negative log roll  -negative straight leg raise  -TTP: greater trochanter        Imaging:  X-Ray Hip 2 or 3 views Right with Pelvis when performed  Narrative: EXAM: XR HIP WITH PELVIS WHEN PERFORMED 2 OR 3 VIEWS RIGHT    CLINICAL HISTORY: Pain    FINDINGS:  A frontal view of the pelvis including both hips and additional frontal and frog-leg views of the right hip were performed.    No fracture or dislocation is identified.  There is an indeterminate 1.3 cm focus of bone sclerosis identified laterally in the right iliac wing.  No prior imaging is available to evaluate this finding for chronicity.  The hip joints appear normal and symmetrical.  An IUD is incidentally visible projecting in the mid pelvis.  Impression: 1.  No evidence of osseous injury.  2.  No radiographic abnormalities are identified at the right hip.  3.  1.3 cm focus of bone sclerosis in the right iliac wing.  Given patient age, this lesion is most likely benign although follow-up plain film of the pelvis and hips should be considered in the next 6 months to evaluate this finding for stability.    Finalized on: 7/31/2024 11:51 AM By:  Hal Lozada  [facility]# [id number]      2024-07-31 11:53:[id number]    [facility]        Relevant imaging results were reviewed and interpreted by me and per my read shows no acute abnormalities, 1.3 cm bone sclerosis right iliac wing.  This was discussed with the patient and / or family today.     Assessment:  Olga Roger is a 38 y.o. female presenting with right hip pain 2 weeks.   History, physical and radiographs are consistent with a likely diagnosis of GTPS.   Plan: ice, voltaren gel as needed. Consider tenjet or steroid injection if not improving. Patient hesitant to do steroid injection d/t  diabetes. Continue conservative management for pain. PCP scheduled repeat radiographs in 6 months regarding 1.3 cm bone sclerosis right iliac wing.   Follow up as needed. All questions answered.      Greater trochanteric pain syndrome    Right hip pain  -     Ambulatory referral/consult to Orthopedics             A copy of today's visit note has been sent to the referring provider.     Electronically signed:  Brent Talamantes MD, MPH  08/02/2024  12:00 PM

## 2024-08-20 ENCOUNTER — TELEPHONE (OUTPATIENT)
Dept: FAMILY MEDICINE | Facility: CLINIC | Age: 39
End: 2024-08-20

## 2024-08-20 ENCOUNTER — OFFICE VISIT (OUTPATIENT)
Dept: FAMILY MEDICINE | Facility: CLINIC | Age: 39
End: 2024-08-20
Payer: COMMERCIAL

## 2024-08-20 VITALS
WEIGHT: 162.13 LBS | BODY MASS INDEX: 27.01 KG/M2 | DIASTOLIC BLOOD PRESSURE: 76 MMHG | OXYGEN SATURATION: 99 % | HEIGHT: 65 IN | SYSTOLIC BLOOD PRESSURE: 107 MMHG | HEART RATE: 85 BPM | TEMPERATURE: 99 F | RESPIRATION RATE: 18 BRPM

## 2024-08-20 DIAGNOSIS — Z79.4 TYPE 2 DIABETES MELLITUS WITH OTHER DIABETIC ARTHROPATHY, WITH LONG-TERM CURRENT USE OF INSULIN: ICD-10-CM

## 2024-08-20 DIAGNOSIS — M25.551 PAIN OF RIGHT HIP: Primary | ICD-10-CM

## 2024-08-20 DIAGNOSIS — E11.618 TYPE 2 DIABETES MELLITUS WITH OTHER DIABETIC ARTHROPATHY, WITH LONG-TERM CURRENT USE OF INSULIN: ICD-10-CM

## 2024-08-20 DIAGNOSIS — M25.559 GREATER TROCHANTERIC PAIN SYNDROME: ICD-10-CM

## 2024-08-20 PROCEDURE — 99999 PR PBB SHADOW E&M-EST. PATIENT-LVL V: CPT | Mod: PBBFAC,,, | Performed by: STUDENT IN AN ORGANIZED HEALTH CARE EDUCATION/TRAINING PROGRAM

## 2024-08-20 RX ORDER — GABAPENTIN 100 MG/1
100 CAPSULE ORAL 3 TIMES DAILY PRN
Qty: 90 CAPSULE | Refills: 11 | Status: SHIPPED | OUTPATIENT
Start: 2024-08-20 | End: 2025-08-20

## 2024-08-20 RX ORDER — TRAMADOL HYDROCHLORIDE 50 MG/1
50 TABLET ORAL EVERY 12 HOURS PRN
Qty: 30 TABLET | Refills: 5 | Status: SHIPPED | OUTPATIENT
Start: 2024-08-20

## 2024-08-20 NOTE — LETTER
August 20, 2024      [facility]  [address] [address]  Phone: [phone]  Fax: [phone]       Patient: Olga Roger   YOB: 1985  Date of Visit: 08/20/2024    To Whom It May Concern:    Boom Roger  was at [facility] on 08/20/2024. The patient may return to work/school on 08/21/2024 with no restrictions. If you have any questions or concerns, or if I can be of further assistance, please do not hesitate to contact me.    Sincerely,    Mirian Gandhi MA

## 2024-08-20 NOTE — PROGRESS NOTES
Problem List Items Addressed This Visit          Endocrine    Diabetes    Overview     Well controlled   Lab Results   Component Value Date    HGBA1C 5.6 06/19/2024       Hypoglycemic Events: none     -current meds:   Diabetes Medications               insulin (LANTUS SOLOSTAR U-100 INSULIN) glargine 100 units/mL SubQ pen Inject 30 Units into the skin every evening.    insulin lispro (HUMALOG KWIKPEN INSULIN) 100 unit/mL pen Inject 2-8 Units into the skin every 4 (four) hours. USING CORRECTION SCALE.          -on statin:   -on ACE-I/ARB:   Hypertension Medications               metoprolol succinate (TOPROL-XL) 50 MG 24 hr tablet Take 1 tablet (50 mg total) by mouth once daily.          -counseling provided on importance of diabetic diet and medication compliance in order to treat diabetes  -discussed diabetes disease course and potential complications  Follow up 3 months               Orthopedic    Greater trochanteric pain syndrome    Overview     GREATER TROCHANTER SYNDROME:  - Considered greater trochanter syndrome as per Dr. Talamantes's diagnosis.  - Mri R hip  - Considered steroid injection for pain management, but noted potential impact on blood sugar.  - Explored alternative pain management options, including Tinjet gel injection.  - Patient to apply ice to the affected area.  - Patient to perform home exercises for hip, as instructed  - gabapentin and prn tramadol for pain            Other Visit Diagnoses       Pain of right hip    -  Primary    Relevant Medications    traMADoL (ULTRAM) 50 mg tablet    gabapentin (NEURONTIN) 100 MG capsule    Other Relevant Orders    MRI Hip W WO Contrast Right                  Follow up if symptoms worsen or fail to improve.    Saskia Albert MD  _________________________________________________________________________      Patient ID: Olga Roger is a 38 y.o. female.    Chief Complaint:  r hip pain    Pain started July 2024 No known injury to hip; however, s/p I&D with  "wound vac to R inner thigh (no pain, drainage, swelling redness).    Worse with lying on r side. Some relief with arthritis tylenol.     She denies fever, chills, or redness/warmth in the affected area. She can bear weight and ambulate on the affected side, but with significant discomfort.  Reports lidocaine patches, Voltaren gel, regular and arthritis-specific Tylenol, and muscle relaxers were ineffective. Heat application provided no relief, and she has not tried ice as recommended. Tramadol 50mg provides some relief for about 6 hours, helping her sleep at night, but causes drowsiness.     She has since seen orthopedics: greater trochanter syndrome. She expresses difficulty in traveling to distant locations for medical procedures due to her current condition and lack of transportation assistance.    TREATMENT CONSIDERATIONS:  She expresses concern about potential steroid injections raising her blood sugar. An alternative gel injection (Tinjet) has been suggested as a treatment option, available at a specific location on [facility]. She denies ability to engage in physical therapy due to work constraints.    ROS:  Constitutional: -fevers, -chills  Gastrointestinal: +nausea  Musculoskeletal: +joint pain, +muscle pain         Past medical histories reviewed, including past medical, surgical, family and social histories.      Current Outpatient Medications on File Prior to Visit   Medication Sig Dispense Refill    BD SOY 2ND GEN PEN NEEDLE 32 gauge x 5/32" Ndle use as directed      blood sugar diagnostic Strp To check BG 3 times daily, to use with insurance preferred meter 100 each 0    blood-glucose meter kit To check BG 3 times daily, to use with insurance preferred meter 100 each 1    blood-glucose sensor (DEXCOM G7 SENSOR MISC) 3      blood-glucose sensor (DEXCOM G7 SENSOR) Hui 1 Device by Misc.(Non-Drug; Combo Route) route every 10 days. 3 each 11    DEXCOM G7  Misc USE  ONCE DAILY 1 each 0    insulin " (LANTUS SOLOSTAR U-100 INSULIN) glargine 100 units/mL SubQ pen Inject 30 Units into the skin every evening.      insulin lispro (HUMALOG KWIKPEN INSULIN) 100 unit/mL pen Inject 2-8 Units into the skin every 4 (four) hours. USING CORRECTION SCALE. 14.4 mL 11    lancets Misc To check BG 3 times daily, to use with insurance preferred meter 100 each 1    magnesium oxide (MAG-OX) 400 mg (241.3 mg magnesium) tablet Take 1 tablet (400 mg total) by mouth once daily. 30 tablet 2    metoclopramide HCl (REGLAN) 10 MG tablet Take 1 tablet (10 mg total) by mouth 3 (three) times daily before meals. 90 tablet 0    metoprolol succinate (TOPROL-XL) 50 MG 24 hr tablet Take 1 tablet (50 mg total) by mouth once daily. 90 tablet 3    LIDOcaine (LIDODERM) 5 % Place 1 patch onto the skin once daily. Remove & Discard patch within 12 hours or as directed by MD (Patient not taking: Reported on 8/2/2024) 14 patch 3    methocarbamoL (ROBAXIN) 750 MG Tab Take 1 tablet (750 mg total) by mouth 4 (four) times daily. 40 tablet 1     Current Facility-Administered Medications on File Prior to Visit   Medication Dose Route Frequency Provider Last Rate Last Admin    levonorgestreL (MIRENA) 20 mcg/24 hours (7 yrs) 52 mg IUD 1 Intra Uterine Device  1 Intra Uterine Device Intrauterine  Tino Garcia NP   1 Intra Uterine Device at 11/23/21 1400       Review of Systems   12 point review of systems negative except for listed in HPI.     Objective:    Nursing note and vitals reviewed.  Vitals:    08/20/24 1015   BP: 107/76   Pulse: 85   Resp: 18   Temp: 98.5 °F (36.9 °C)     Body mass index is 26.97 kg/m².     Physical Exam   Constitutional: oriented to person, place, and time. well-developed and well-nourished. No distress.   HENT: WNL  Head: Normocephalic and atraumatic.   Eyes: EOM are normal.   Neck: Normal range of motion. Neck supple.   Cardiovascular: Normal rate  Pulmonary/Chest: Effort normal. No respiratory distress.   GI: soft, non  distended, no ttp, no rebound/guarding  Musculoskeletal: Normal range of motion. no edema.  Positive tender to palpation at right greater trochanter.  Negative FADER, FABIR.  Normal gait/ no edema, erythema, or ecchymosis   + femoral pulse.  Normal gait  Neurological: CN II-XII intact  Skin: warm and dry.   Psychiatric: normal mood and affect. behavior is normal.           We Offer Telehealth & Same Day Appointments!   Book your Telehealth appointment with me through my nurse or   Clinic appointments on [employer]!  Onifyo-[phone]     To Schedule appointments online, go to [employer]: [email or web address]

## 2024-08-20 NOTE — TELEPHONE ENCOUNTER
Please assist with authorization for MRI Hip W WO Contrast Right to be done at [facility].

## 2024-08-21 PROBLEM — M25.559 GREATER TROCHANTERIC PAIN SYNDROME: Status: ACTIVE | Noted: 2024-08-21

## 2024-08-24 ENCOUNTER — TELEPHONE (OUTPATIENT)
Dept: PHARMACY | Facility: CLINIC | Age: 39
End: 2024-08-24
Payer: COMMERCIAL

## 2024-08-24 NOTE — TELEPHONE ENCOUNTER
[facility]/Population Health Chart Review & Patient Outreach Details For Medication Adherence Project    Reason for Outreach Encounter: 3rd Party payor non-compliance report ([employer], [employer], [employer], etc)  2.  Patient Outreach Method: Reviewed patient chart   3.   Medication in question: atorvastatin   LAST FILLED: n/a         4.  Reviewed and or Updates Made To: Patient Chart  5. Outreach Outcomes and/or actions taken: Medication discontinued    Additional Notes:

## 2024-09-12 ENCOUNTER — TELEPHONE (OUTPATIENT)
Dept: FAMILY MEDICINE | Facility: CLINIC | Age: 39
End: 2024-09-12
Payer: COMMERCIAL

## 2024-09-12 NOTE — TELEPHONE ENCOUNTER
----- Message from Ton Hollingsworth sent at 9/12/2024  3:30 PM CDT -----  Contact: [phone]  Type:  Sooner Apoointment Request    Caller is requesting a sooner appointment.  Caller declined first available appointment listed below.  Caller will not accept being placed on the waitlist and is requesting a message be sent to doctor.  Name of Caller:EMIR WEBER [[id number]]  When is the first available appointment?as soon as possible/ your last appointment on Tuesday 09/17  Symptoms:mri results/ antibiotics/ still in pain  Would the patient rather a call back or a response via [facility]? Call back  Best Call Back Number: [phone]  Additional Information: [id number]

## 2024-09-16 ENCOUNTER — PATIENT OUTREACH (OUTPATIENT)
Dept: ADMINISTRATIVE | Facility: HOSPITAL | Age: 39
End: 2024-09-16
Payer: COMMERCIAL

## 2024-09-16 NOTE — PROGRESS NOTES
Working DM Lab Report.     Pt has lab appt scheduled 9/20/24.  Micro albumin urine inked to appt.

## 2024-09-17 ENCOUNTER — TELEPHONE (OUTPATIENT)
Dept: SPORTS MEDICINE | Facility: CLINIC | Age: 39
End: 2024-09-17
Payer: COMMERCIAL

## 2024-09-17 ENCOUNTER — OFFICE VISIT (OUTPATIENT)
Dept: FAMILY MEDICINE | Facility: CLINIC | Age: 39
End: 2024-09-17
Payer: COMMERCIAL

## 2024-09-17 VITALS
HEART RATE: 85 BPM | BODY MASS INDEX: 26.16 KG/M2 | HEIGHT: 65 IN | OXYGEN SATURATION: 100 % | RESPIRATION RATE: 18 BRPM | SYSTOLIC BLOOD PRESSURE: 115 MMHG | TEMPERATURE: 98 F | DIASTOLIC BLOOD PRESSURE: 84 MMHG | WEIGHT: 157 LBS

## 2024-09-17 DIAGNOSIS — K31.84 DIABETIC GASTROPARESIS: Primary | ICD-10-CM

## 2024-09-17 DIAGNOSIS — E11.43 DIABETIC GASTROPARESIS: Primary | ICD-10-CM

## 2024-09-17 DIAGNOSIS — M25.559 GREATER TROCHANTERIC PAIN SYNDROME: ICD-10-CM

## 2024-09-17 DIAGNOSIS — E11.9 DIABETES MELLITUS TYPE 2 IN NONOBESE: ICD-10-CM

## 2024-09-17 DIAGNOSIS — M25.551 RIGHT HIP PAIN: Primary | ICD-10-CM

## 2024-09-17 PROCEDURE — 3079F DIAST BP 80-89 MM HG: CPT | Mod: CPTII,S$GLB,, | Performed by: STUDENT IN AN ORGANIZED HEALTH CARE EDUCATION/TRAINING PROGRAM

## 2024-09-17 PROCEDURE — 3008F BODY MASS INDEX DOCD: CPT | Mod: CPTII,S$GLB,, | Performed by: STUDENT IN AN ORGANIZED HEALTH CARE EDUCATION/TRAINING PROGRAM

## 2024-09-17 PROCEDURE — 3044F HG A1C LEVEL LT 7.0%: CPT | Mod: CPTII,S$GLB,, | Performed by: STUDENT IN AN ORGANIZED HEALTH CARE EDUCATION/TRAINING PROGRAM

## 2024-09-17 PROCEDURE — G2211 COMPLEX E/M VISIT ADD ON: HCPCS | Mod: S$GLB,,, | Performed by: STUDENT IN AN ORGANIZED HEALTH CARE EDUCATION/TRAINING PROGRAM

## 2024-09-17 PROCEDURE — 1160F RVW MEDS BY RX/DR IN RCRD: CPT | Mod: CPTII,S$GLB,, | Performed by: STUDENT IN AN ORGANIZED HEALTH CARE EDUCATION/TRAINING PROGRAM

## 2024-09-17 PROCEDURE — 99999 PR PBB SHADOW E&M-EST. PATIENT-LVL V: CPT | Mod: PBBFAC,,, | Performed by: STUDENT IN AN ORGANIZED HEALTH CARE EDUCATION/TRAINING PROGRAM

## 2024-09-17 PROCEDURE — 1159F MED LIST DOCD IN RCRD: CPT | Mod: CPTII,S$GLB,, | Performed by: STUDENT IN AN ORGANIZED HEALTH CARE EDUCATION/TRAINING PROGRAM

## 2024-09-17 PROCEDURE — 3074F SYST BP LT 130 MM HG: CPT | Mod: CPTII,S$GLB,, | Performed by: STUDENT IN AN ORGANIZED HEALTH CARE EDUCATION/TRAINING PROGRAM

## 2024-09-17 PROCEDURE — 99214 OFFICE O/P EST MOD 30 MIN: CPT | Mod: S$GLB,,, | Performed by: STUDENT IN AN ORGANIZED HEALTH CARE EDUCATION/TRAINING PROGRAM

## 2024-09-17 RX ORDER — METOCLOPRAMIDE 10 MG/1
10 TABLET ORAL
Qty: 90 TABLET | Refills: 3 | Status: SHIPPED | OUTPATIENT
Start: 2024-09-17

## 2024-09-17 RX ORDER — AMLODIPINE BESYLATE 5 MG/1
5 TABLET ORAL
COMMUNITY
Start: 2024-01-24

## 2024-09-17 NOTE — LETTER
September 17, 2024      [facility]  [address] [address]  Phone: [phone]  Fax: [phone]       Patient: Olga Roger   YOB: 1985  Date of Visit: 09/17/2024    To Whom It May Concern:    Boom Roger  was at [facility] on 09/17/2024. The patient may return to work/school on 09/17/2024 with no restrictions. If you have any questions or concerns, or if I can be of further assistance, please do not hesitate to contact me.    Sincerely,        Saskia Albert MD

## 2024-09-17 NOTE — PROGRESS NOTES
Problem List Items Addressed This Visit          Endocrine    Diabetes mellitus type 2 in nonobese    Overview     Well controlled   Lab Results   Component Value Date    HGBA1C 5.6 06/19/2024       Hypoglycemic Events: seldom     -current meds:   Diabetes Medications               insulin (LANTUS SOLOSTAR U-100 INSULIN) glargine 100 units/mL SubQ pen Inject 30 Units into the skin every evening.    insulin lispro (HUMALOG KWIKPEN INSULIN) 100 unit/mL pen Inject 2-8 Units into the skin every 4 (four) hours. USING CORRECTION SCALE.          -on statin: will discuss at f/u  -on ACE-I/ARB:   Hypertension Medications               amLODIPine (NORVASC) 5 MG tablet Take 5 mg by mouth.    metoprolol succinate (TOPROL-XL) 50 MG 24 hr tablet Take 1 tablet (50 mg total) by mouth once daily.          -counseling provided on importance of diabetic diet and medication compliance in order to treat diabetes  -discussed diabetes disease course and potential complications  Follow up 3 months               Orthopedic    Greater trochanteric pain syndrome    Overview     GREATER TROCHANTER SYNDROME:  Chronic hx; minimal improvement with tramadol  She would like hip injection  - gabapentin and prn tramadol for pain      MRI R hip 2024  IMPRESSION:   1.  No acute or suspicious abnormality identified in the right hip. 1 cm benign incidental sclerotic bone island right iliac wing.   2.  Chondromalacia in the superior right hip with a small chondral defect of the acetabular roof.  Possible partial tears of the anterior and superior labrum.   3.  Mild abductor tendinosis.  Otherwise unremarkable exam.                  Other Visit Diagnoses       Diabetic gastroparesis    -  Primary    Relevant Medications    metoclopramide HCl (REGLAN) 10 MG tablet            Assessment & Plan    RIGHT HIP CHONDROMALACIA AND LABRAL TEARS:  - Chondromalacia (worn cartilage) in right hip.  - Possible tears in anterior and superior labrum of right hip.  - Mild  "abductor tendinosis in right hip.  - Explained MRI findings using visual aids  - appt with dr. qureshi next wek      FOLLOW UP:  - Follow up as needed.       _________________________________________________________________________      Patient ID: Olga Roger is a 38 y.o. female.    Chief Complaint:  r hip pain  She would like injection. Some relief with tramadol.    Pain started July 2024. No known injury to hip; however, s/p I&D with wound vac to R inner thigh (no pain, drainage, swelling redness).    Worse with lying on r side. Some relief with arthritis tylenol.     She denies fever, chills, or redness/warmth in the affected area. She can bear weight and ambulate on the affected side, but with significant discomfort.  Reports lidocaine patches, Voltaren gel, regular and arthritis-specific Tylenol, and muscle relaxers were ineffective. Heat application provided no relief, and she has not tried ice as recommended. Tramadol 50mg provides some relief for about 6 hours, helping her sleep at night, but causes drowsiness.     She has since seen orthopedics: greater trochanter syndrome. She expresses difficulty in traveling to distant locations for medical procedures due to her current condition and lack of transportation assistance.    ROS:  Constitutional: -fevers, -chills  Gastrointestinal: +nausea  Musculoskeletal: +joint pain, +muscle pain         Past medical histories reviewed, including past medical, surgical, family and social histories.      Current Outpatient Medications on File Prior to Visit   Medication Sig Dispense Refill    amLODIPine (NORVASC) 5 MG tablet Take 5 mg by mouth.      BD SOY 2ND GEN PEN NEEDLE 32 gauge x 5/32" Ndle use as directed      blood sugar diagnostic Strp To check BG 3 times daily, to use with insurance preferred meter 100 each 0    blood-glucose sensor (DEXCOM G7 SENSOR MISC) 3      blood-glucose sensor (DEXCOM G7 SENSOR) Hui 1 Device by Misc.(Non-Drug; Combo Route) route " every 10 days. 3 each 11    DEXCOM G7  Misc USE  ONCE DAILY 1 each 0    gabapentin (NEURONTIN) 100 MG capsule Take 1 capsule (100 mg total) by mouth 3 (three) times daily as needed (r hip pain). 90 capsule 11    insulin (LANTUS SOLOSTAR U-100 INSULIN) glargine 100 units/mL SubQ pen Inject 30 Units into the skin every evening.      insulin lispro (HUMALOG KWIKPEN INSULIN) 100 unit/mL pen Inject 2-8 Units into the skin every 4 (four) hours. USING CORRECTION SCALE. 14.4 mL 11    lancets Misc To check BG 3 times daily, to use with insurance preferred meter 100 each 1    magnesium oxide (MAG-OX) 400 mg (241.3 mg magnesium) tablet Take 1 tablet (400 mg total) by mouth once daily. 30 tablet 2    methocarbamoL (ROBAXIN) 750 MG Tab Take 1 tablet (750 mg total) by mouth 4 (four) times daily. 40 tablet 1    metoprolol succinate (TOPROL-XL) 50 MG 24 hr tablet Take 1 tablet (50 mg total) by mouth once daily. 90 tablet 3    traMADoL (ULTRAM) 50 mg tablet Take 1 tablet (50 mg total) by mouth every 12 (twelve) hours as needed for Pain. 30 tablet 5    [DISCONTINUED] metoclopramide HCl (REGLAN) 10 MG tablet Take 1 tablet (10 mg total) by mouth 3 (three) times daily before meals. 90 tablet 0    blood-glucose meter kit To check BG 3 times daily, to use with insurance preferred meter 100 each 1    LIDOcaine (LIDODERM) 5 % Place 1 patch onto the skin once daily. Remove & Discard patch within 12 hours or as directed by MD (Patient not taking: Reported on 8/2/2024) 14 patch 3     Current Facility-Administered Medications on File Prior to Visit   Medication Dose Route Frequency Provider Last Rate Last Admin    levonorgestreL (MIRENA) 20 mcg/24 hours (7 yrs) 52 mg IUD 1 Intra Uterine Device  1 Intra Uterine Device Intrauterine  Tino Garica, NP   1 Intra Uterine Device at 11/23/21 1400       Review of Systems   12 point review of systems negative except for listed in HPI.     Objective:    Nursing note and vitals  reviewed.  Vitals:    09/17/24 0914   BP: 115/84   Pulse: 85   Resp: 18   Temp: 98.4 °F (36.9 °C)     Body mass index is 26.13 kg/m².     Physical Exam   Constitutional: oriented to person, place, and time. well-developed and well-nourished. No distress.   HENT: WNL  Head: Normocephalic and atraumatic.   Eyes: EOM are normal.   Neck: Normal range of motion. Neck supple.   Cardiovascular: Normal rate  Pulmonary/Chest: Effort normal. No respiratory distress.   GI: soft, non distended, no ttp, no rebound/guarding  Musculoskeletal: Normal range of motion. no edema.  Positive tender to palpation at right greater trochanter.  Negative FADER, FABIR.  Normal gait/ no edema, erythema, or ecchymosis   + femoral pulse.  Normal gait  Neurological: CN II-XII intact  Skin: warm and dry.   Psychiatric: normal mood and affect. behavior is normal.           We Offer Telehealth & Same Day Appointments!   Book your Telehealth appointment with me through my nurse or   Clinic appointments on [employer]!  [phone]     To Schedule appointments online, go to [employer]: [email or web address]

## 2024-09-17 NOTE — TELEPHONE ENCOUNTER
Called and scheduled Tenjet procedure for hip.  Patient verbalized understanding of appt date and time and instructions for pre procedure.

## 2024-09-17 NOTE — LETTER
September 17, 2024      [facility]  [address] [address]  Phone: [phone]  Fax: [phone]       Patient: Olga Roger   YOB: 1985  Date of Visit: 09/17/2024    To Whom It May Concern:    Boom Roger  was at [facility] on 09/17/2024. The patient may return to work/school on 09/18/2024 with no restrictions. If you have any questions or concerns, or if I can be of further assistance, please do not hesitate to contact me.    Sincerely,        Saskia Albert MD

## 2024-09-19 ENCOUNTER — PATIENT MESSAGE (OUTPATIENT)
Dept: SPORTS MEDICINE | Facility: CLINIC | Age: 39
End: 2024-09-19
Payer: COMMERCIAL

## 2024-09-19 ENCOUNTER — TELEPHONE (OUTPATIENT)
Dept: SPORTS MEDICINE | Facility: CLINIC | Age: 39
End: 2024-09-19
Payer: COMMERCIAL

## 2024-09-19 NOTE — TELEPHONE ENCOUNTER
Returned phone call to mother and discussed pain with her.  Discussed with provider and he feels, based on MRI results, that Tenjet procedure will not target the area of the hip that, according to MRI, is causing the pain.  Feels that an intra articular injection would be a better option.  Patient's diabetes is under control, as this was an earlier concern.  Mother asked that patient be called and have this explained to her.  Contacted patient and explained situation and offered a sooner appointment.  Patient elected to keep original appt date and time and will discuss with provider whether Tenjet or CSI best route.  Provided patient with note for missing work today.   ----- Message from Maye Butler sent at 9/19/2024 12:07 PM CDT -----  Contact: Nupur(mom)  Mom is calling in and requesting to speak with Reba Schaeffer she states that the patient is in so much pain to where she can't walk. She is asking is there anyway to be seen sooner than the appointment that is scheduled. Call Back is [phone]

## 2024-09-26 ENCOUNTER — PROCEDURE VISIT (OUTPATIENT)
Dept: SPORTS MEDICINE | Facility: CLINIC | Age: 39
End: 2024-09-26
Payer: COMMERCIAL

## 2024-09-26 VITALS — WEIGHT: 156.94 LBS | BODY MASS INDEX: 26.15 KG/M2 | HEIGHT: 65 IN

## 2024-09-26 DIAGNOSIS — M25.559 GREATER TROCHANTERIC PAIN SYNDROME: ICD-10-CM

## 2024-09-26 DIAGNOSIS — M76.891 TENDINITIS INVOLVING RIGHT HIP ABDUCTORS: Primary | ICD-10-CM

## 2024-09-26 DIAGNOSIS — M25.551 RIGHT HIP PAIN: ICD-10-CM

## 2024-09-26 NOTE — PATIENT INSTRUCTIONS
Assessment:  Olga Roger is a 38 y.o. female   Chief Complaint   Patient presents with    Right Hip - Pain       Encounter Diagnoses   Name Primary?    Right hip pain     Greater trochanteric pain syndrome     Tendinitis involving right hip abductors Yes        Plan:  Tenjet procedure to the right hip  Following the tenotomy procedure you had today, you should:  Avoid NSAIDs for 2 weeks (this includes over the counter medications like Ibuprofen, Motrin, Advil and Alleve)  Avoid Ice for 2 weeks  Avoid heavy lifting that would put too much strain on the elbow  If still attending physical therapy, wait 2 weeks before resuming.             Follow-up: 6 weeks or sooner if there are problems between now and then.    Thank you for choosing [facility] and Dr. Brent Talamantes for your orthopedic & sports medicine care. It is our goal to provide you with exceptional care that will help keep you healthy, active, and get you back in the game.    Please do not hesitate to reach out to us via email, phone, or MyChart with any questions, concerns, or feedback.    If you felt that you received exemplary care today, please consider leaving us feedback on [employer] at:  [email or web address]    If you are experiencing pain/discomfort ,or have questions after 5pm and would like to be connected to the [facility] on-call team, please call this number and specify which Sports Medicine provider is treating you: [phone]

## 2024-10-03 ENCOUNTER — OFFICE VISIT (OUTPATIENT)
Dept: FAMILY MEDICINE | Facility: CLINIC | Age: 39
End: 2024-10-03
Payer: COMMERCIAL

## 2024-10-03 VITALS
RESPIRATION RATE: 16 BRPM | DIASTOLIC BLOOD PRESSURE: 81 MMHG | BODY MASS INDEX: 26.18 KG/M2 | OXYGEN SATURATION: 98 % | TEMPERATURE: 98 F | HEIGHT: 65 IN | WEIGHT: 157.13 LBS | HEART RATE: 90 BPM | SYSTOLIC BLOOD PRESSURE: 120 MMHG

## 2024-10-03 DIAGNOSIS — M25.551 RIGHT HIP PAIN: Primary | ICD-10-CM

## 2024-10-03 DIAGNOSIS — M25.559 GREATER TROCHANTERIC PAIN SYNDROME: ICD-10-CM

## 2024-10-03 DIAGNOSIS — M25.551 PAIN OF RIGHT HIP: ICD-10-CM

## 2024-10-03 PROCEDURE — 3044F HG A1C LEVEL LT 7.0%: CPT | Mod: CPTII,S$GLB,, | Performed by: STUDENT IN AN ORGANIZED HEALTH CARE EDUCATION/TRAINING PROGRAM

## 2024-10-03 PROCEDURE — 3074F SYST BP LT 130 MM HG: CPT | Mod: CPTII,S$GLB,, | Performed by: STUDENT IN AN ORGANIZED HEALTH CARE EDUCATION/TRAINING PROGRAM

## 2024-10-03 PROCEDURE — 3079F DIAST BP 80-89 MM HG: CPT | Mod: CPTII,S$GLB,, | Performed by: STUDENT IN AN ORGANIZED HEALTH CARE EDUCATION/TRAINING PROGRAM

## 2024-10-03 PROCEDURE — 99214 OFFICE O/P EST MOD 30 MIN: CPT | Mod: S$GLB,,, | Performed by: STUDENT IN AN ORGANIZED HEALTH CARE EDUCATION/TRAINING PROGRAM

## 2024-10-03 PROCEDURE — 1159F MED LIST DOCD IN RCRD: CPT | Mod: CPTII,S$GLB,, | Performed by: STUDENT IN AN ORGANIZED HEALTH CARE EDUCATION/TRAINING PROGRAM

## 2024-10-03 PROCEDURE — 99999 PR PBB SHADOW E&M-EST. PATIENT-LVL V: CPT | Mod: PBBFAC,,, | Performed by: STUDENT IN AN ORGANIZED HEALTH CARE EDUCATION/TRAINING PROGRAM

## 2024-10-03 PROCEDURE — 3008F BODY MASS INDEX DOCD: CPT | Mod: CPTII,S$GLB,, | Performed by: STUDENT IN AN ORGANIZED HEALTH CARE EDUCATION/TRAINING PROGRAM

## 2024-10-03 PROCEDURE — G2211 COMPLEX E/M VISIT ADD ON: HCPCS | Mod: S$GLB,,, | Performed by: STUDENT IN AN ORGANIZED HEALTH CARE EDUCATION/TRAINING PROGRAM

## 2024-10-03 PROCEDURE — 1160F RVW MEDS BY RX/DR IN RCRD: CPT | Mod: CPTII,S$GLB,, | Performed by: STUDENT IN AN ORGANIZED HEALTH CARE EDUCATION/TRAINING PROGRAM

## 2024-10-03 RX ORDER — GABAPENTIN 300 MG/1
300 CAPSULE ORAL 3 TIMES DAILY PRN
Qty: 90 CAPSULE | Refills: 11 | Status: SHIPPED | OUTPATIENT
Start: 2024-10-03 | End: 2025-10-03

## 2024-10-03 RX ORDER — HYDROCODONE BITARTRATE AND ACETAMINOPHEN 5; 325 MG/1; MG/1
1 TABLET ORAL EVERY 12 HOURS PRN
Qty: 60 TABLET | Refills: 0 | Status: SHIPPED | OUTPATIENT
Start: 2024-10-03

## 2024-10-03 NOTE — PROGRESS NOTES
Problem List Items Addressed This Visit          Orthopedic    Right hip pain - Primary    Overview     Chronic hx s/p tenjet injection with no relief   Follows [facility]  Referral PT           Relevant Medications    gabapentin (NEURONTIN) 300 MG capsule    HYDROcodone-acetaminophen (NORCO) 5-325 mg per tablet    diclofenac 0.15% LIDOcaine 2.25% prilocaine 2.25% topical cream    Other Relevant Orders    Ambulatory referral/consult to Physical/Occupational Therapy    Greater trochanteric pain syndrome    Overview     GREATER TROCHANTER SYNDROME:  Chronic hx; s/p tenjet sept 2024  - gabapentin and prn norco for pain (tramadol no longer helping pain)      MRI R hip 2024  IMPRESSION:   1.  No acute or suspicious abnormality identified in the right hip. 1 cm benign incidental sclerotic bone island right iliac wing.   2.  Chondromalacia in the superior right hip with a small chondral defect of the acetabular roof.  Possible partial tears of the anterior and superior labrum.   3.  Mild abductor tendinosis.  Otherwise unremarkable exam.                  Other Visit Diagnoses       Pain of right hip        Relevant Medications    gabapentin (NEURONTIN) 300 MG capsule    HYDROcodone-acetaminophen (NORCO) 5-325 mg per tablet    diclofenac 0.15% LIDOcaine 2.25% prilocaine 2.25% topical cream             Assessment & Plan    CHRONIC PAIN:  - Determined need for stronger pain medication due to inadequate relief from current regimen.  - Assessed potential benefits of topical treatments, including Lidoderm patches and Voltaren gel.  - Educated patient on the importance of being cautious with hydrocodone due to its strength.  - Increased gabapentin to 300 mg 3 times daily.  - Discontinued tramadol due to ineffectiveness.  - Started hydrocodone 5 mg, take up to 2 times daily as needed for pain, 60 tablets prescribed.  - Discontinued Lidoderm patches due to ineffectiveness.  - Prescribed compound medication to be obtained from compounding  "pharmacy.    POST-OPERATIVE CARE:  - Evaluated incision site for signs of infection or complications.      F/u as needed     Saskia Albert MD  _________________________________________________________________________      Patient ID: Olga Roger is a 38 y.o. female.    History of Present Illness    CHIEF COMPLAINT:  Patient presents today for persistent hip pain following a medical procedure.    HIP PAIN AND TREATMENT:  She reports persistent hip pain following a Tenjet procedure on September 26th. Initial relief lasted 1-2 days post-procedure, with some burning sensation immediately after, but the pain has since returned. She describes the current pain as stabbing with shocking, electric-like characteristics, significantly interfering with sleep.     MEDICATION AND PAIN MANAGEMENT:  She reports that tramadol 100mg is not providing relief. Gabapentin 100, taken 3 times daily as needed, has never been effective. Lidoderm patches, used for extended periods (less than 12 hours), provide no relief, even initially. Previous treatment with Voltaren gel was also ineffective. She has not tried Pilocaine yet and expresses interest in new treatments as current pain management strategies have been inadequate.      Past medical histories reviewed, including past medical, surgical, family and social histories.      Current Outpatient Medications on File Prior to Visit   Medication Sig Dispense Refill    amLODIPine (NORVASC) 5 MG tablet Take 5 mg by mouth.      BD SOY 2ND GEN PEN NEEDLE 32 gauge x 5/32" Ndle use as directed      blood sugar diagnostic Strp To check BG 3 times daily, to use with insurance preferred meter 100 each 0    blood-glucose sensor (DEXCOM G7 SENSOR MISC) 3      blood-glucose sensor (DEXCOM G7 SENSOR) Hui 1 Device by Misc.(Non-Drug; Combo Route) route every 10 days. 3 each 11    DEXCOM G7  Misc USE  ONCE DAILY 1 each 0    insulin (LANTUS SOLOSTAR U-100 INSULIN) glargine 100 units/mL SubQ pen " Inject 30 Units into the skin every evening.      insulin lispro (HUMALOG KWIKPEN INSULIN) 100 unit/mL pen Inject 2-8 Units into the skin every 4 (four) hours. USING CORRECTION SCALE. 14.4 mL 11    lancets Misc To check BG 3 times daily, to use with insurance preferred meter 100 each 1    magnesium oxide (MAG-OX) 400 mg (241.3 mg magnesium) tablet Take 1 tablet (400 mg total) by mouth once daily. 30 tablet 2    methocarbamoL (ROBAXIN) 750 MG Tab Take 1 tablet (750 mg total) by mouth 4 (four) times daily. 40 tablet 1    metoclopramide HCl (REGLAN) 10 MG tablet Take 1 tablet (10 mg total) by mouth 3 (three) times daily before meals. 90 tablet 3    metoprolol succinate (TOPROL-XL) 50 MG 24 hr tablet Take 1 tablet (50 mg total) by mouth once daily. 90 tablet 3    blood-glucose meter kit To check BG 3 times daily, to use with insurance preferred meter 100 each 1     Current Facility-Administered Medications on File Prior to Visit   Medication Dose Route Frequency Provider Last Rate Last Admin    levonorgestreL (MIRENA) 20 mcg/24 hours (7 yrs) 52 mg IUD 1 Intra Uterine Device  1 Intra Uterine Device Intrauterine  Tino Garcia, NP   1 Intra Uterine Device at 11/23/21 1400       Review of Systems   12 point review of systems negative except for listed in HPI.     Objective:    Nursing note and vitals reviewed.  Vitals:    10/03/24 0836   BP: 120/81   Pulse: 90   Resp: 16   Temp: 97.7 °F (36.5 °C)     Body mass index is 26.14 kg/m².     Physical Exam   Constitutional: oriented to person, place, and time. well-developed and well-nourished. No distress.   HENT: WNL  Head: Normocephalic and atraumatic.   Eyes: EOM are normal.   Neck: Normal range of motion. Neck supple.   Cardiovascular: Normal rate  Pulmonary/Chest: Effort normal. No respiratory distress.   GI: soft, non distended, no ttp, no rebound/guarding  Musculoskeletal: R hip: + ttp to R glut and greater trochanter, no edema, erythema, or ecchymosis    Neurological: CN II-XII intact  Skin: warm and dry.   Psychiatric: normal mood and affect. behavior is normal.           We Offer Telehealth & Same Day Appointments!   Book your Telehealth appointment with me through my nurse or   Clinic appointments on [employer]!  [phone]     To Schedule appointments online, go to [employer]: [email or web address]       Visit today included increased complexity associated with the care of the episodic problem addressed and managing the longitudinal care of the patient due to the serious and/or complex managed problem(s) as per problem list.

## 2024-10-03 NOTE — LETTER
October 3, 2024      [facility] [place]  [address] [address]  Phone: [phone]  Fax: [phone]       Patient: Olga Roger   YOB: 1985  Date of Visit: 10/03/2024    To Whom It May Concern:    Boom Roger  was at [facility] on 10/03/2024. The patient may return to work/school on 10/04/2024 with no restrictions. If you have any questions or concerns, or if I can be of further assistance, please do not hesitate to contact me.    Sincerely,    Mirian Gandhi MA

## 2024-10-21 ENCOUNTER — TELEPHONE (OUTPATIENT)
Dept: FAMILY MEDICINE | Facility: CLINIC | Age: 39
End: 2024-10-21
Payer: COMMERCIAL

## 2024-10-21 NOTE — TELEPHONE ENCOUNTER
----- Message from Ton sent at 10/21/2024  4:46 PM CDT -----  Contact: [phone]  Type:  Sooner Apoointment Request    Caller is requesting a sooner appointment.  Caller declined first available appointment listed below.  Caller will not accept being placed on the waitlist and is requesting a message be sent to doctor.  Name of Caller:EMIR WEBER [[id number]]  When is the first available appointment?10/24.... need to be seen tomorrow  Symptoms:right hip pain  Would the patient rather a call back or a response via [facility]? Call back  Best Call Back Number: [phone]  Additional Information: [phone]

## 2024-10-21 NOTE — TELEPHONE ENCOUNTER
Returned patient's call. Patient was offered to be seen by Mari Mercado tomorrow. Patient declined. Patient offered Thursday with Dr. Albert. Patient declined, stating she will wait until the appointment she already has scheduled.

## 2024-10-22 ENCOUNTER — HOSPITAL ENCOUNTER (OUTPATIENT)
Dept: RADIOLOGY | Facility: HOSPITAL | Age: 39
Discharge: HOME | End: 2024-10-22
Attending: PHYSICIAN ASSISTANT
Payer: COMMERCIAL

## 2024-10-22 ENCOUNTER — OFFICE VISIT (OUTPATIENT)
Dept: FAMILY MEDICINE | Facility: CLINIC | Age: 39
End: 2024-10-22
Payer: COMMERCIAL

## 2024-10-22 VITALS
WEIGHT: 158.5 LBS | RESPIRATION RATE: 16 BRPM | BODY MASS INDEX: 26.41 KG/M2 | OXYGEN SATURATION: 99 % | HEART RATE: 106 BPM | TEMPERATURE: 98 F | SYSTOLIC BLOOD PRESSURE: 121 MMHG | HEIGHT: 65 IN | DIASTOLIC BLOOD PRESSURE: 78 MMHG

## 2024-10-22 DIAGNOSIS — M54.41 CHRONIC RIGHT-SIDED LOW BACK PAIN WITH RIGHT-SIDED SCIATICA: ICD-10-CM

## 2024-10-22 DIAGNOSIS — G89.29 CHRONIC RIGHT-SIDED LOW BACK PAIN WITH RIGHT-SIDED SCIATICA: ICD-10-CM

## 2024-10-22 DIAGNOSIS — M25.551 RIGHT HIP PAIN: ICD-10-CM

## 2024-10-22 DIAGNOSIS — M25.551 RIGHT HIP PAIN: Primary | ICD-10-CM

## 2024-10-22 PROCEDURE — 99214 OFFICE O/P EST MOD 30 MIN: CPT | Mod: S$GLB,,, | Performed by: PHYSICIAN ASSISTANT

## 2024-10-22 PROCEDURE — 1159F MED LIST DOCD IN RCRD: CPT | Mod: CPTII,S$GLB,, | Performed by: PHYSICIAN ASSISTANT

## 2024-10-22 PROCEDURE — 3078F DIAST BP <80 MM HG: CPT | Mod: CPTII,S$GLB,, | Performed by: PHYSICIAN ASSISTANT

## 2024-10-22 PROCEDURE — 72100 X-RAY EXAM L-S SPINE 2/3 VWS: CPT | Mod: TC,PO

## 2024-10-22 PROCEDURE — 3008F BODY MASS INDEX DOCD: CPT | Mod: CPTII,S$GLB,, | Performed by: PHYSICIAN ASSISTANT

## 2024-10-22 PROCEDURE — 1160F RVW MEDS BY RX/DR IN RCRD: CPT | Mod: CPTII,S$GLB,, | Performed by: PHYSICIAN ASSISTANT

## 2024-10-22 PROCEDURE — 73502 X-RAY EXAM HIP UNI 2-3 VIEWS: CPT | Mod: TC,PO,RT

## 2024-10-22 PROCEDURE — 73502 X-RAY EXAM HIP UNI 2-3 VIEWS: CPT | Mod: 26,RT,, | Performed by: RADIOLOGY

## 2024-10-22 PROCEDURE — 3074F SYST BP LT 130 MM HG: CPT | Mod: CPTII,S$GLB,, | Performed by: PHYSICIAN ASSISTANT

## 2024-10-22 PROCEDURE — 3044F HG A1C LEVEL LT 7.0%: CPT | Mod: CPTII,S$GLB,, | Performed by: PHYSICIAN ASSISTANT

## 2024-10-22 PROCEDURE — 99999 PR PBB SHADOW E&M-EST. PATIENT-LVL V: CPT | Mod: PBBFAC,,, | Performed by: PHYSICIAN ASSISTANT

## 2024-10-22 PROCEDURE — 72100 X-RAY EXAM L-S SPINE 2/3 VWS: CPT | Mod: 26,,, | Performed by: RADIOLOGY

## 2024-10-22 RX ORDER — PREGABALIN 50 MG/1
50 CAPSULE ORAL 3 TIMES DAILY
Qty: 90 CAPSULE | Refills: 2 | Status: SHIPPED | OUTPATIENT
Start: 2024-10-22 | End: 2024-10-22

## 2024-10-22 NOTE — LETTER
October 22, 2024      [facility]  [address] [address]  Phone: [phone]  Fax: [phone]       Patient: Olga Roger   YOB: 1985  Date of Visit: 10/22/2024    To Whom It May Concern:    Boom Roger  was at [facility] on 10/22/2024. The patient may return to work/school on 10/28/2024  with no restrictions. If you have any questions or concerns, or if I can be of further assistance, please do not hesitate to contact me.    Sincerely,      NORI Galicia LPN

## 2024-10-22 NOTE — PROGRESS NOTES
Results have been released via [employer]. Please verify that these have been viewed by patient. If not, please call patient with results.     I have sent a message to them with the following interpretation (see below).    I have reviewed your recent R hip and lumbar XR which both showed no acute findings or significant abnormalities. Please start the medication that was prescribed at your visit and follow up with ortho and pain management as discussed.     Please do not hesitate to call or message with any additional questions or concerns.    Mari Mercado PA-C

## 2024-10-22 NOTE — PROGRESS NOTES
Assessment/Plan:    Problem List Items Addressed This Visit          Orthopedic    Right hip pain - Primary    Overview     Chronic hx   S/p tenjet injection with no relief   Follows [facility]  Referral PT, but patient requesting to hold off           Relevant Medications    pregabalin (LYRICA) 50 MG capsule    Other Relevant Orders    X-Ray Hip 2 or 3 views Right with Pelvis when performed     Other Visit Diagnoses       Chronic right-sided low back pain with right-sided sciatica        Relevant Medications    pregabalin (LYRICA) 50 MG capsule    Other Relevant Orders    X-Ray Lumbar Spine 2 Or 3 Views    Ambulatory referral/consult to Pain Clinic        -R hip and R sided low back pain >3 months  -no alarm symptoms or signs present  -will obtain XRs today  -continue conservative treatment, rest, ice/heat applications, PRN anti-inflammatory medication  -trial of Lyrica; Gabapentin ineffective  -recommend close follow up with ortho  -also referring to pain management  -declined PT at this time  -ER precautions for severe or worsening of symptoms    Follow up if symptoms worsen or fail to improve.    Mari Mercado PA-C  _____________________________________________________________________________________________________________________________________________________    CC: right hip pain    HPI: Patient is in clinic today as an established patient here for right hip pain.    RIGHT HIP AND LEG PAIN:  She reports right hip pain radiating to the thigh and right low back, describing a burning sensation present for approximately 3 months. The pain extends down the entire thigh. She denies pain along the spine but confirms discomfort in the right buttock area and hip joint. Her symptoms may have recently worsened. She did have an MRI of her right hip on September 5th followed by a Tenjet procedure on September 26th with ortho (Dr. Larios). She reports having relief for 1-2 days with recurrence of pain shortly after that  "time. Current medications include Robaxin (methocarbamol) and hydrocodone with Tylenol (Norco), which are not providing adequate pain relief. Gabapentin was prescribed but not taken due to lack of efficacy. Lidoderm patches and tramadol have also been tried but were ineffective for pain management.    MEDICAL HISTORY:  She has a history of diabetes and gastroparesis. In January 2024, she was hospitalized for a large abscess on her right inner thigh that required operative debridement and wound VAC placement. She occasionally experiences discomfort in the area of the previous abscess.    No other complaints today.    Past Medical History:   Diagnosis Date    Diabetes mellitus type 2 in nonobese 10/31/2018    A1c >14% as of 11/1/19; Stopped metformin 2/2 diarrhea -> Lantus & atorvastatin on 11/1/19    Hypertriglyceridemia 11/01/2019    TG 1,261 mg/dL, for which we will consider fenofibrate if atorvastatin doesn't causes significant transaminitis (will also consider Lovaza)    Overweight (BMI 25.0-29.9)      History reviewed. No pertinent surgical history.  Review of patient's allergies indicates:   Allergen Reactions    [place] Hives     Social History     Tobacco Use    Smoking status: Never    Smokeless tobacco: Never   Substance Use Topics    Alcohol use: Not Currently     Alcohol/week: 6.0 standard drinks of alcohol     Types: 4 Glasses of wine, 2 Shots of liquor per week    Drug use: Never     Family History   Problem Relation Name Age of Onset    Diabetes Father Manuel Kana     Blindness Father Manuel Kana     No Known Problems Mother      Breast cancer Maternal Grandmother      Breast cancer Maternal Aunt Diatrise Self 45    Cancer Maternal Aunt Diatrise Self         Breast    Depression Brother Corky Walters      Current Outpatient Medications on File Prior to Visit   Medication Sig Dispense Refill    amLODIPine (NORVASC) 5 MG tablet Take 5 mg by mouth.      BD SOY 2ND GEN PEN NEEDLE 32 gauge x 5/32" " Ndle use as directed      blood sugar diagnostic Strp To check BG 3 times daily, to use with insurance preferred meter 100 each 0    blood-glucose meter kit To check BG 3 times daily, to use with insurance preferred meter 100 each 1    blood-glucose sensor (DEXCOM G7 SENSOR MISC) 3      blood-glucose sensor (DEXCOM G7 SENSOR) Hui 1 Device by Misc.(Non-Drug; Combo Route) route every 10 days. 3 each 11    DEXCOM G7  Misc USE  ONCE DAILY 1 each 0    diclofenac 0.15% LIDOcaine 2.25% prilocaine 2.25% topical cream Apply topically 2 (two) times daily as needed (as needed for pain). 480 g 1    gabapentin (NEURONTIN) 300 MG capsule Take 1 capsule (300 mg total) by mouth 3 (three) times daily as needed (r hip pain). 90 capsule 11    HYDROcodone-acetaminophen (NORCO) 5-325 mg per tablet Take 1 tablet by mouth every 12 (twelve) hours as needed for Pain. 60 tablet 0    insulin (LANTUS SOLOSTAR U-100 INSULIN) glargine 100 units/mL SubQ pen Inject 30 Units into the skin every evening.      insulin lispro (HUMALOG KWIKPEN INSULIN) 100 unit/mL pen Inject 2-8 Units into the skin every 4 (four) hours. USING CORRECTION SCALE. 14.4 mL 11    lancets Misc To check BG 3 times daily, to use with insurance preferred meter 100 each 1    magnesium oxide (MAG-OX) 400 mg (241.3 mg magnesium) tablet Take 1 tablet (400 mg total) by mouth once daily. 30 tablet 2    methocarbamoL (ROBAXIN) 750 MG Tab Take 1 tablet (750 mg total) by mouth 4 (four) times daily. 40 tablet 1    metoclopramide HCl (REGLAN) 10 MG tablet Take 1 tablet (10 mg total) by mouth 3 (three) times daily before meals. 90 tablet 3    metoprolol succinate (TOPROL-XL) 50 MG 24 hr tablet Take 1 tablet (50 mg total) by mouth once daily. 90 tablet 3     Current Facility-Administered Medications on File Prior to Visit   Medication Dose Route Frequency Provider Last Rate Last Admin    levonorgestreL (MIRENA) 20 mcg/24 hours (7 yrs) 52 mg IUD 1 Intra Uterine Device  1 Intra  "Uterine Device Intrauterine  Tino Garcia, NP   1 Intra Uterine Device at 11/23/21 1400       Review of Systems   Constitutional:  Negative for chills, diaphoresis, fatigue and fever.   HENT:  Negative for congestion, ear pain, postnasal drip, sinus pain and sore throat.    Eyes:  Negative for pain and redness.   Respiratory:  Negative for cough, chest tightness and shortness of breath.    Cardiovascular:  Negative for chest pain and leg swelling.   Gastrointestinal:  Negative for abdominal pain, constipation, diarrhea, nausea and vomiting.   Genitourinary:  Negative for dysuria and hematuria.   Musculoskeletal:  Positive for arthralgias and back pain. Negative for joint swelling.   Skin:  Negative for rash.   Neurological:  Negative for dizziness, syncope and headaches.   Psychiatric/Behavioral:  Negative for dysphoric mood. The patient is not nervous/anxious.        Vitals:    10/22/24 0912   BP: 121/78   Pulse: 106   Resp: 16   Temp: 98.2 °F (36.8 °C)   TempSrc: Oral   SpO2: 99%   Weight: 71.9 kg (158 lb 8 oz)   Height: 5' 5" (1.651 m)       Wt Readings from Last 3 Encounters:   10/22/24 71.9 kg (158 lb 8 oz)   10/03/24 71.3 kg (157 lb 1.6 oz)   09/26/24 71.2 kg (156 lb 15.5 oz)       Physical Exam  Constitutional:       General: She is not in acute distress.     Appearance: Normal appearance. She is well-developed.   HENT:      Head: Normocephalic and atraumatic.   Eyes:      Conjunctiva/sclera: Conjunctivae normal.   Cardiovascular:      Rate and Rhythm: Normal rate and regular rhythm.      Pulses: Normal pulses.      Heart sounds: Normal heart sounds. No murmur heard.  Pulmonary:      Effort: Pulmonary effort is normal. No respiratory distress.      Breath sounds: Normal breath sounds.   Abdominal:      General: Bowel sounds are normal. There is no distension.      Palpations: Abdomen is soft.      Tenderness: There is no abdominal tenderness.   Musculoskeletal:      Cervical back: Normal range of " motion and neck supple.      Lumbar back: Tenderness (R side) present. No swelling or deformity. Normal range of motion.      Right hip: Tenderness present. No deformity or lacerations. Normal range of motion.   Skin:     General: Skin is warm and dry.      Findings: No rash.   Neurological:      General: No focal deficit present.      Mental Status: She is alert and oriented to person, place, and time.   Psychiatric:         Mood and Affect: Mood normal.         Behavior: Behavior normal.         Health Maintenance   Topic Date Due    Hemoglobin A1c  01/10/2025    Lipid Panel  07/10/2025    Eye Exam  08/28/2025    Foot Exam  09/17/2025    TETANUS VACCINE  02/16/2026    Hepatitis C Screening  Completed     DISCLAIMER: This note was compiled by using a speech recognition dictation system and therefore please be aware that typographical / speech recognition errors can and do occur.  Please contact me if you see any errors specifically.  Consent was obtained for [employer] recording system prior to the visit.

## 2024-10-23 ENCOUNTER — TELEPHONE (OUTPATIENT)
Dept: PAIN MEDICINE | Facility: CLINIC | Age: 39
End: 2024-10-23
Payer: COMMERCIAL

## 2024-10-23 NOTE — TELEPHONE ENCOUNTER
Reached out to patient to schedule appointment from messages. Apt has been made.   Pt understand. All questions answered.     Gaurav Kidd  Medical Assistant

## 2024-11-08 ENCOUNTER — PATIENT MESSAGE (OUTPATIENT)
Dept: SPORTS MEDICINE | Facility: CLINIC | Age: 39
End: 2024-11-08
Payer: COMMERCIAL

## 2024-11-08 ENCOUNTER — TELEPHONE (OUTPATIENT)
Dept: ORTHOPEDICS | Facility: CLINIC | Age: 39
End: 2024-11-08

## 2024-11-08 ENCOUNTER — OFFICE VISIT (OUTPATIENT)
Dept: ORTHOPEDICS | Facility: CLINIC | Age: 39
End: 2024-11-08
Payer: COMMERCIAL

## 2024-11-08 VITALS — WEIGHT: 158 LBS | BODY MASS INDEX: 26.33 KG/M2 | HEIGHT: 65 IN

## 2024-11-08 DIAGNOSIS — S73.191A TEAR OF RIGHT ACETABULAR LABRUM, INITIAL ENCOUNTER: Primary | ICD-10-CM

## 2024-11-08 DIAGNOSIS — M94.251 CHONDROMALACIA OF HIP, RIGHT: ICD-10-CM

## 2024-11-08 DIAGNOSIS — M25.551 RIGHT HIP PAIN: ICD-10-CM

## 2024-11-08 PROCEDURE — 99999 PR PBB SHADOW E&M-EST. PATIENT-LVL III: CPT | Mod: PBBFAC,,, | Performed by: STUDENT IN AN ORGANIZED HEALTH CARE EDUCATION/TRAINING PROGRAM

## 2024-11-08 RX ORDER — TRIAMCINOLONE ACETONIDE 40 MG/ML
40 INJECTION, SUSPENSION INTRA-ARTICULAR; INTRAMUSCULAR
Status: DISCONTINUED | OUTPATIENT
Start: 2024-11-08 | End: 2024-11-08 | Stop reason: HOSPADM

## 2024-11-08 RX ADMIN — TRIAMCINOLONE ACETONIDE 40 MG: 40 INJECTION, SUSPENSION INTRA-ARTICULAR; INTRAMUSCULAR at 11:11

## 2024-11-08 NOTE — PROCEDURES
Large Joint Aspiration/Injection: R hip joint    Date/Time: 11/8/2024 11:20 AM    Performed by: Brent Talamantes MD  Authorized by: Brent Talamantes MD    Consent Done?:  Yes (Verbal)  Indications:  Pain  Site marked: the procedure site was marked    Timeout: prior to procedure the correct patient, procedure, and site was verified    Prep: patient was prepped and draped in usual sterile fashion    Local anesthetic:  Bupivacaine 0.5% without epinephrine and lidocaine 1% without epinephrine    Details:  Needle Size:  22 G  Ultrasonic Guidance for needle placement?: Yes    Images are saved and documented.  Approach:  Anterior  Location:  Hip  Site:  R hip joint  Medications:  40 mg triamcinolone acetonide 40 mg/mL  Patient tolerance:  Patient tolerated the procedure well with no immediate complications     Ultrasound guidance was used for needle localization. Images were saved and stored for documentation. The appropriate structures were visualized. Dynamic visualization of the needle was continuous throughout the procedures and maintained good position.

## 2024-11-08 NOTE — TELEPHONE ENCOUNTER
Spoke with patient regarding return to work letter.  Attached letter to [employer] message and sent to patient.  Patient verbalized understanding that the letter would be on her in2appshart   ----- Message from Arline sent at 11/8/2024  1:20 PM CST -----  Contact: Wilberto Quijano is calling in regards to getting a medical excuse emailed [email or web address] call back at .[phone]       Thanks  NI

## 2024-11-08 NOTE — PATIENT INSTRUCTIONS
Assessment:  Olga Roger is a 38 y.o. female   Chief Complaint   Patient presents with    Right Hip - Pain       No diagnosis found.     Plan:  Ultrasound guided intra articular cortisone injection to the right hip  We discussed the proper protocols after the injection such as no submerging pools, baths tubs, or hot tubs for 24 hr.  Showering is okay today.  We also discussed that blood sugars can be elevated after an injection and asked patient to properly checked her sugars over the next few days and contact their PCP if there are any concerns.  We discussed red flags such as fevers, chills, red, warm, tender joint at the area of injection to please seek medical care immediately.    Follow up as needed    Follow-up: as needed.    Thank you for choosing [facility] and Dr. Brent Talamantes for your orthopedic & sports medicine care. It is our goal to provide you with exceptional care that will help keep you healthy, active, and get you back in the game.    Please do not hesitate to reach out to us via email, phone, or MyChart with any questions, concerns, or feedback.    If you felt that you received exemplary care today, please consider leaving us feedback on [employer] at:  [email or web address]    If you are experiencing pain/discomfort ,or have questions after 5pm and would like to be connected to the [facility] on-call team, please call this number and specify which Sports Medicine provider is treating you: [phone]

## 2024-11-08 NOTE — PROGRESS NOTES
Patient ID: Olga Roger  YOB: 1985  MRN: [id number]    Chief Complaint: Pain of the Right Hip      History of Present Illness: Olga Roger is a 38 y.o. female who presents today with right hip pain.  Patient last seen in clinic on 9/26/2024 for a Tenjet procedure to the right hip.  Patient reports that she did not get the relief that she hoped for from the procedure.  Rates her pain today at a 7/10 and that pain is worse in morning.  Identifies pain as being on the lateral aspect of hip and pain will come and go.  Currently using heat for symptom relief and stationary bike for exercise.  Has appointment with Pain Management in one week.     8/2/2024 Interval History of Present Illness: Olga Roger is a right-hand dominant 38 y.o. female who presents today with right hip pain.  Reports that the pain has been present approximately 2 weeks.  Has history of having a cut present on the inner leg and a wound vac for this. States that her pain is located on the lateral aspect of the right hip and is mainly painful at night, when sleeping.  Rates the pain at a 7/10 and describes as a stabbing, sharp pain.  Has tried heat, Tylenol Arthritis, and lidocaine patches.  Has prescription from her PCP for Tramadol and Methocarbomol.  Reports that she tried the muscle relaxer on Wednesday and the Tramadol last night and that the Tramadol allowed her to get some sleep through the night.       The patient is active in none  Occupation:       Past Medical History:   Past Medical History:   Diagnosis Date    Diabetes mellitus type 2 in nonobese 10/31/2018    A1c >14% as of 11/1/19; Stopped metformin 2/2 diarrhea -> Lantus & atorvastatin on 11/1/19    Hypertriglyceridemia 11/01/2019    TG 1,261 mg/dL, for which we will consider fenofibrate if atorvastatin doesn't causes significant transaminitis (will also consider Lovaza)    Overweight (BMI 25.0-29.9)      History reviewed. No pertinent surgical  history.  Family History   Problem Relation Name Age of Onset    Diabetes Father Manuel Roger     Blindness Father Manuel Roger     No Known Problems Mother      Breast cancer Maternal Grandmother      Breast cancer Maternal Aunt Diatrise Self 45    Cancer Maternal Aunt Diatrise Self         Breast    Depression Brother Corky Walters      Social History     Socioeconomic History    Marital status: Single   Tobacco Use    Smoking status: Never    Smokeless tobacco: Never   Substance and Sexual Activity    Alcohol use: Not Currently     Alcohol/week: 6.0 standard drinks of alcohol     Types: 4 Glasses of wine, 2 Shots of liquor per week    Drug use: Never    Sexual activity: Yes     Partners: Male     Birth control/protection: Condom, I.U.D., None   Social History Narrative    Patient goal(s): Weight 150 lb by 4/30/2020    Motivation: Wants to be more active with her 3 year old and cure dm2 and be around long-term for him     Breakfast: 10%: croissant, egg & cheese or egg & cheese wrap; Powerade    Lunch: Pork chops, gravy & peas; water    Dinner: Fennel cake; lemonade (previously Cokes: 5 cans/d)    Snacks: None    Eating out: 3x/wk    Water (oz/day): 84 oz/d    Physical Activity: walks 1 hr/d at work     Social Drivers of Health     Financial Resource Strain: Patient Declined (8/30/2024)    Received from [facility]    Overall Financial Resource Strain (CARDIA)     Difficulty of Paying Living Expenses: Patient declined   Food Insecurity: Patient Declined (8/30/2024)    Received from [facility]    Hunger Vital Sign     Worried About Running Out of Food in the Last Year: Patient declined     Ran Out of Food in the Last Year: Patient declined   Transportation Needs: Patient Declined (8/30/2024)    Received from [facility]    PRAPARE - Transportation     Lack of Transportation (Medical): Patient declined     Lack of Transportation (Non-Medical): Patient declined   Physical Activity: Patient Declined (8/30/2024)     "Received from [facility]    Exercise Vital Sign     Days of Exercise per Week: Patient declined     Minutes of Exercise per Session: Patient declined   Stress: Patient Declined (8/30/2024)    Received from [facility]    [place] [place] [employer] - Occupational Stress Questionnaire     Feeling of Stress : Patient declined   Housing Stability: High Risk (5/13/2024)    Received from [facility]    Housing Stability Vital Sign     Unable to Pay for Housing in the Last Year: Yes     Homeless in the Last Year: Yes     Medication List with Changes/Refills   Current Medications    AMLODIPINE (NORVASC) 5 MG TABLET    Take 5 mg by mouth.    BD SOY 2ND GEN PEN NEEDLE 32 GAUGE X 5/32" NDLE    use as directed    BLOOD SUGAR DIAGNOSTIC STRP    To check BG 3 times daily, to use with insurance preferred meter    BLOOD-GLUCOSE METER KIT    To check BG 3 times daily, to use with insurance preferred meter    BLOOD-GLUCOSE SENSOR (DEXCOM G7 SENSOR MISC)    3    BLOOD-GLUCOSE SENSOR (DEXCOM G7 SENSOR) PAUL    1 Device by Misc.(Non-Drug; Combo Route) route every 10 days.    DEXCOM G7  MISC    USE  ONCE DAILY    DICLOFENAC 0.15% LIDOCAINE 2.25% PRILOCAINE 2.25% TOPICAL CREAM    Apply topically 2 (two) times daily as needed (as needed for pain).    DOMPERIDONE, BULK, MISC    10 mg by Misc.(Non-Drug; Combo Route) route 3 (three) times daily as needed.    GABAPENTIN (NEURONTIN) 300 MG CAPSULE    Take 1 capsule (300 mg total) by mouth 3 (three) times daily as needed (r hip pain).    HYDROCODONE-ACETAMINOPHEN (NORCO) 5-325 MG PER TABLET    Take 1 tablet by mouth every 12 (twelve) hours as needed for Pain.    INSULIN (LANTUS SOLOSTAR U-100 INSULIN) GLARGINE 100 UNITS/ML SUBQ PEN    Inject 30 Units into the skin every evening.    INSULIN LISPRO (HUMALOG KWIKPEN INSULIN) 100 UNIT/ML PEN    Inject 2-8 Units into the skin every 4 (four) hours. USING CORRECTION SCALE.    LANCETS MISC    To check BG 3 " times daily, to use with insurance preferred meter    MAGNESIUM OXIDE (MAG-OX) 400 MG (241.3 MG MAGNESIUM) TABLET    Take 1 tablet (400 mg total) by mouth once daily.    METHOCARBAMOL (ROBAXIN) 750 MG TAB    Take 1 tablet (750 mg total) by mouth 4 (four) times daily.    METOCLOPRAMIDE HCL (REGLAN) 10 MG TABLET    Take 1 tablet (10 mg total) by mouth 3 (three) times daily before meals.    METOPROLOL SUCCINATE (TOPROL-XL) 50 MG 24 HR TABLET    Take 1 tablet (50 mg total) by mouth once daily.    PREGABALIN (LYRICA) 50 MG CAPSULE    TAKE 1 CAPSULE BY MOUTH THREE TIMES DAILY     Review of patient's allergies indicates:   Allergen Reactions    [place] Hives       Physical Exam:   Body mass index is 26.29 kg/m².    GENERAL: Well appearing, in no acute distress.  HEAD: Normocephalic and atraumatic.  ENT: External ears and nose grossly normal.  EYES: EOMI bilaterally  PULMONARY: Respirations are grossly even and non-labored.  NEURO: Awake, alert, and oriented x 3.  SKIN: No obvious rashes appreciated.  PSYCH: Mood & affect are appropriate.    Detailed MSK exam:     Right hip exam:  -ROM: internal rotation 30, external rotation 60  -ROSETTE negative, FADIR positive, Main negative  -Muscle strength 5/5 flexion, 5/5 extension, 5/5 abduction, 5/5 adduction  -negative log roll  -negative straight leg raise  -TTP: greater trochanter and anterior groin        Imaging:  X-Ray Lumbar Spine 2 Or 3 Views  Narrative: EXAM: XR LUMBAR SPINE 2 OR 3 VIEWS    CLINICAL HISTORY:  Other chronic pain.  Lumbago with sciatica, right side.    TECHNIQUE: Lumbar spine x-ray 3 views.    COMPARISON: None.    FINDINGS:  There is no fracture or malalignment.  The disc spaces are well-preserved.  No degenerative sequela.  Scattered colonic air-fluid levels.  No bowel obstruction.  No stool retention.  IUD.  Right iliac wing bone island.  Impression:  See above.    Finalized on: 10/22/2024 10:53 AM By:  Carlos Linares MD  [facility]# [id number]      2024-10-22  10:55:[id number]    [facility]  X-Ray Hip 2 or 3 views Right with Pelvis when performed  Narrative: EXAM: XR HIP WITH PELVIS WHEN PERFORMED 2 OR 3 VIEWS RIGHT    CLINICAL HISTORY:  Pain in right hip.    TECHNIQUE: Right hip x-ray, 3 views.    COMPARISON STUDY: 07/31/2024.    FINDINGS:  No change.  There is no fracture or dislocation.  Hip joint space is normal.  No congenital sequela.  Right iliac wing bone island.  IUD.  Impression:  Normal right hip x-ray.    Finalized on: 10/22/2024 10:52 AM By:  Carlos Linares MD  [facility]# [id number]      2024-10-22 10:54:45.904    [facility]        Relevant imaging results were reviewed and interpreted by me and per my read shows no acute abnormalities. MRI outside facility showed labral tear and chondromalacia.  This was discussed with the patient and / or family today.     Assessment:  Olga Roger is a 38 y.o. female following up for right hip pain. Tenjet wasn't helpful for lateral hip. Sees pain management soon.   Plan: Steroid injection given today (see separate procedure note for details). We discussed the proper protocols after the injection such as no submerging pools, baths tubs, or hot tubs for 24 hr.  Showering is okay today.  We also discussed that blood sugars can be elevated after an injection and asked patient to properly checked her sugars over the next few days and contact their PCP if there are any concerns.  We discussed red flags such as fevers, chills, red, warm, tender joint at the area of injection to please seek medical care immediately.   Consider hip referral if not improving. Continue conservative management for pain.   Follow up as needed. All questions answered.     Tear of right acetabular labrum, initial encounter  -     Large Joint Aspiration/Injection: R hip joint    Chondromalacia of hip, right  -     Large Joint Aspiration/Injection: R hip joint    Right hip pain  -     Sports Medicine US - Guidance for Needle Placement         Ultrasound guidance was used for  needle localization. Images were saved and stored for documentation. The appropriate structures were visualized. Dynamic visualization of the needle was continuous throughout the procedures and maintained good position.      Electronically signed:  Brent Talamantes MD, MPH  11/08/2024  11:25 AM

## 2024-11-08 NOTE — PROCEDURES
Sports Medicine US - Guidance for Needle Placement    Date/Time: 11/8/2024 11:20 AM    Performed by: Brent Talamantes MD  Authorized by: Brent Talamantes MD  Preparation: Patient was prepped and draped in the usual sterile fashion.  Local anesthesia used: no    Anesthesia:  Local anesthesia used: no    Sedation:  Patient sedated: no    Patient tolerance: patient tolerated the procedure well with no immediate complications  Comments: Ultrasound guidance was used for needle localization. Images were saved and stored for documentation. The appropriate structures were visualized. Dynamic visualization of the needle was continuous throughout the procedures and maintained good position.

## 2024-11-15 ENCOUNTER — OFFICE VISIT (OUTPATIENT)
Dept: PAIN MEDICINE | Facility: CLINIC | Age: 39
End: 2024-11-15
Payer: COMMERCIAL

## 2024-11-15 VITALS
WEIGHT: 173.19 LBS | HEART RATE: 88 BPM | HEIGHT: 65 IN | BODY MASS INDEX: 28.86 KG/M2 | SYSTOLIC BLOOD PRESSURE: 123 MMHG | DIASTOLIC BLOOD PRESSURE: 85 MMHG | RESPIRATION RATE: 17 BRPM

## 2024-11-15 DIAGNOSIS — G89.29 CHRONIC RIGHT-SIDED LOW BACK PAIN WITH RIGHT-SIDED SCIATICA: ICD-10-CM

## 2024-11-15 DIAGNOSIS — M46.1 SACROILIITIS: Primary | ICD-10-CM

## 2024-11-15 DIAGNOSIS — M54.41 CHRONIC RIGHT-SIDED LOW BACK PAIN WITH RIGHT-SIDED SCIATICA: ICD-10-CM

## 2024-11-15 PROCEDURE — 99999 PR PBB SHADOW E&M-EST. PATIENT-LVL V: CPT | Mod: PBBFAC,,, | Performed by: PHYSICAL MEDICINE & REHABILITATION

## 2024-11-15 NOTE — PROGRESS NOTES
New Patient Chronic Pain Note (Initial Visit)    Referring Physician: Mari Mercado PA-C    PCP: Saskia Albert MD    Chief Complaint:   Chief Complaint   Patient presents with    Establish Care        SUBJECTIVE:    Olga Roger is a 38 y.o. female who presents to the clinic for the evaluation of right sided lower back and leg pain.  She was referred by her primary care team for further evaluation and management of this pain.  She has past medical history of depression, hyperlipidemia, hypertension, DM2, gastroparesis, and multiple other medical comorbidities as listed in chart.The pain started a couple of months ago and symptoms have been worsening.The pain is located in the right lumbosacral area and radiates to the right lateral hip and posterior thigh and occasionally into the right groin.  The pain is described as  sharp, stabbing  and is rated as 7/10. The pain is rated with a score of  7/10 on the BEST day and a score of 10/10 on the WORST day.  Symptoms interfere with daily activity. The pain is exacerbated by nothing in particular.  The pain is mitigated by nothing.     Patient denies night fever/night sweats, urinary incontinence, bowel incontinence, significant weight loss, significant motor weakness, and loss of sensations.    Pain Disability Index Review:         11/15/2024     8:43 AM   Last 3 PDI Scores   Pain Disability Index (PDI) 59       Non-Pharmacologic Treatments:  Physical Therapy/Home Exercise: yes, VERY LIMITED BENEFIT DUE TO SEVERE PAIN  Ice/Heat:yes  TENS: no  Acupuncture: no  Massage: no  Chiropractic: no    Other: no      Pain Medications:  - Opioids: Tramadol, Percocet, Norco  - Adjuvant Medications: gabapentin, Lyrica, compound cream, Robaxin  - Anti-Coagulants:  none     report:  Reviewed and consistent with medication use as prescribed.    Pain Procedures:   Tenjet on the right hip   Right hip joint injection under ultrasound      Imaging:    X-ray right hip  10/22/2024:   No change. There is no fracture or dislocation. Hip joint space is normal. No congenital sequela. Right iliac wing bone island. IUD.     X-ray lumbar spine 10/22/2024:   There is no fracture or malalignment. The disc spaces are well-preserved. No degenerative sequela. Scattered colonic air-fluid levels. No bowel obstruction. No stool retention. IUD. Right iliac wing bone island.     MRI right hip 09/05/2024:  BONES: 1 cm benign incidental sclerotic bone island right iliac wing.  No fracture, marrow edema or suspicious bone lesion. No evidence of avascular necrosis.     RIGHT HIP JOINT: Anatomic alignment. Physiologic amount of joint fluid. Chondral thinning and small partial chondral erosions in the superior joint space with a small full-thickness chondral defect and mild subchondral cystic change of the anterior   acetabular roof.     LABRUM: Incidental anterior inferior sublabral sulcus.  Possible small low-grade partial-thickness linear undersurface tear of the anterior labrum (image 17 series 10).  Possible low-grade partial undersurface tear of the superior labrum (coronal images   14-17 series 6).     FEMOROACETABULAR IMPINGEMENT ANATOMY: Femoral head-neck morphology is within normal limits. Head-neck alpha angle is normal measuring 53 degrees. No pincer lesions identified.     MUSCLES, TENDONS AND REMAINING MAJOR SUPPORTING SOFT TISSUE STRUCTURES: Mild abductor tendinosis.  Remaining muscles and tendons are within normal limits.         Past Medical History:   Diagnosis Date    Diabetes mellitus type 2 in nonobese 10/31/2018    A1c >14% as of 11/1/19; Stopped metformin 2/2 diarrhea -> Lantus & atorvastatin on 11/1/19    Hypertriglyceridemia 11/01/2019    TG 1,261 mg/dL, for which we will consider fenofibrate if atorvastatin doesn't causes significant transaminitis (will also consider Lovaza)    Overweight (BMI 25.0-29.9)      History reviewed. No pertinent surgical history.  Social History      Socioeconomic History    Marital status: Single   Tobacco Use    Smoking status: Never    Smokeless tobacco: Never   Substance and Sexual Activity    Alcohol use: Not Currently     Alcohol/week: 6.0 standard drinks of alcohol     Types: 4 Glasses of wine, 2 Shots of liquor per week    Drug use: Never    Sexual activity: Yes     Partners: Male     Birth control/protection: Condom, I.U.D., None   Social History Narrative    Patient goal(s): Weight 150 lb by 4/30/2020    Motivation: Wants to be more active with her 3 year old and cure dm2 and be around long-term for him     Breakfast: 10%: croissant, egg & cheese or egg & cheese wrap; Powerade    Lunch: Pork chops, gravy & peas; water    Dinner: Fennel cake; lemonade (previously Cokes: 5 cans/d)    Snacks: None    Eating out: 3x/wk    Water (oz/day): 84 oz/d    Physical Activity: walks 1 hr/d at work     Social Drivers of Health     Financial Resource Strain: Patient Declined (8/30/2024)    Received from [facility]    Overall Financial Resource Strain (CARDIA)     Difficulty of Paying Living Expenses: Patient declined   Food Insecurity: Patient Declined (8/30/2024)    Received from [facility]    Hunger Vital Sign     Worried About Running Out of Food in the Last Year: Patient declined     Ran Out of Food in the Last Year: Patient declined   Transportation Needs: Patient Declined (8/30/2024)    Received from [facility]    PRAPARE - Transportation     Lack of Transportation (Medical): Patient declined     Lack of Transportation (Non-Medical): Patient declined   Physical Activity: Patient Declined (8/30/2024)    Received from [facility]    Exercise Vital Sign     Days of Exercise per Week: Patient declined     Minutes of Exercise per Session: Patient declined   Stress: Patient Declined (8/30/2024)    Received from [facility]    [place] [employer] - Occupational Stress Questionnaire     Feeling of Stress : Patient declined   Housing  "Stability: High Risk (5/13/2024)    Received from [facility]    Housing Stability Vital Sign     Unable to Pay for Housing in the Last Year: Yes     Homeless in the Last Year: Yes     Family History   Problem Relation Name Age of Onset    Diabetes Father Manuel Roger     Blindness Father Manuel Roger     No Known Problems Mother      Breast cancer Maternal Grandmother      Breast cancer Maternal Aunt Diatrise Self 45    Cancer Maternal Aunt Diatrise Self         Breast    Depression Brother Corky Walters        Review of patient's allergies indicates:   Allergen Reactions    [place] Hives       Current Outpatient Medications   Medication Sig    amLODIPine (NORVASC) 5 MG tablet Take 5 mg by mouth.    BD SOY 2ND GEN PEN NEEDLE 32 gauge x 5/32" Ndle use as directed    blood sugar diagnostic Strp To check BG 3 times daily, to use with insurance preferred meter    blood-glucose sensor (DEXCOM G7 SENSOR MISC) 3    blood-glucose sensor (DEXCOM G7 SENSOR) Hui 1 Device by Misc.(Non-Drug; Combo Route) route every 10 days.    DEXCOM G7  Misc USE  ONCE DAILY    diclofenac 0.15% LIDOcaine 2.25% prilocaine 2.25% topical cream Apply topically 2 (two) times daily as needed (as needed for pain).    DOMPERIDONE, BULK, MISC 10 mg by Misc.(Non-Drug; Combo Route) route 3 (three) times daily as needed.    gabapentin (NEURONTIN) 300 MG capsule Take 1 capsule (300 mg total) by mouth 3 (three) times daily as needed (r hip pain).    HYDROcodone-acetaminophen (NORCO) 5-325 mg per tablet Take 1 tablet by mouth every 12 (twelve) hours as needed for Pain.    insulin (LANTUS SOLOSTAR U-100 INSULIN) glargine 100 units/mL SubQ pen Inject 30 Units into the skin every evening.    insulin lispro (HUMALOG KWIKPEN INSULIN) 100 unit/mL pen Inject 2-8 Units into the skin every 4 (four) hours. USING CORRECTION SCALE.    lancets Misc To check BG 3 times daily, to use with insurance preferred meter    magnesium oxide " "(MAG-OX) 400 mg (241.3 mg magnesium) tablet Take 1 tablet (400 mg total) by mouth once daily.    methocarbamoL (ROBAXIN) 750 MG Tab Take 1 tablet (750 mg total) by mouth 4 (four) times daily.    metoclopramide HCl (REGLAN) 10 MG tablet Take 1 tablet (10 mg total) by mouth 3 (three) times daily before meals.    metoprolol succinate (TOPROL-XL) 50 MG 24 hr tablet Take 1 tablet (50 mg total) by mouth once daily.    pregabalin (LYRICA) 50 MG capsule TAKE 1 CAPSULE BY MOUTH THREE TIMES DAILY    blood-glucose meter kit To check BG 3 times daily, to use with insurance preferred meter     Current Facility-Administered Medications   Medication    levonorgestreL (MIRENA) 20 mcg/24 hours (7 yrs) 52 mg IUD 1 Intra Uterine Device       Review of Systems     GENERAL:  No weight loss, malaise or fevers.  HEENT:   No recent changes in vision or hearing  NECK:  Negative for lumps, no difficulty with swallowing.  RESPIRATORY:  Negative for cough, wheezing or shortness of breath, patient denies any recent URI.  CARDIOVASCULAR:  Negative for chest pain, leg swelling or palpitations.  GI:  Negative for abdominal discomfort, blood in stools or black stools or change in bowel habits.  MUSCULOSKELETAL:  See HPI.  SKIN:  Negative for lesions, rash, and itching.  PSYCH:  No mood disorder or recent psychosocial stressors.  Patients sleep is not disturbed secondary to pain.  HEMATOLOGY/LYMPHOLOGY:  Negative for prolonged bleeding, bruising easily or swollen nodes.  Patient is not currently taking any anti-coagulants  NEURO:   No history of headaches, syncope, paralysis, seizures or tremors.  All other reviewed and negative other than HPI.    OBJECTIVE:    /85   Pulse 88   Resp 17   Ht 5' 5" (1.651 m)   Wt 78.6 kg (173 lb 3.2 oz)   BMI 28.82 kg/m²         Physical Exam    GENERAL: Well appearing, in no acute distress, alert and oriented x3.  PSYCH:  Mood and affect appropriate.  SKIN: Skin color, texture, turgor normal, no rashes or " lesions.  HEAD/FACE:  Normocephalic, atraumatic. Cranial nerves grossly intact.  CV: RRR with palpation of the radial artery.  PULM: No evidence of respiratory difficulty, symmetric chest rise.  GI:  Soft and non-tender.  BACK: Straight leg raising in the sitting and supine positions is equivocal to radicular pain. No pain to palpation over the facet joints of the lumbar spine and lumbar paraspinals.  Limited range of motion secondary to pain reproduction. Directional preference:  Pain with both flexion and extension  EXTREMITIES:  No deformities, edema, or skin discoloration. Good capillary refill.  MUSCULOSKELETAL:  Hip and knee provocative maneuvers are unremarkable.  There is moderate pain with palpation over the sacroiliac joint on the right.  FABERs test is positive on the right.  Gaenslen's positive on the right.  Sacral thrust positive on the right.  FADIRs test is equivocal.   Bilateral upper and lower extremity strength is normal and symmetric.  No atrophy or tone abnormalities are noted.  NEURO: Bilateral upper and lower extremity coordination and muscle stretch reflexes are physiologic and symmetric.  Plantar response are downgoing. No clonus.  No loss of sensation is noted.  GAIT:  Slow, antalgic.      LABS:  Lab Results   Component Value Date    WBC 6.79 06/19/2024    HGB 10.7 (L) 06/19/2024    HCT 36.1 (L) 06/19/2024    MCV 81 (L) 06/19/2024     06/19/2024       CMP  Sodium   Date Value Ref Range Status   06/19/2024 139 136 - 145 mmol/L Final     Potassium   Date Value Ref Range Status   06/19/2024 4.1 3.5 - 5.1 mmol/L Final     Chloride   Date Value Ref Range Status   06/19/2024 107 95 - 110 mmol/L Final     CO2   Date Value Ref Range Status   06/19/2024 23 23 - 29 mmol/L Final     Glucose   Date Value Ref Range Status   06/19/2024 78 70 - 110 mg/dL Final     BUN   Date Value Ref Range Status   06/19/2024 17 6 - 20 mg/dL Final     Creatinine   Date Value Ref Range Status   06/19/2024 1.3 0.5 -  1.4 mg/dL Final     Calcium   Date Value Ref Range Status   06/19/2024 10.5 8.7 - 10.5 mg/dL Final     Total Protein   Date Value Ref Range Status   06/19/2024 7.6 6.0 - 8.4 g/dL Final     Albumin   Date Value Ref Range Status   06/19/2024 3.7 3.5 - 5.2 g/dL Final     Total Bilirubin   Date Value Ref Range Status   06/19/2024 0.2 0.1 - 1.0 mg/dL Final     Comment:     For infants and newborns, interpretation of results should be based  on gestational age, weight and in agreement with clinical  observations.    Premature Infant recommended reference ranges:  Up to 24 hours.............<8.0 mg/dL  Up to 48 hours............<12.0 mg/dL  3-5 days..................<15.0 mg/dL  6-29 days.................<15.0 mg/dL       Alkaline Phosphatase   Date Value Ref Range Status   06/19/2024 91 55 - 135 U/L Final     AST   Date Value Ref Range Status   06/19/2024 13 10 - 40 U/L Final     ALT   Date Value Ref Range Status   06/19/2024 6 (L) 10 - 44 U/L Final     Anion Gap   Date Value Ref Range Status   06/19/2024 9 8 - 16 mmol/L Final     eGFR if    Date Value Ref Range Status   09/23/2021 >60.0 >60 mL/min/1.73 m^2 Final     eGFR if non    Date Value Ref Range Status   09/23/2021 >60.0 >60 mL/min/1.73 m^2 Final     Comment:     Calculation used to obtain the estimated glomerular filtration  rate (eGFR) is the CKD-EPI equation.          Lab Results   Component Value Date    HGBA1C 5.6 06/19/2024             ASSESSMENT: 38 y.o. year old female with lower back and right hip pain, consistent with     1. Sacroiliitis  Case Request-RAD/Other Procedure Area: Right SIJ Injection      2. Chronic right-sided low back pain with right-sided sciatica  Ambulatory referral/consult to Pain Clinic            PLAN:   - Interventions:  Scheduled for right-sided SI joint injection under fluoroscopy for diagnostic and therapeutic purposes.    - Anticoagulation use:  None      - Medications: I have stressed the  importance of physical activity and a home exercise plan to help with pain and improve health. and Patient can continue with medications for now since they are providing benefits, using them appropriately, and without side effects.         - Therapy:  Unable tolerate formal PT at this time due to severe pain    - Psychological:  Discussed coping mechanisms to help address chronic pain issues    - Labs:  Reviewed    - Imaging: Reviewed available imaging with patient and answered any questions they had regarding study.    - Consults/Referrals:  None at this time    - Records:  Reviewed/Obtain old records from outside physicians and imaging    - Follow up visit: return to clinic in 4-6 weeks post procedure or as needed    - Counseled patient regarding the importance of activity modification and physical therapy    - This condition does not require this patient to take time off of work, and the primary goal of our Pain Management services is to improve the patient's functional capacity.    - Patient Questions: Answered all of the patient's questions regarding diagnosis, therapy, and treatment        The above plan and management options were discussed at length with patient. Patient is in agreement with the above and verbalized understanding.    I discussed the goals of interventional chronic pain management with the patient on today's visit.  I explained the utility of injections for diagnostic and therapeutic purposes.  We discussed a multimodal approach to pain including treating the patient's given worst pain at any given time.  We will use a systematic approach to addressing pain.  We will also adopt a multimodal approach that includes injections, adjuvant medications, physical therapy, at times psychiatry.  There may be a limited role for opioid use intermittently in the treatment of pain, more particularly for acute pain although no one approach can be used as a sole treatment modality.    I emphasized the  importance of regular exercise, core strengthening and stretching, diet and weight loss as a cornerstone of long-term pain management.      Fam Herrera MD  Interventional Pain Management  [facility]    Disclaimer:  This note was prepared using voice recognition system and is likely to have sound alike errors that may have been overlooked even after proof reading.  Please call me with any questions

## 2024-11-15 NOTE — LETTER
November 15, 2024    Olga Roger  [address]  [place] [address]             [facility] Interventional [facility]  Pain Medicine  [address] [facility]  [place] [address]  Phone: [phone]  Fax: [phone]   November 15, 2024     Patient: Olga Roger   YOB: 1985   Date of Visit: 11/15/2024       To Whom it May Concern:    Olga Roger was seen in my clinic on 11/15/2024. She may return to work on 11/18/2024 .    If you have any questions or concerns, please don't hesitate to call.    Sincerely,         Fam Herrera MD

## 2024-11-22 NOTE — PRE-PROCEDURE INSTRUCTIONS
Spoke with patient regarding procedure scheduled on 12.3     Arrival time 0730     Has patient been sick with fever or on antibiotics within the last 7 days? No     Does the patient have any open wounds, sores or rashes? No     Does the patient have any recent fractures? no     Has patient received a vaccination within the last 7 days? No      Has the patient stopped all medications as directed? na     Does patient have a pacemaker, defibrillator, or implantable stimulator? No     Does the patient have a ride to and from procedure and someone reliable to remain with patient?  MOTHER      Is the patient diabetic? YES     Does the patient have sleep apnea? Or use O2 at home? no     Is the patient receiving sedation? yes     Is the patient instructed to remain NPO beginning at midnight the night before their procedure? yes     Procedure location confirmed with patient? Yes     Covid- Denies signs/symptoms. Instructed to notify PAT/MD if any changes.

## 2024-12-03 ENCOUNTER — HOSPITAL ENCOUNTER (OUTPATIENT)
Facility: HOSPITAL | Age: 39
Discharge: HOME | End: 2024-12-03
Attending: PHYSICAL MEDICINE & REHABILITATION | Admitting: PHYSICAL MEDICINE & REHABILITATION
Payer: COMMERCIAL

## 2024-12-03 VITALS
RESPIRATION RATE: 15 BRPM | SYSTOLIC BLOOD PRESSURE: 128 MMHG | WEIGHT: 181.19 LBS | HEART RATE: 90 BPM | BODY MASS INDEX: 30.19 KG/M2 | TEMPERATURE: 97 F | DIASTOLIC BLOOD PRESSURE: 84 MMHG | HEIGHT: 65 IN | OXYGEN SATURATION: 100 %

## 2024-12-03 DIAGNOSIS — M46.1 SACROILIITIS: Primary | ICD-10-CM

## 2024-12-03 LAB — POCT GLUCOSE: 177 MG/DL (ref 70–110)

## 2024-12-03 PROCEDURE — 63600175 PHARM REV CODE 636 W HCPCS: Performed by: PHYSICAL MEDICINE & REHABILITATION

## 2024-12-03 PROCEDURE — 25500020 PHARM REV CODE 255: Performed by: PHYSICAL MEDICINE & REHABILITATION

## 2024-12-03 PROCEDURE — 27096 INJECT SACROILIAC JOINT: CPT | Mod: RT,,, | Performed by: PHYSICAL MEDICINE & REHABILITATION

## 2024-12-03 PROCEDURE — 27096 INJECT SACROILIAC JOINT: CPT | Mod: RT | Performed by: PHYSICAL MEDICINE & REHABILITATION

## 2024-12-03 PROCEDURE — 82962 GLUCOSE BLOOD TEST: CPT | Performed by: PHYSICAL MEDICINE & REHABILITATION

## 2024-12-03 RX ORDER — METHYLPREDNISOLONE ACETATE 40 MG/ML
INJECTION, SUSPENSION INTRA-ARTICULAR; INTRALESIONAL; INTRAMUSCULAR; SOFT TISSUE
Status: DISCONTINUED | OUTPATIENT
Start: 2024-12-03 | End: 2024-12-03 | Stop reason: HOSPADM

## 2024-12-03 RX ORDER — BUPIVACAINE HYDROCHLORIDE 2.5 MG/ML
INJECTION, SOLUTION EPIDURAL; INFILTRATION; INTRACAUDAL
Status: DISCONTINUED | OUTPATIENT
Start: 2024-12-03 | End: 2024-12-03 | Stop reason: HOSPADM

## 2024-12-03 RX ORDER — MIDAZOLAM HYDROCHLORIDE 1 MG/ML
INJECTION, SOLUTION INTRAMUSCULAR; INTRAVENOUS
Status: DISCONTINUED | OUTPATIENT
Start: 2024-12-03 | End: 2024-12-03 | Stop reason: HOSPADM

## 2024-12-03 RX ORDER — ONDANSETRON HYDROCHLORIDE 2 MG/ML
4 INJECTION, SOLUTION INTRAVENOUS ONCE AS NEEDED
Status: DISCONTINUED | OUTPATIENT
Start: 2024-12-03 | End: 2024-12-03 | Stop reason: HOSPADM

## 2024-12-03 RX ORDER — FENTANYL CITRATE 50 UG/ML
INJECTION, SOLUTION INTRAMUSCULAR; INTRAVENOUS
Status: DISCONTINUED | OUTPATIENT
Start: 2024-12-03 | End: 2024-12-03 | Stop reason: HOSPADM

## 2024-12-03 NOTE — DISCHARGE INSTRUCTIONS

## 2024-12-03 NOTE — OP NOTE
Sacroiliac Joint Injection under Fluoroscopy    Date of procedure 12/03/2024    Time-out taken to identify patient and procedure side prior to starting the procedure.                                                                 PROCEDURE:  right sacroiliac joint injection under fluoroscopy.    REASON FOR PROCEDURE: right Sacroiliitis [M46.1]    PHYSICIAN: Fam Herrera MD    ASSISTANTS: None    MEDICATIONS INJECTED:  Depo-Medrol 40mg and 4 mL Bupivacaine 0.25%    LOCAL ANESTHETIC USED: Xylocaine 1% 5mL    SEDATION MEDICATIONS: Conscious sedation provided by M.D    The patient was monitored with continuous pulse oximetry, EKG, and intermittent blood pressure monitors, immediately prior to administration of sedation.  The patient was hemodynamically stable throughout the entire process was responsive to voice, and breathing spontaneously.  Supplemental O2 was provided at 2L/min via nasal cannula.  Patient was comfortable for the duration of the procedure.     There was a total of 2mg IV Midazolam and 50mcg Fentanyl titrated for the procedure    Total sedation time was >10minutes and <20minutes      ESTIMATED BLOOD LOSS:  None.    COMPLICATIONS:  None.    TECHNIQUE:   Laying in the prone position, the patient was prepped and draped in the usual sterile fashion using ChloraPrep and fenestrated drape.  The area was determined under fluoroscopy.  Local Xylocaine was injected by raising a wheel and going down to the periosteum using a 27-gauge hypodermic needle.  The 3.5 inch 22-gauge spinal needle was introduce into the right sacroiliac joint.  Negative pressure applied to confirm no intravascular placement.  Omnipaque was injected to confirm placement and to confirm that there was no vascular runoff.  The medication was then injected slowly.  The patient tolerated the procedure well.      The patient was monitored for approximately 30 minutes after the procedure.  Patient was given post procedure and discharge  instructions to follow at home.  We will see the patient back in two weeks or the patient may call to inform of status. The patient was discharged in a stable condition

## 2024-12-03 NOTE — H&P
"HPI  Patient presenting for Procedure(s) (LRB):  Right SIJ Injection (Right)     No health changes since previous encounter    Past Medical History:   Diagnosis Date    Diabetes mellitus type 2 in nonobese 10/31/2018    A1c >14% as of 11/1/19; Stopped metformin 2/2 diarrhea -> Lantus & atorvastatin on 11/1/19    Hypertriglyceridemia 11/01/2019    TG 1,261 mg/dL, for which we will consider fenofibrate if atorvastatin doesn't causes significant transaminitis (will also consider Lovaza)    Overweight (BMI 25.0-29.9)      History reviewed. No pertinent surgical history.  Review of patient's allergies indicates:   Allergen Reactions    [place] Hives        No current facility-administered medications on file prior to encounter.     Current Outpatient Medications on File Prior to Encounter   Medication Sig Dispense Refill    amLODIPine (NORVASC) 5 MG tablet Take 5 mg by mouth.      gabapentin (NEURONTIN) 300 MG capsule Take 1 capsule (300 mg total) by mouth 3 (three) times daily as needed (r hip pain). 90 capsule 11    HYDROcodone-acetaminophen (NORCO) 5-325 mg per tablet Take 1 tablet by mouth every 12 (twelve) hours as needed for Pain. 60 tablet 0    magnesium oxide (MAG-OX) 400 mg (241.3 mg magnesium) tablet Take 1 tablet (400 mg total) by mouth once daily. 30 tablet 2    metoprolol succinate (TOPROL-XL) 50 MG 24 hr tablet Take 1 tablet (50 mg total) by mouth once daily. 90 tablet 3    pregabalin (LYRICA) 50 MG capsule TAKE 1 CAPSULE BY MOUTH THREE TIMES DAILY 90 capsule 2    BD SOY 2ND GEN PEN NEEDLE 32 gauge x 5/32" Ndle use as directed      blood sugar diagnostic Strp To check BG 3 times daily, to use with insurance preferred meter 100 each 0    blood-glucose meter kit To check BG 3 times daily, to use with insurance preferred meter 100 each 1    blood-glucose sensor (DEXCOM G7 SENSOR MISC) 3      blood-glucose sensor (DEXCOM G7 SENSOR) Hui 1 Device by Misc.(Non-Drug; Combo Route) route every 10 days. 3 each 11 " "   DEXCOM G7  Misc USE  ONCE DAILY 1 each 0    diclofenac 0.15% LIDOcaine 2.25% prilocaine 2.25% topical cream Apply topically 2 (two) times daily as needed (as needed for pain). 480 g 1    DOMPERIDONE, BULK, MISC 10 mg by Misc.(Non-Drug; Combo Route) route 3 (three) times daily as needed.      insulin (LANTUS SOLOSTAR U-100 INSULIN) glargine 100 units/mL SubQ pen Inject 30 Units into the skin every evening.      insulin lispro (HUMALOG KWIKPEN INSULIN) 100 unit/mL pen Inject 2-8 Units into the skin every 4 (four) hours. USING CORRECTION SCALE. 14.4 mL 11    lancets Misc To check BG 3 times daily, to use with insurance preferred meter 100 each 1    methocarbamoL (ROBAXIN) 750 MG Tab Take 1 tablet (750 mg total) by mouth 4 (four) times daily. 40 tablet 1    metoclopramide HCl (REGLAN) 10 MG tablet Take 1 tablet (10 mg total) by mouth 3 (three) times daily before meals. 90 tablet 3        PMHx, PSHx, Allergies, Medications reviewed in epic    ROS negative except pain complaints in HPI    OBJECTIVE:    BP (!) 166/97 (BP Location: Right arm, Patient Position: Sitting)   Pulse 100   Temp 97.4 °F (36.3 °C) (Temporal)   Resp 19   Ht 5' 5" (1.651 m)   Wt 82.2 kg (181 lb 3.5 oz)   SpO2 98%   Breastfeeding No   BMI 30.16 kg/m²     PHYSICAL EXAMINATION:    GENERAL: Well appearing, in no acute distress, alert and oriented x3.  PSYCH:  Mood and affect appropriate.  SKIN: Skin color, texture, turgor normal, no rashes or lesions which will impact the procedure.  CV: RRR with palpation of the radial artery.  PULM: No evidence of respiratory difficulty, symmetric chest rise. Clear to auscultation.  NEURO: Cranial nerves grossly intact.    Plan:    Proceed with procedure as planned Procedure(s) (LRB):  Right SIJ Injection (Right)    Fam Herrera MD  12/03/2024            "

## 2024-12-03 NOTE — DISCHARGE SUMMARY
"Discharge Note  Short Stay      SUMMARY     Admit Date: 12/3/2024    Attending Physician: Fam Herrera MD        Discharge Physician: Fam Herrera MD        Discharge Date: 12/3/2024 8:54 AM    Procedure(s) (LRB):  Right SIJ Injection (Right)    Final Diagnosis: Sacroiliitis [M46.1]    Disposition: Home or self care    Patient Instructions:   Current Discharge Medication List        CONTINUE these medications which have NOT CHANGED    Details   amLODIPine (NORVASC) 5 MG tablet Take 5 mg by mouth.      gabapentin (NEURONTIN) 300 MG capsule Take 1 capsule (300 mg total) by mouth 3 (three) times daily as needed (r hip pain).  Qty: 90 capsule, Refills: 11    Associated Diagnoses: Right hip pain; Pain of right hip      HYDROcodone-acetaminophen (NORCO) 5-325 mg per tablet Take 1 tablet by mouth every 12 (twelve) hours as needed for Pain.  Qty: 60 tablet, Refills: 0    Comments: Quantity prescribed more than 7 day supply? Yes, quantity medically necessary  Associated Diagnoses: Right hip pain; Pain of right hip      magnesium oxide (MAG-OX) 400 mg (241.3 mg magnesium) tablet Take 1 tablet (400 mg total) by mouth once daily.  Qty: 30 tablet, Refills: 2    Associated Diagnoses: Hypomagnesemia      metoprolol succinate (TOPROL-XL) 50 MG 24 hr tablet Take 1 tablet (50 mg total) by mouth once daily.  Qty: 90 tablet, Refills: 3    Comments: .  Associated Diagnoses: Tachycardia      pregabalin (LYRICA) 50 MG capsule TAKE 1 CAPSULE BY MOUTH THREE TIMES DAILY  Qty: 90 capsule, Refills: 2    Associated Diagnoses: Right hip pain; Chronic right-sided low back pain with right-sided sciatica      BD SOY 2ND GEN PEN NEEDLE 32 gauge x 5/32" Ndle use as directed      blood sugar diagnostic Strp To check BG 3 times daily, to use with insurance preferred meter  Qty: 100 each, Refills: 0    Associated Diagnoses: Uncontrolled type 2 diabetes mellitus with hyperglycemia      blood-glucose meter kit To check BG 3 times daily, to use " with insurance preferred meter  Qty: 100 each, Refills: 1    Associated Diagnoses: Uncontrolled type 2 diabetes mellitus with hyperglycemia      !! blood-glucose sensor (DEXCOM G7 SENSOR MISC) 3      !! blood-glucose sensor (DEXCOM G7 SENSOR) Hui 1 Device by Misc.(Non-Drug; Combo Route) route every 10 days.  Qty: 3 each, Refills: 11    Associated Diagnoses: Type 2 diabetes mellitus with hyperglycemia, without long-term current use of insulin      DEXCOM G7  Misc USE  ONCE DAILY  Qty: 1 each, Refills: 0    Associated Diagnoses: Type 2 diabetes mellitus with hyperglycemia, without long-term current use of insulin      diclofenac 0.15% LIDOcaine 2.25% prilocaine 2.25% topical cream Apply topically 2 (two) times daily as needed (as needed for pain).  Qty: 480 g, Refills: 1    Associated Diagnoses: Right hip pain; Pain of right hip      DOMPERIDONE, BULK, MISC 10 mg by Misc.(Non-Drug; Combo Route) route 3 (three) times daily as needed.      insulin (LANTUS SOLOSTAR U-100 INSULIN) glargine 100 units/mL SubQ pen Inject 30 Units into the skin every evening.    Associated Diagnoses: Uncontrolled type 2 diabetes mellitus with hyperglycemia      insulin lispro (HUMALOG KWIKPEN INSULIN) 100 unit/mL pen Inject 2-8 Units into the skin every 4 (four) hours. USING CORRECTION SCALE.  Qty: 14.4 mL, Refills: 11    Associated Diagnoses: Type 2 diabetes mellitus with hyperglycemia, without long-term current use of insulin      lancets Misc To check BG 3 times daily, to use with insurance preferred meter  Qty: 100 each, Refills: 1    Associated Diagnoses: Uncontrolled type 2 diabetes mellitus with hyperglycemia      methocarbamoL (ROBAXIN) 750 MG Tab Take 1 tablet (750 mg total) by mouth 4 (four) times daily.  Qty: 40 tablet, Refills: 1    Associated Diagnoses: Right hip pain      metoclopramide HCl (REGLAN) 10 MG tablet Take 1 tablet (10 mg total) by mouth 3 (three) times daily before meals.  Qty: 90 tablet, Refills: 3     Associated Diagnoses: Diabetic gastroparesis       !! - Potential duplicate medications found. Please discuss with provider.              Discharge Diagnosis: Sacroiliitis [M46.1]  Condition on Discharge: Stable with no complications to procedure   Diet on Discharge: Same as before.  Activity: as per instruction sheet.  Discharge to: Home with a responsible adult.  Follow up: 2-4 weeks       Please call the office at [phone] if you experience any weakness or loss of sensation, fever > 101.5, pain uncontrolled with oral medications, persistent nausea/vomiting/or diarrhea, redness or drainage from the incisions, or any other worrisome concerns. If physician on call was not reached or could not communicate with our office for any reason please go to the nearest emergency department

## 2024-12-18 ENCOUNTER — PATIENT OUTREACH (OUTPATIENT)
Dept: ADMINISTRATIVE | Facility: HOSPITAL | Age: 39
End: 2024-12-18
Payer: COMMERCIAL

## 2024-12-18 NOTE — PROGRESS NOTES
VBC Program activation, spoke with pt who confirms appt with PCP tomorrow, Pt informed to go lab after PCP visit.

## 2024-12-19 ENCOUNTER — OFFICE VISIT (OUTPATIENT)
Dept: FAMILY MEDICINE | Facility: CLINIC | Age: 39
End: 2024-12-19
Payer: COMMERCIAL

## 2024-12-19 ENCOUNTER — LAB VISIT (OUTPATIENT)
Dept: LAB | Facility: HOSPITAL | Age: 39
End: 2024-12-19
Attending: STUDENT IN AN ORGANIZED HEALTH CARE EDUCATION/TRAINING PROGRAM
Payer: COMMERCIAL

## 2024-12-19 VITALS
HEIGHT: 65 IN | HEART RATE: 85 BPM | BODY MASS INDEX: 30.96 KG/M2 | WEIGHT: 185.81 LBS | SYSTOLIC BLOOD PRESSURE: 132 MMHG | DIASTOLIC BLOOD PRESSURE: 87 MMHG | TEMPERATURE: 98 F | RESPIRATION RATE: 18 BRPM

## 2024-12-19 DIAGNOSIS — E08.65 DIABETES MELLITUS DUE TO UNDERLYING CONDITION, UNCONTROLLED, WITH HYPERGLYCEMIA: ICD-10-CM

## 2024-12-19 DIAGNOSIS — E66.9 OBESITY (BMI 30-39.9): ICD-10-CM

## 2024-12-19 DIAGNOSIS — K31.84 DIABETIC GASTROPARESIS: ICD-10-CM

## 2024-12-19 DIAGNOSIS — E11.9 DIABETES MELLITUS TYPE 2 IN NONOBESE: Primary | ICD-10-CM

## 2024-12-19 DIAGNOSIS — E11.49 TYPE 2 DIABETES MELLITUS WITH OTHER NEUROLOGIC COMPLICATION, WITHOUT LONG-TERM CURRENT USE OF INSULIN: ICD-10-CM

## 2024-12-19 DIAGNOSIS — E11.65 UNCONTROLLED TYPE 2 DIABETES MELLITUS WITH HYPERGLYCEMIA: ICD-10-CM

## 2024-12-19 DIAGNOSIS — D64.9 ANEMIA, UNSPECIFIED TYPE: ICD-10-CM

## 2024-12-19 DIAGNOSIS — E11.43 DIABETIC GASTROPARESIS: ICD-10-CM

## 2024-12-19 LAB
ALBUMIN/CREAT UR: NORMAL UG/MG (ref 0–30)
ANION GAP SERPL CALC-SCNC: 9 MMOL/L (ref 8–16)
BUN SERPL-MCNC: 23 MG/DL (ref 6–20)
CALCIUM SERPL-MCNC: 9.1 MG/DL (ref 8.7–10.5)
CHLORIDE SERPL-SCNC: 109 MMOL/L (ref 95–110)
CO2 SERPL-SCNC: 22 MMOL/L (ref 23–29)
CREAT SERPL-MCNC: 1.5 MG/DL (ref 0.5–1.4)
CREAT UR-MCNC: 64 MG/DL (ref 15–325)
ERYTHROCYTE [DISTWIDTH] IN BLOOD BY AUTOMATED COUNT: 12.3 % (ref 11.5–14.5)
EST. GFR  (NO RACE VARIABLE): 45.5 ML/MIN/1.73 M^2
ESTIMATED AVG GLUCOSE: 134 MG/DL (ref 68–131)
GLUCOSE SERPL-MCNC: 146 MG/DL (ref 70–110)
HBA1C MFR BLD: 6.3 % (ref 4–5.6)
HCT VFR BLD AUTO: 34.7 % (ref 37–48.5)
HGB BLD-MCNC: 9.8 G/DL (ref 12–16)
IRON SERPL-MCNC: 73 UG/DL (ref 30–160)
MCH RBC QN AUTO: 23.3 PG (ref 27–31)
MCHC RBC AUTO-ENTMCNC: 28.2 G/DL (ref 32–36)
MCV RBC AUTO: 83 FL (ref 82–98)
MICROALBUMIN UR DL<=1MG/L-MCNC: <5 UG/ML
PLATELET # BLD AUTO: 393 K/UL (ref 150–450)
PMV BLD AUTO: 10.9 FL (ref 9.2–12.9)
POTASSIUM SERPL-SCNC: 4.4 MMOL/L (ref 3.5–5.1)
RBC # BLD AUTO: 4.2 M/UL (ref 4–5.4)
SATURATED IRON: 21 % (ref 20–50)
SODIUM SERPL-SCNC: 140 MMOL/L (ref 136–145)
TOTAL IRON BINDING CAPACITY: 348 UG/DL (ref 250–450)
TRANSFERRIN SERPL-MCNC: 235 MG/DL (ref 200–375)
TSH SERPL DL<=0.005 MIU/L-ACNC: 3.25 UIU/ML (ref 0.4–4)
WBC # BLD AUTO: 7.1 K/UL (ref 3.9–12.7)

## 2024-12-19 PROCEDURE — 36415 COLL VENOUS BLD VENIPUNCTURE: CPT | Mod: PO | Performed by: STUDENT IN AN ORGANIZED HEALTH CARE EDUCATION/TRAINING PROGRAM

## 2024-12-19 PROCEDURE — G2211 COMPLEX E/M VISIT ADD ON: HCPCS | Mod: S$GLB,,, | Performed by: STUDENT IN AN ORGANIZED HEALTH CARE EDUCATION/TRAINING PROGRAM

## 2024-12-19 PROCEDURE — 3008F BODY MASS INDEX DOCD: CPT | Mod: CPTII,S$GLB,, | Performed by: STUDENT IN AN ORGANIZED HEALTH CARE EDUCATION/TRAINING PROGRAM

## 2024-12-19 PROCEDURE — 83540 ASSAY OF IRON: CPT | Performed by: STUDENT IN AN ORGANIZED HEALTH CARE EDUCATION/TRAINING PROGRAM

## 2024-12-19 PROCEDURE — 84443 ASSAY THYROID STIM HORMONE: CPT | Performed by: STUDENT IN AN ORGANIZED HEALTH CARE EDUCATION/TRAINING PROGRAM

## 2024-12-19 PROCEDURE — 83036 HEMOGLOBIN GLYCOSYLATED A1C: CPT | Performed by: STUDENT IN AN ORGANIZED HEALTH CARE EDUCATION/TRAINING PROGRAM

## 2024-12-19 PROCEDURE — 82043 UR ALBUMIN QUANTITATIVE: CPT | Performed by: STUDENT IN AN ORGANIZED HEALTH CARE EDUCATION/TRAINING PROGRAM

## 2024-12-19 PROCEDURE — 3079F DIAST BP 80-89 MM HG: CPT | Mod: CPTII,S$GLB,, | Performed by: STUDENT IN AN ORGANIZED HEALTH CARE EDUCATION/TRAINING PROGRAM

## 2024-12-19 PROCEDURE — 80048 BASIC METABOLIC PNL TOTAL CA: CPT | Performed by: STUDENT IN AN ORGANIZED HEALTH CARE EDUCATION/TRAINING PROGRAM

## 2024-12-19 PROCEDURE — 3075F SYST BP GE 130 - 139MM HG: CPT | Mod: CPTII,S$GLB,, | Performed by: STUDENT IN AN ORGANIZED HEALTH CARE EDUCATION/TRAINING PROGRAM

## 2024-12-19 PROCEDURE — 99214 OFFICE O/P EST MOD 30 MIN: CPT | Mod: S$GLB,,, | Performed by: STUDENT IN AN ORGANIZED HEALTH CARE EDUCATION/TRAINING PROGRAM

## 2024-12-19 PROCEDURE — 99999 PR PBB SHADOW E&M-EST. PATIENT-LVL V: CPT | Mod: PBBFAC,,, | Performed by: STUDENT IN AN ORGANIZED HEALTH CARE EDUCATION/TRAINING PROGRAM

## 2024-12-19 PROCEDURE — 85027 COMPLETE CBC AUTOMATED: CPT | Performed by: STUDENT IN AN ORGANIZED HEALTH CARE EDUCATION/TRAINING PROGRAM

## 2024-12-19 PROCEDURE — 3044F HG A1C LEVEL LT 7.0%: CPT | Mod: CPTII,S$GLB,, | Performed by: STUDENT IN AN ORGANIZED HEALTH CARE EDUCATION/TRAINING PROGRAM

## 2024-12-19 PROCEDURE — 1160F RVW MEDS BY RX/DR IN RCRD: CPT | Mod: CPTII,S$GLB,, | Performed by: STUDENT IN AN ORGANIZED HEALTH CARE EDUCATION/TRAINING PROGRAM

## 2024-12-19 PROCEDURE — 1159F MED LIST DOCD IN RCRD: CPT | Mod: CPTII,S$GLB,, | Performed by: STUDENT IN AN ORGANIZED HEALTH CARE EDUCATION/TRAINING PROGRAM

## 2024-12-19 RX ORDER — OMEPRAZOLE 40 MG/1
40 CAPSULE, DELAYED RELEASE ORAL
COMMUNITY
Start: 2024-11-13

## 2024-12-19 RX ORDER — BLOOD-GLUCOSE SENSOR
1 EACH MISCELLANEOUS
Qty: 1 EACH | Refills: 11 | Status: SHIPPED | OUTPATIENT
Start: 2024-12-19 | End: 2025-12-19

## 2024-12-19 NOTE — PROGRESS NOTES
Problem List Items Addressed This Visit          Endocrine    Diabetes mellitus type 2 in nonobese - Primary    Overview     Well controlled   Lab Results   Component Value Date    HGBA1C 5.6 06/19/2024       Hypoglycemic Events: seldom     -current meds:   Diabetes Medications               insulin (LANTUS SOLOSTAR U-100 INSULIN) glargine 100 units/mL SubQ pen Inject 30 Units into the skin every evening.    insulin lispro (HUMALOG KWIKPEN INSULIN) 100 unit/mL pen Inject 2-8 Units into the skin every 4 (four) hours. USING CORRECTION SCALE.          -on statin: will discuss at f/u  -on ACE-I/ARB:   Hypertension Medications               amLODIPine (NORVASC) 5 MG tablet Take 5 mg by mouth.    metoprolol succinate (TOPROL-XL) 50 MG 24 hr tablet Take 1 tablet (50 mg total) by mouth once daily.          -counseling provided on importance of diabetic diet and medication compliance in order to treat diabetes  -discussed diabetes disease course and potential complications  Follow up 3 months            Relevant Medications    blood-glucose sensor (DEXCOM G7 SENSOR) Hui    Obesity (BMI 30-39.9)    Overview     Wt Readings from Last 3 Encounters:   12/19/24 0857 84.3 kg (185 lb 12.8 oz)   12/03/24 0806 82.2 kg (181 lb 3.5 oz)   11/15/24 0841 78.6 kg (173 lb 3.2 oz)       Diabetes Medications               insulin (LANTUS SOLOSTAR U-100 INSULIN) glargine 100 units/mL SubQ pen Inject 30 Units into the skin every evening.    insulin lispro (HUMALOG KWIKPEN INSULIN) 100 unit/mL pen Inject 2-8 Units into the skin every 4 (four) hours. USING CORRECTION SCALE.            General weight loss/lifestyle modification strategies discussed: limit sugary drinks, exercise 3-5x per week  Informal exercise measures discussed, e.g. taking stairs instead of elevator.                     Assessment & Plan    TYPE 2 DIABETES MELLITUS:  - Reviewed current insulin regimen, considering adjustment of nighttime Lantus dose based on patient's eating  patterns and nocturnal hypoglycemia risk.  - A1c was 5.6 in June, recent steroid injection caused temporary elevation in glucose, which has since normalized.  - Decreased Lantus (insulin glargine) from 30 units to 20-25 units at bedtime if patient has not eaten within few hours before sleep to prevent nocturnal hypoglycemia.  - Refilled Dexcom sensors for continuous glucose monitoring.    SACROILIITIS:  - Patient received right sacroiliac joint injection from Dr. Herrera 2-3 weeks ago, resulting in significant pain relief.  - Continued hydrocodone as needed for breakthrough pain.    GENERAL HEALTH MONITORING:  - Ordered comprehensive metabolic panel and other routine labs, excluding lipid panel due to patient not fasting.    FOLLOW-UP:  - Follow up with Dr. Herrera in 1 month as scheduled to assess efficacy of sacroiliac joint injection.            Saskia Albert MD  _________________________________________________________________________      Patient ID: Olga Roger is a 39 y.o. female.    History of Present Illness    CHIEF COMPLAINT:  Patient presents today for follow-up after receiving a sacroiliac joint injection.    BACK PAIN:  She received sacroiliac joint injection two weeks ago with significant improvement, reporting best pain relief experienced thus far. She denies requiring pain medication since the injection. She reports occasional transient shock-like sensations that come and go.    DIABETES:  She uses Dexcom sensors for continuous glucose monitoring and administers Glargine 30 units at night. She sometimes experiences hypoglycemia at night if she does not eat before bed. Blood sugar levels increased after receiving a steroid injection but have since normalized. A1c was 5.6 in June.    SOCIAL HISTORY:  She lives with her cousin and son.          Past medical histories reviewed, including past medical, surgical, family and social histories.      Current Outpatient Medications on File Prior to Visit  "  Medication Sig Dispense Refill    amLODIPine (NORVASC) 5 MG tablet Take 5 mg by mouth.      BD SOY 2ND GEN PEN NEEDLE 32 gauge x 5/32" Ndle use as directed      blood sugar diagnostic Strp To check BG 3 times daily, to use with insurance preferred meter 100 each 0    blood-glucose meter kit To check BG 3 times daily, to use with insurance preferred meter 100 each 1    blood-glucose sensor (DEXCOM G7 SENSOR MISC) 3      blood-glucose sensor (DEXCOM G7 SENSOR) Hui 1 Device by Misc.(Non-Drug; Combo Route) route every 10 days. 3 each 11    DEXCOM G7  Misc USE  ONCE DAILY 1 each 0    diclofenac 0.15% LIDOcaine 2.25% prilocaine 2.25% topical cream Apply topically 2 (two) times daily as needed (as needed for pain). 480 g 1    DOMPERIDONE, BULK, MISC 10 mg by Misc.(Non-Drug; Combo Route) route 3 (three) times daily as needed.      gabapentin (NEURONTIN) 300 MG capsule Take 1 capsule (300 mg total) by mouth 3 (three) times daily as needed (r hip pain). 90 capsule 11    HYDROcodone-acetaminophen (NORCO) 5-325 mg per tablet Take 1 tablet by mouth every 12 (twelve) hours as needed for Pain. 60 tablet 0    insulin (LANTUS SOLOSTAR U-100 INSULIN) glargine 100 units/mL SubQ pen Inject 30 Units into the skin every evening.      insulin lispro (HUMALOG KWIKPEN INSULIN) 100 unit/mL pen Inject 2-8 Units into the skin every 4 (four) hours. USING CORRECTION SCALE. 14.4 mL 11    lancets Misc To check BG 3 times daily, to use with insurance preferred meter 100 each 1    magnesium oxide (MAG-OX) 400 mg (241.3 mg magnesium) tablet Take 1 tablet (400 mg total) by mouth once daily. 30 tablet 2    methocarbamoL (ROBAXIN) 750 MG Tab Take 1 tablet (750 mg total) by mouth 4 (four) times daily. 40 tablet 1    metoclopramide HCl (REGLAN) 10 MG tablet Take 1 tablet (10 mg total) by mouth 3 (three) times daily before meals. 90 tablet 3    metoprolol succinate (TOPROL-XL) 50 MG 24 hr tablet Take 1 tablet (50 mg total) by mouth once daily. " 90 tablet 3    omeprazole (PRILOSEC) 40 MG capsule Take 40 mg by mouth.      pregabalin (LYRICA) 50 MG capsule TAKE 1 CAPSULE BY MOUTH THREE TIMES DAILY 90 capsule 2     Current Facility-Administered Medications on File Prior to Visit   Medication Dose Route Frequency Provider Last Rate Last Admin    levonorgestreL (MIRENA) 20 mcg/24 hours (7 yrs) 52 mg IUD 1 Intra Uterine Device  1 Intra Uterine Device Intrauterine  Tino Garcia NP   1 Intra Uterine Device at 11/23/21 1400       Review of Systems   12 point review of systems negative except for listed in HPI.     Objective:    Nursing note and vitals reviewed.  Vitals:    12/19/24 0857   BP: 132/87   Pulse: 85   Resp: 18   Temp: 97.9 °F (36.6 °C)     Body mass index is 30.92 kg/m².     Physical Exam   Constitutional: oriented to person, place, and time. well-developed and well-nourished. No distress.   HENT: WNL  Head: Normocephalic and atraumatic.   Eyes: EOM are normal.   Neck: Normal range of motion. Neck supple.   Cardiovascular: Normal rate  Pulmonary/Chest: Effort normal. No respiratory distress.   GI: soft, non distended, no ttp, no rebound/guarding  Musculoskeletal: Normal range of motion. no edema.   Neurological: CN II-XII intact  Skin: warm and dry.   Psychiatric: normal mood and affect. behavior is normal.           We Offer Telehealth & Same Day Appointments!   Book your Telehealth appointment with me through my nurse or   Clinic appointments on [facility]!  [phone]     To Schedule appointments online, go to [email or web address]: [email or web address]       Visit today included increased complexity associated with the care of the episodic problem addressed and managing the longitudinal care of the patient due to the serious and/or complex managed problem(s) as per problem list.     This note was generated with the assistance of ambient listening technology. Verbal consent was obtained by the patient and accompanying  visitor(s) for the recording of patient appointment to facilitate this note. I attest to having reviewed and edited the generated note for accuracy, though some syntax or spelling errors may persist. Please contact the author of this note for any clarification.

## 2024-12-20 DIAGNOSIS — D64.9 ANEMIA, UNSPECIFIED TYPE: Primary | ICD-10-CM

## 2024-12-20 NOTE — PROGRESS NOTES
3m cbc, iron, bmp, a1c    I have sent a msg to patient with the following interpretation (see below):    Blood count is low. - Take daily supplement. OTC SlowFe if regular iron makes you constipated.   - Be sure to eat dark green veggies: kale, spinach, collards; beets and red meat (if you eat red meat) are also good sources of iron.   - If you are unable to tolerate oral iron, we can send for iron infusions.    Are your periods heavy?       Your A1c is at goal (<7) !! This is excellent! Continue medications as prescribed; limiting sugary drinks (juice, soda, sports drinks) and sweets; Also, ensure daily exercise. We will repeat in 3 months.    Decreased kidney function.  Please avoid NSAIDs (Motrin, Excedrin, Goody or BC headache powders, uncoated aspirin, Advil, ibuprofen, aleve, naproxen, etc) and ensure adequate hydration with water. We will repeat in 3 months.           Please do not hesitate to call or message with any questions or concerns    Saskia Albert MD [facility] - Consult Note    Consultation request by Valorie Marin DO for advice/opinion related to evaluating Syncope and collapse    Date of  Admission: 1/19/2018  4:10 PM   Primary Care Physician:  Alexandra Alex MD     Assessment:     Patient Active Problem List   Diagnosis Code    Dyslipidemia E78.5    Coronary artery disease of native artery of native heart with stable angina pectoris ([facility] [address] I25.118    Cardiomyopathy I42.9    Atrial fibrillation I48.91    Sick sinus syndrome I49.5    S/P CABG x 2 Z95.1    Atherosclerotic NAHED (renal artery stenosis), bilateral (HCC) I70.1    Chronic systolic congestive heart failure (HCC) I50.22    Essential hypertension I10    Anxiety F41.9    Statin intolerance Z78.9    Osteoarthritis of both knees M17.0    Celiac artery stenosis (HCC) I77.4    Syncope and collapse R55        Plan:     His echo today shows normal EF with mild AS. No regional wall motion noted. He has permanent pacemaker in place. Would have the device interrogated as out patient. He appears reasonably stable. If he is able to ambulate without difficulty, reasonable to dc to home for out patient follow up with his primary cardiologist, Dr. Roslyn Momin on Tuesday or Wednesday. History of Present Illness: This is a 81 y.o. male admitted for Syncope and collapse. Patient complains of:  Near syncope    He is an 60-year-old man that has a history of hypertension, coronary artery disease, dyslipidemia, prior coronary bypass grafting, cardiomyopathy, atrial fibrillation, pacemaker, renal artery stenosis, celiac artery stenosis and congestive heart failure. He presented to the ED yesterday with near syncope and SOB; he denies devyn syncope. He has ruled out for MI. His echo today shows normal EF without regional wall motion abnormality and mild to moderate AS. He feels that he overexerted himself yesterday.  He denies any recent episodes of dizziness.      The patient had prior coronary bypass grafting in 2008. Billie Nogueraci to his coronary bypass surgery, he was experiencing primarily easy fatigability.  He has had repeat cardiac catheterizations since the time of his surgery, the last of which was done in 2016. At that time, medical therapy was advised. He had a nuclear stress test on 9/27/2017 that did not show evidence for ischemia and his EF was normal     Cardiac risk factors: dyslipidemia, male gender, hypertension, known CAD      Review of Symptoms:  Except as stated above include:  Constitutional:  negative  Respiratory:  negative  Cardiovascular:  negative  Gastrointestinal: negative  Genitourinary:  negative  Musculoskeletal:  Negative  Neurological:  Negative  Dermatological:  Negative  Endocrinological: Negative  Psychological:  Negative    A comprehensive review of systems was negative except for that written in the HPI. Past Medical History:     Past Medical History:   Diagnosis Date    Atrial fibrillation     CHADS score 3  (+CHF, +HTN, +AGE, -DM, -CVA)    CABG     2008   LIMA - LAD,   SVG - RCA    Cardiomyopathy     EF 30-35% (ECHO 6/14)    Coronary artery disease     Dyslipidemia     Hypertension     Hypothyroid     Pacemaker     Peripheral vascular disease     Renal artery stenosis     bilateral stents    Sick sinus syndrome          Social History:     Social History     Social History    Marital status:      Spouse name: N/A    Number of children: N/A    Years of education: N/A     Social History Main Topics    Smoking status: Former Smoker    Smokeless tobacco: Never Used    Alcohol use No    Drug use: No    Sexual activity: Not Asked     Other Topics Concern    None     Social History Narrative        Family History:   History reviewed. No pertinent family history. Medications:      Allergies   Allergen Reactions    Beta-Blockers (Beta-Adrenergic Blocking Agts) Drowsiness    Penicillins Swelling    Statins-Hmg-Coa Reductase Inhibitors Drowsiness        Current Facility-Administered Medications   Medication Dose Route Frequency    apixaban (ELIQUIS) tablet 5 mg  5 mg Oral BID    enalapril (VASOTEC) tablet 20 mg  20 mg Oral DAILY    furosemide (LASIX) tablet 40 mg  40 mg Oral DAILY    metoprolol succinate (TOPROL-XL) XL tablet 50 mg  50 mg Oral DAILY    LORazepam (ATIVAN) tablet 0.5 mg  0.5 mg Oral Q6H PRN    pantoprazole (PROTONIX) tablet 40 mg  40 mg Oral ACB    nitroglycerin (NITROSTAT) tablet 0.4 mg  0.4 mg SubLINGual Q5MIN PRN    amLODIPine (NORVASC) tablet 5 mg  5 mg Oral DAILY    isosorbide mononitrate ER (IMDUR) tablet 30 mg  30 mg Oral DAILY    acetaminophen (TYLENOL) solution 650 mg  650 mg Oral Q4H PRN    morphine (PF) (DURAMORPH) 2 mg/2 mL (1 mg/mL) in 0.9 % NaCl injection 1 mg  1 mg IntraVENous Q4H PRN    0.9% sodium chloride infusion  50 mL/hr IntraVENous CONTINUOUS    ondansetron (ZOFRAN ODT) tablet 4 mg  4 mg Oral Q6H PRN         Physical Exam:     Visit Vitals    /71 (BP 1 Location: Right arm)    Pulse 68    Temp 97.9 °F (36.6 °C)    Resp 18    Ht 5' 9\" (1.753 m)    Wt 63.5 kg (140 lb)    SpO2 95%    BMI 20.67 kg/m2     BP Readings from Last 3 Encounters:   01/20/18 151/71   01/03/18 128/80   11/27/17 145/72     Pulse Readings from Last 3 Encounters:   01/20/18 68   01/03/18 66   11/27/17 69     Wt Readings from Last 3 Encounters:   01/19/18 63.5 kg (140 lb)   01/03/18 66.2 kg (146 lb)   11/27/17 66.7 kg (147 lb)       General:  alert, cooperative, no distress, appears stated age  Neck:  no JVD  Lungs:  clear to auscultation bilaterally  Heart:  regular rate and rhythm, systolic murmur: systolic ejection 2/6, crescendo, decrescendo   Abdomen:  no guarding or rigidity  Extremities:  extremities normal, atraumatic, no cyanosis or edema  Skin: Warm and dry.  no hyperpigmentation, vitiligo, or suspicious lesions  Neuro: alert, oriented x3, affect appropriate, no focal neurological deficits, moves all extremities well, no involuntary movements  Psych: non focal     Data Review:     Recent Labs      01/20/18   0230  01/19/18   1630   WBC  4.9  5.5   HGB  12.2*  12.5*   HCT  39.5  40.5   PLT  173  156     Recent Labs      01/20/18   0230  01/19/18   1630   NA  137  138   K  3.9  4.3   CL  104  103   CO2  25  27   GLU  78  85   BUN  30*  33*   CREA  1.34*  1.47*   CA  8.3*  8.8       Results for orders placed or performed during the hospital encounter of 01/19/18   EKG, 12 LEAD, INITIAL   Result Value Ref Range    Ventricular Rate 70 BPM    Atrial Rate 344 BPM    QRS Duration 186 ms    Q-T Interval 466 ms    QTC Calculation (Bezet) 503 ms    Calculated R Axis -86 degrees    Calculated T Axis 93 degrees    Diagnosis       Electronic ventricular pacemaker  When compared with ECG of 25-SEP-2017 09:39,  Electronic ventricular pacemaker has replaced Wide QRS rhythm  Confirmed by Isabelle Raines ([id number]) on 1/19/2018 6:25:36 PM         All Cardiac Markers in the last 24 hours:    Lab Results   Component Value Date/Time    CPK 99 01/19/2018 04:30 PM    CKMB 5.4 (H) 01/19/2018 04:30 PM    CKND1 5.5 (H) 01/19/2018 04:30 PM    TROIQ 0.02 01/20/2018 08:40 AM    TROIQ 0.02 01/20/2018 02:30 AM    TROIQ 0.03 01/19/2018 09:00 PM    TROIQ 0.02 01/19/2018 04:30 PM       Last Lipid:    Lab Results   Component Value Date/Time    Cholesterol, total 149 09/09/2015 03:10 AM    HDL Cholesterol 41 09/09/2015 03:10 AM    LDL, calculated 90 09/09/2015 03:10 AM    Triglyceride 90 09/09/2015 03:10 AM    CHOL/HDL Ratio 3.6 09/09/2015 03:10 AM       Signed By: [id number] Juanita Parson, MD     January 20, 2018

## 2025-01-10 ENCOUNTER — OFFICE VISIT (OUTPATIENT)
Dept: PAIN MEDICINE | Facility: CLINIC | Age: 40
End: 2025-01-10
Payer: COMMERCIAL

## 2025-01-10 VITALS
RESPIRATION RATE: 17 BRPM | HEIGHT: 65 IN | SYSTOLIC BLOOD PRESSURE: 126 MMHG | WEIGHT: 190.69 LBS | DIASTOLIC BLOOD PRESSURE: 86 MMHG | HEART RATE: 86 BPM | BODY MASS INDEX: 31.77 KG/M2

## 2025-01-10 DIAGNOSIS — M76.01 GLUTEAL TENDONITIS OF RIGHT BUTTOCK: ICD-10-CM

## 2025-01-10 DIAGNOSIS — M46.1 SACROILIITIS: Primary | ICD-10-CM

## 2025-01-10 PROCEDURE — 99999 PR PBB SHADOW E&M-EST. PATIENT-LVL V: CPT | Mod: PBBFAC,,, | Performed by: PHYSICAL MEDICINE & REHABILITATION

## 2025-01-10 NOTE — PROGRESS NOTES
Established Patient Chronic Pain Note (follow up Visit)      Chief Complaint:   Chief Complaint   Patient presents with    Follow-up        SUBJECTIVE:    Interval HPI 01/10/2025:  Olga Roger is a 39 y.o. female presents today for follow-up injection after successfully undergoing right-sided SI joint injection on 12/03/2024.  She reports that this resulted in 90% relief overall.  However, patient still reports that she has occasional sudden jolts of pain in her right buttock area without any noticeable triggers.    Patient denies night fever/night sweats, urinary incontinence, bowel incontinence, significant weight loss, significant motor weakness, and loss of sensations.    Pain Disability Index Review:      1/10/2025     9:56 AM 11/15/2024     8:43 AM   Last 3 PDI Scores   Pain Disability Index (PDI) 36 59       Initial HPI 11/15/2024:  Olga Roger is a 39 y.o. female who presents to the clinic for the evaluation of right sided lower back and leg pain.  She was referred by her primary care team for further evaluation and management of this pain.  She has past medical history of depression, hyperlipidemia, hypertension, DM2, gastroparesis, and multiple other medical comorbidities as listed in chart.The pain started a couple of months ago and symptoms have been worsening.The pain is located in the right lumbosacral area and radiates to the right lateral hip and posterior thigh and occasionally into the right groin.  The pain is described as sharp, stabbing and is rated as 7/10. The pain is rated with a score of  7/10 on the BEST day and a score of 10/10 on the WORST day.  Symptoms interfere with daily activity. The pain is exacerbated by nothing in particular.  The pain is mitigated by nothing.     Non-Pharmacologic Treatments:  Physical Therapy/Home Exercise: yes, VERY LIMITED BENEFIT DUE TO SEVERE PAIN  Ice/Heat:yes  TENS: no  Acupuncture: no  Massage: no  Chiropractic: no    Other: no      Pain  Medications:  - Opioids: Tramadol, Percocet, Norco  - Adjuvant Medications: gabapentin, Lyrica, compound cream, Robaxin  - Anti-Coagulants:  none     report:  Reviewed and consistent with medication use as prescribed.    Pain Procedures:   Tenjet on the right hip   Right hip joint injection under ultrasound  Right SI joint injection on 12/03/2024, 90% relief      Imaging:    X-ray right hip 10/22/2024:   No change. There is no fracture or dislocation. Hip joint space is normal. No congenital sequela. Right iliac wing bone island. IUD.     X-ray lumbar spine 10/22/2024:   There is no fracture or malalignment. The disc spaces are well-preserved. No degenerative sequela. Scattered colonic air-fluid levels. No bowel obstruction. No stool retention. IUD. Right iliac wing bone island.     MRI right hip 09/05/2024:  BONES: 1 cm benign incidental sclerotic bone island right iliac wing.  No fracture, marrow edema or suspicious bone lesion. No evidence of avascular necrosis.     RIGHT HIP JOINT: Anatomic alignment. Physiologic amount of joint fluid. Chondral thinning and small partial chondral erosions in the superior joint space with a small full-thickness chondral defect and mild subchondral cystic change of the anterior   acetabular roof.     LABRUM: Incidental anterior inferior sublabral sulcus.  Possible small low-grade partial-thickness linear undersurface tear of the anterior labrum (image 17 series 10).  Possible low-grade partial undersurface tear of the superior labrum (coronal images   14-17 series 6).     FEMOROACETABULAR IMPINGEMENT ANATOMY: Femoral head-neck morphology is within normal limits. Head-neck alpha angle is normal measuring 53 degrees. No pincer lesions identified.     MUSCLES, TENDONS AND REMAINING MAJOR SUPPORTING SOFT TISSUE STRUCTURES: Mild abductor tendinosis.  Remaining muscles and tendons are within normal limits.         PMHx,PSHx, Social history, and Family history:  I have reviewed the  "patient's medical, surgical, social, and family history in detail and updated the computerized patient record.        Review of patient's allergies indicates:   Allergen Reactions    Bement Hives       Current Outpatient Medications   Medication Sig    amLODIPine (NORVASC) 5 MG tablet Take 5 mg by mouth.    BD SOY 2ND GEN PEN NEEDLE 32 gauge x 5/32" Ndle use as directed    blood sugar diagnostic Strp To check BG 3 times daily, to use with insurance preferred meter    blood-glucose sensor (DEXCOM G7 SENSOR MISC) 3    blood-glucose sensor (DEXCOM G7 SENSOR) Hui 1 Device by Misc.(Non-Drug; Combo Route) route every 10 days.    blood-glucose sensor (DEXCOM G7 SENSOR) Hui 1 each by Misc.(Non-Drug; Combo Route) route every 10 days.    DEXCOM G7  Misc USE  ONCE DAILY    diclofenac 0.15% LIDOcaine 2.25% prilocaine 2.25% topical cream Apply topically 2 (two) times daily as needed (as needed for pain).    DOMPERIDONE, BULK, MISC 10 mg by Misc.(Non-Drug; Combo Route) route 3 (three) times daily as needed.    gabapentin (NEURONTIN) 300 MG capsule Take 1 capsule (300 mg total) by mouth 3 (three) times daily as needed (r hip pain).    HYDROcodone-acetaminophen (NORCO) 5-325 mg per tablet Take 1 tablet by mouth every 12 (twelve) hours as needed for Pain.    insulin (LANTUS SOLOSTAR U-100 INSULIN) glargine 100 units/mL SubQ pen Inject 30 Units into the skin every evening.    insulin lispro (HUMALOG KWIKPEN INSULIN) 100 unit/mL pen Inject 2-8 Units into the skin every 4 (four) hours. USING CORRECTION SCALE.    lancets Misc To check BG 3 times daily, to use with insurance preferred meter    magnesium oxide (MAG-OX) 400 mg (241.3 mg magnesium) tablet Take 1 tablet (400 mg total) by mouth once daily.    methocarbamoL (ROBAXIN) 750 MG Tab Take 1 tablet (750 mg total) by mouth 4 (four) times daily.    metoclopramide HCl (REGLAN) 10 MG tablet Take 1 tablet (10 mg total) by mouth 3 (three) times daily before meals.    " "metoprolol succinate (TOPROL-XL) 50 MG 24 hr tablet Take 1 tablet (50 mg total) by mouth once daily.    omeprazole (PRILOSEC) 40 MG capsule Take 40 mg by mouth.    pregabalin (LYRICA) 50 MG capsule TAKE 1 CAPSULE BY MOUTH THREE TIMES DAILY    blood-glucose meter kit To check BG 3 times daily, to use with insurance preferred meter     Current Facility-Administered Medications   Medication    levonorgestreL (MIRENA) 20 mcg/24 hours (7 yrs) 52 mg IUD 1 Intra Uterine Device       Review of Systems     GENERAL:  No weight loss, malaise or fevers.  HEENT:   No recent changes in vision or hearing  NECK:  Negative for lumps, no difficulty with swallowing.  RESPIRATORY:  Negative for cough, wheezing or shortness of breath, patient denies any recent URI.  CARDIOVASCULAR:  Negative for chest pain, leg swelling or palpitations.  GI:  Negative for abdominal discomfort, blood in stools or black stools or change in bowel habits.  MUSCULOSKELETAL:  See HPI.  SKIN:  Negative for lesions, rash, and itching.  PSYCH:  No mood disorder or recent psychosocial stressors.  Patients sleep is not disturbed secondary to pain.  HEMATOLOGY/LYMPHOLOGY:  Negative for prolonged bleeding, bruising easily or swollen nodes.  Patient is not currently taking any anti-coagulants  NEURO:   No history of headaches, syncope, paralysis, seizures or tremors.  All other reviewed and negative other than HPI.    OBJECTIVE:    /86   Pulse 86   Resp 17   Ht 5' 5" (1.651 m)   Wt 86.5 kg (190 lb 11.2 oz)   BMI 31.73 kg/m²         Physical Exam    GENERAL: Well appearing, in no acute distress, alert and oriented x3.  PSYCH:  Mood and affect appropriate.  SKIN: Skin color, texture, turgor normal, no rashes or lesions.  HEAD/FACE:  Normocephalic, atraumatic. Cranial nerves grossly intact.  CV: RRR with palpation of the radial artery.  PULM: No evidence of respiratory difficulty, symmetric chest rise.  GI:  Soft and non-tender.  BACK: Straight leg raising in " the sitting and supine positions is equivocal to radicular pain. No pain to palpation over the facet joints of the lumbar spine and lumbar paraspinals.  Limited range of motion secondary to pain reproduction. Directional preference:  Pain with both flexion and extension  EXTREMITIES:  No deformities, edema, or skin discoloration. Good capillary refill.  MUSCULOSKELETAL:  Hip and knee provocative maneuvers are unremarkable with the exception of tenderness over the right gluteal tendons..  There is moderate pain with palpation over the sacroiliac joint on the right.  FABERs test is positive on the right.  Gaenslen's positive on the right.  Sacral thrust positive on the right.  FADIRs test is equivocal.   Bilateral upper and lower extremity strength is normal and symmetric.  No atrophy or tone abnormalities are noted.  NEURO: Bilateral upper and lower extremity coordination and muscle stretch reflexes are physiologic and symmetric.  Plantar response are downgoing. No clonus.  No loss of sensation is noted.  GAIT:  Slow, antalgic.      LABS:  Lab Results   Component Value Date    WBC 7.10 12/19/2024    HGB 9.8 (L) 12/19/2024    HCT 34.7 (L) 12/19/2024    MCV 83 12/19/2024     12/19/2024       CMP  Sodium   Date Value Ref Range Status   12/19/2024 140 136 - 145 mmol/L Final     Potassium   Date Value Ref Range Status   12/19/2024 4.4 3.5 - 5.1 mmol/L Final     Chloride   Date Value Ref Range Status   12/19/2024 109 95 - 110 mmol/L Final     CO2   Date Value Ref Range Status   12/19/2024 22 (L) 23 - 29 mmol/L Final     Glucose   Date Value Ref Range Status   12/19/2024 146 (H) 70 - 110 mg/dL Final     BUN   Date Value Ref Range Status   12/19/2024 23 (H) 6 - 20 mg/dL Final     Creatinine   Date Value Ref Range Status   12/19/2024 1.5 (H) 0.5 - 1.4 mg/dL Final     Calcium   Date Value Ref Range Status   12/19/2024 9.1 8.7 - 10.5 mg/dL Final     Total Protein   Date Value Ref Range Status   06/19/2024 7.6 6.0 - 8.4  g/dL Final     Albumin   Date Value Ref Range Status   06/19/2024 3.7 3.5 - 5.2 g/dL Final     Total Bilirubin   Date Value Ref Range Status   06/19/2024 0.2 0.1 - 1.0 mg/dL Final     Comment:     For infants and newborns, interpretation of results should be based  on gestational age, weight and in agreement with clinical  observations.    Premature Infant recommended reference ranges:  Up to 24 hours.............<8.0 mg/dL  Up to 48 hours............<12.0 mg/dL  3-5 days..................<15.0 mg/dL  6-29 days.................<15.0 mg/dL       Alkaline Phosphatase   Date Value Ref Range Status   06/19/2024 91 55 - 135 U/L Final     AST   Date Value Ref Range Status   06/19/2024 13 10 - 40 U/L Final     ALT   Date Value Ref Range Status   06/19/2024 6 (L) 10 - 44 U/L Final     Anion Gap   Date Value Ref Range Status   12/19/2024 9 8 - 16 mmol/L Final     eGFR if    Date Value Ref Range Status   09/23/2021 >60.0 >60 mL/min/1.73 m^2 Final     eGFR if non    Date Value Ref Range Status   09/23/2021 >60.0 >60 mL/min/1.73 m^2 Final     Comment:     Calculation used to obtain the estimated glomerular filtration  rate (eGFR) is the CKD-EPI equation.          Lab Results   Component Value Date    HGBA1C 6.3 (H) 12/19/2024             ASSESSMENT: 39 y.o. year old female with lower back and right hip pain, consistent with     1. Sacroiliitis  Ambulatory Referral/Consult to Physical Therapy      2. Gluteal tendonitis of right buttock  Ambulatory Referral/Consult to Physical Therapy              PLAN:   - Interventions:  S/p right-sided SI joint injection on 12/03/2024, 90% relief    - Anticoagulation use:  None      - Medications: I have stressed the importance of physical activity and a home exercise plan to help with pain and improve health. and Patient can continue with medications for now since they are providing benefits, using them appropriately, and without side effects.         -  Therapy:  Refer back to physical therapy to help address gluteal tendinopathy    - Psychological:  Discussed coping mechanisms to help address chronic pain issues    - Labs:  Reviewed    - Imaging: Reviewed available imaging with patient and answered any questions they had regarding study.    - Consults/Referrals:  None at this time    - Records:  Reviewed/Obtain old records from outside physicians and imaging    - Follow up visit: return to clinic in 8-10 weeks or as needed    - Counseled patient regarding the importance of activity modification and physical therapy    - This condition does not require this patient to take time off of work, and the primary goal of our Pain Management services is to improve the patient's functional capacity.    - Patient Questions: Answered all of the patient's questions regarding diagnosis, therapy, and treatment        The above plan and management options were discussed at length with patient. Patient is in agreement with the above and verbalized understanding.      Fam Herrera MD  Interventional Pain Management  [facility]    Disclaimer:  This note was prepared using voice recognition system and is likely to have sound alike errors that may have been overlooked even after proof reading.  Please call me with any questions

## 2025-01-10 NOTE — LETTER
January 10, 2025    Olga Roger  [address]  [place] [address]             [place] - Interventional Pain Medicine  Pain Medicine  [address] [facility]  [place] [address]  Phone: [phone]  Fax: [phone]   January 10, 2025     Patient: Olga Roger   YOB: 1985   Date of Visit: 1/10/2025       To Whom it May Concern:    Olga Roger was seen in my clinic on 1/10/2025. She may return to work on 01/10/2025 .    Please excuse her from any classes or work missed.    If you have any questions or concerns, please don't hesitate to call.    Sincerely,         Fam Herrera MD

## 2025-01-16 ENCOUNTER — CLINICAL SUPPORT (OUTPATIENT)
Dept: REHABILITATION | Facility: HOSPITAL | Age: 40
End: 2025-01-16
Attending: PHYSICAL MEDICINE & REHABILITATION
Payer: COMMERCIAL

## 2025-01-16 DIAGNOSIS — M76.01 GLUTEAL TENDONITIS OF RIGHT BUTTOCK: ICD-10-CM

## 2025-01-16 DIAGNOSIS — M46.1 SACROILIITIS: ICD-10-CM

## 2025-01-16 PROCEDURE — 97161 PT EVAL LOW COMPLEX 20 MIN: CPT | Mod: PN

## 2025-01-16 PROCEDURE — 97110 THERAPEUTIC EXERCISES: CPT | Mod: PN

## 2025-01-16 NOTE — PLAN OF CARE
[facility]   Physical Therapy Initial Evaluation     Date: 1/16/2025   Name: Olga Roger  Clinic Number: [id number]    Therapy Diagnosis:   Encounter Diagnoses   Name Primary?    Sacroiliitis     Gluteal tendonitis of right buttock      Physician: Fam Herrera MD    Physician Orders: PT Eval and Treat   Medical Diagnosis from Referral:   M46.1 (ICD-10-CM) - Sacroiliitis   M76.01 (ICD-10-CM) - Gluteal tendonitis of right buttock   Evaluation Date: 1/16/2025  Authorization Period Expiration: 01/10/2026   Plan of Care Expiration: 3/16/2025  Progress Note Due: 2/16/2025  Visit # / Visits authorized: 1/ 1   FOTO: 1/ 3     Precautions: Standard    Time In: 5:00 pm  Time Out: 6:00 pm  Total Appointment Time (timed & untimed codes): 60 minutes      SUBJECTIVE   Date of onset: chronic    History of current condition - Olga reports: 8 months ago, she was in hospital for 4 months and began having pain in low back and right hip shortly after discharge.  Patient reports having TENJET procedure that aggravated symptoms, receiving injections that helped.  She had SI joint injection and also steroid injection in hip and SI injection was the most helpful.  She reports having a new pain that is intermittent and described as shocking that starts in buttock and radiates along lateral aspect of right thigh.  She reports pain is random and no specific trigger and usually lasts a couple minutes.      Falls: no    Imaging, x-rays and MRI    Prior Therapy: yes but not for this problem  Social History:  lives with son and cousin  Occupation: inventory for [employer]  Prior Level of Function: independent  Current Level of Function: independent    Pain:  Current 0/10, worst 8/10, best 0/10   Location:   right side low back, hip, leg  Description: Sharp, Electric, and Shooting  Aggravating Factors: nothing specific  Easing Factors: nothing    Patients goals: get rid of pain     Medical History:   Past Medical  History:   Diagnosis Date    Diabetes mellitus type 2 in nonobese 10/31/2018    A1c >14% as of 11/1/19; Stopped metformin 2/2 diarrhea -> Lantus & atorvastatin on 11/1/19    Hypertriglyceridemia 11/01/2019    TG 1,261 mg/dL, for which we will consider fenofibrate if atorvastatin doesn't causes significant transaminitis (will also consider Lovaza)    Overweight (BMI 25.0-29.9)        Surgical History:   Olga Roger  has a past surgical history that includes Injection of anesthetic agent into sacroiliac joint (Right, 12/3/2024).    Medications:   Olga has a current medication list which includes the following prescription(s): amlodipine, bd khadijah 2nd gen pen needle, blood sugar diagnostic, blood-glucose meter, blood-glucose sensor, dexcom g7 sensor, dexcom g7 sensor, dexcom g7 , diclofenac sodium, domperidone, gabapentin, hydrocodone-acetaminophen, lantus solostar u-100 insulin, insulin lispro, lancets, magnesium oxide, methocarbamol, metoclopramide hcl, metoprolol succinate, omeprazole, and pregabalin, and the following Facility-Administered Medications: levonorgestrel.    Allergies:   Review of patient's allergies indicates:   Allergen Reactions    Strawberry Hives          OBJECTIVE     Red Flag Screening:  Cough  Sneeze  Strain: (--)  Bladder/ bowel: (--)  Falls: (--)  Night pain: (--)  Unexplained weight loss: (--)  General health: good       Observation: increased lumbar lordosis      Lumbar Range of Motion:    Degrees Pain   Flexion Reach upper shin   tightness        Extension 15 deg   no        Left Side Bending Reach lower thigh no        Right Side Bending Reach lower thigh no        Left rotation   45 deg no        Right Rotation   45 deg no             Lower Extremity Strength  Right LE  Left LE    Knee extension: 5/5 Knee extension: 5/5   Knee flexion: 5/5 Knee flexion: 5/5   Hip flexion: 4+/5 Hip flexion: 4+/5   Hip extension:  4/5 Hip extension: 4/5   Hip abduction: 4/5 Hip abduction: 4/5    Hip adduction: 4/5 Hip adduction 4/5   Ankle dorsiflexion: 5/5 Ankle dorsiflexion: 5/5   Ankle plantarflexion: 5/5 Ankle plantarflexion: 5/5         Special Tests:  -Repeated Flexion: no increased pain  -Repeated Ext: no increased pain  -Piriformis Test: negative  -ROSETTE: negative  -Thigh thrust: negative  -SI compression: negative  -SI distraction: negative        Neuro Dynamic Testing:    Sciatic nerve:      SLR: R = negative     L = negative      Slump: R = negative     L = negative       Femoral Nerve:    Femoral nerve test: negative      Joint Mobility: normal    Palpation: tenderness noted in gluteus medius, minimus, and cherry, piriformis, TFL, and over greater trochanter    Sensation: intact to light touch    Flexibility:    90/90 hamstring test: R = 35 degrees ; L = 30 degrees   Main's test: R = positive ; L = negative         Intake Outcome Measure for FOTO Low Back Survey    Therapist reviewed FOTO scores for Olga Roger on 1/16/2025.   FOTO documents entered into [employer] - see Media section.    Intake Score: 63%           TREATMENT     Total Treatment time (time-based codes) separate from Evaluation: 10 minutes     Olga received the treatments listed below:       therapeutic exercises to develop strength, endurance, ROM, flexibility, posture, and core stabilization for (10)  minutes including:  - patient education, instruction and demonstration of HEP         PATIENT EDUCATION AND HOME EXERCISES     Education provided:   - role of PT and goals for therapy  - HEP    Written Home Exercises Provided: yes. Exercises were reviewed and Olga was able to demonstrate them prior to the end of the session.  Olga demonstrated good  understanding of the education provided. See EMR under Patient Instructions for exercises provided during therapy sessions.    ASSESSMENT     Olga is a 39 y.o. female referred to outpatient Physical Therapy with a medical diagnosis of sacroiliitis and gluteal tendonitis of  right buttock.  Patient presents with signs and symptoms consistent with the diagnosis.  He is noted to have decreased LROM, decreased LE strength, decreased core stabilization strength, decreased LE flexibility, decreased joint mobility, and tight/tender surrounding musculature causing difficulty performing normal daily activities.  Pt would benefit from manual therapy, LE stretching and strengthening, core stabilization exercises, and modalities to decrease pain, improve functional mobility, and return to prior level of function.       Patient prognosis is Good.   Patientt will benefit from skilled outpatient Physical Therapy to address the deficits stated above and in the chart below, provide patient /family education, and to maximize patientt's level of independence.     Plan of care discussed with patient: Yes  Patient's spiritual, cultural and educational needs considered and patient is agreeable to the plan of care and goals as stated below:     Anticipated Barriers for therapy: none    Medical Necessity is demonstrated by the following  History  Co-morbidities and personal factors that may impact the plan of care [x] LOW: no personal factors / co-morbidities  [] MODERATE: 1-2 personal factors / co-morbidities  [] HIGH: 3+ personal factors / co-morbidities    Moderate / High Support Documentation:   Co-morbidities affecting plan of care:     Personal Factors:   no deficits     Examination  Body Structures and Functions, activity limitations and participation restrictions that may impact the plan of care [x] LOW: addressing 1-2 elements  [] MODERATE: 3+ elements  [] HIGH: 4+ elements (please support below)    Moderate / High Support Documentation:      Clinical Presentation [x] LOW: stable  [] MODERATE: Evolving  [] HIGH: Unstable     Decision Making/ Complexity Score: low       Goals:  Short Term Goals: 4 weeks  Patient will be compliant with HEP to promote the independent management of current  diagnosis.  Patient will increase lumbar forward bending to reach mid shin level for function of dressing.  Patient will improve bilateral hamstring flexibility to (25)deg during 90/90 testing.  Patient will report a decrease in complaints of low back pain to 4/10 during ADLs.      Long Term Goals:  8 weeks  Patient will improve bilateral hip strength to 5/5 to improve tolerance to normal house work activities for self care independence.  Patient will increase lumbar forward bending to reach ankle level for function of dressing.  Patient will report a decrease in complaints of low back pain to 1/10 during ADLs.  Patient will improve FOTO status from 63% to 71% indicating increased functional mobility.     PLAN   Plan of care Certification: 1/16/2025 to 3/16/2025.    Outpatient Physical Therapy 2 times weekly for 8 weeks to include the following interventions: Electrical Stimulation IFC, Manual Therapy, Moist Heat/ Ice, Neuromuscular Re-ed, Patient Education, Therapeutic Activities, Therapeutic Exercise, and IASTM, vacuum cupping, dry needling, and kinesiotaping .     Brent Rogers, PT      I CERTIFY THE NEED FOR THESE SERVICES FURNISHED UNDER THIS PLAN OF TREATMENT AND WHILE UNDER MY CARE   Physician's comments:     Physician's Signature: ___________________________________________________

## 2025-01-17 PROBLEM — M46.1 SACROILIITIS: Status: ACTIVE | Noted: 2025-01-17

## 2025-01-17 PROBLEM — M76.01 GLUTEAL TENDONITIS OF RIGHT BUTTOCK: Status: ACTIVE | Noted: 2025-01-17

## 2025-01-24 DIAGNOSIS — E11.65 UNCONTROLLED TYPE 2 DIABETES MELLITUS WITH HYPERGLYCEMIA: ICD-10-CM

## 2025-01-24 RX ORDER — INSULIN GLARGINE 100 [IU]/ML
30 INJECTION, SOLUTION SUBCUTANEOUS NIGHTLY
Refills: 1
Start: 2025-01-24 | End: 2025-01-29 | Stop reason: SDUPTHER

## 2025-01-24 NOTE — TELEPHONE ENCOUNTER
----- Message from Summer sent at 1/23/2025  8:15 AM CST -----  Contact: Olga  Type:  RX Refill Request    Who Called:  Leroyal  Refill or New Rx:Refill  RX Name and Strength:insulin (LANTUS SOLOSTAR U-100 INSULIN) glargine 100 units/mL SubQ pen   How is the patient currently taking it? (ex. 1XDay):as directed  Is this a 30 day or 90 day RX: as directed  Preferred Pharmacy with phone number:  [facility] [address] [place] [address] [address] [facility]  [address] [facility] [address]  Phone: [phone] Fax: [phone]  Local or Mail Order: Local  Ordering Provider: Saskia Albert   Would the patient rather a call back or a response via [facility]? Call back   Best Call Back Number:[phone]  Additional Information: Pt has one pen left

## 2025-01-24 NOTE — TELEPHONE ENCOUNTER
Care Due:                  Date            Visit Type   Department     Provider  --------------------------------------------------------------------------------                                EP -                              PRIMARY      [facility] FAMILY  Last Visit: 12-      CARE ([facility])   YESSICA Albert                               -                              PRIMARY      [facility]  Next Visit: 06-      CARE ([facility])   YESSICA CÁRDENAS [facility]  Test          Frequency    Reason                     Performed    Due Date  --------------------------------------------------------------------------------    Mg Level....  12 months..  magnesium................  04- 04-    [employer] Embedded Care Due Messages. Reference number: [id number].   1/24/2025 7:52:30 AM CST

## 2025-01-24 NOTE — TELEPHONE ENCOUNTER
Refill Routing Note   Medication(s) are not appropriate for processing by [facility] for the following reason(s):        Required labs abnormal  Responsible provider unclear  Clarification of medication (Rx) details    [facility] action(s):  Defer      Medication Therapy Plan: 10 OR 30 UNITS EVERY EVENING      Appointments  past 12m or future 3m with PCP    Date Provider   Last Visit   12/19/2024 Saskia Albert MD   Next Visit   6/19/2025 Saskia Albert MD   ED visits in past 90 days: 0        Note composed:8:15 AM 01/24/2025

## 2025-01-29 DIAGNOSIS — E11.65 UNCONTROLLED TYPE 2 DIABETES MELLITUS WITH HYPERGLYCEMIA: ICD-10-CM

## 2025-01-29 RX ORDER — INSULIN GLARGINE 100 [IU]/ML
30 INJECTION, SOLUTION SUBCUTANEOUS NIGHTLY
Qty: 27 ML | Refills: 5 | Status: SHIPPED | OUTPATIENT
Start: 2025-01-29 | End: 2025-04-29

## 2025-01-29 NOTE — TELEPHONE ENCOUNTER
"I spoke with the patient about this. Original prescription was sent to "no print." Pt requesting refill. Please review and sign pended orders if you agree  "

## 2025-01-29 NOTE — TELEPHONE ENCOUNTER
No care due was identified.  [facility] Embedded Care Due Messages. Reference number: [id number].   1/29/2025 11:57:20 AM CST

## 2025-01-30 ENCOUNTER — CLINICAL SUPPORT (OUTPATIENT)
Dept: REHABILITATION | Facility: HOSPITAL | Age: 40
End: 2025-01-30
Payer: COMMERCIAL

## 2025-01-30 DIAGNOSIS — M76.01 GLUTEAL TENDONITIS OF RIGHT BUTTOCK: ICD-10-CM

## 2025-01-30 DIAGNOSIS — M46.1 SACROILIITIS: Primary | ICD-10-CM

## 2025-01-30 PROCEDURE — 97140 MANUAL THERAPY 1/> REGIONS: CPT | Mod: PN

## 2025-01-30 PROCEDURE — 97110 THERAPEUTIC EXERCISES: CPT | Mod: PN

## 2025-01-30 PROCEDURE — 97112 NEUROMUSCULAR REEDUCATION: CPT | Mod: PN

## 2025-01-30 NOTE — PROGRESS NOTES
"  Outpatient Rehab    Physical Therapy Visit    Patient Name: Olga Roger  MRN: [id number]  YOB: 1985  Today's Date: 1/30/2025    Therapy Diagnosis:   Encounter Diagnoses   Name Primary?    Sacroiliitis Yes    Gluteal tendonitis of right buttock      Physician: Fam Herrera MD    Physician Orders: PT Eval and Treat   Medical Diagnosis from Referral:   M46.1 (ICD-10-CM) - Sacroiliitis   M76.01 (ICD-10-CM) - Gluteal tendonitis of right buttock   Evaluation Date: 1/16/2025  Authorization Period Expiration: 01/10/2026   Plan of Care Expiration: 3/16/2025  Progress Note Due: 2/16/2025  Visit # / Visits authorized: 1/ 1 pending  FOTO: 1/ 3      Precautions: Standard     Time In: 1700   Time Out: 1755  Total Time: 55   Total Billable Time: 55         Subjective   she had hip pain this morning while at work, but she is not having any pain in hip at time of treatment..  Pain reported as 0. right hip, patient reported 8/10 pain today at work    Objective            Treatment:  Therapeutic Exercise  Therapeutic Exercise Activity 1: Nu-Step x 8 minutes, level 5  Therapeutic Exercise Activity 2: seated sciatic nerve flossing x 10 reps  Therapeutic Exercise Activity 3: supine hamstring stetch with strap 3 x 20"  Therapeutic Exercise Activity 4: supine pirifromis stretch 3 x 20"  Therapeutic Exercise Activity 5: side lying hip abduction 2 x 10  Therapeutic Exercise Activity 6: patient education, review of HEP    Manual Therapy  Manual Therapy Activity 1: IASTM and STM to right gluteus medius, minimus, cherry, piriformis, TFL, quads, and IT band    Balance/Neuromuscular Re-Education  Balance/Neuromuscular Re-Education Activity 1: pelvic tilt x 10 reps, 3" hold  Balance/Neuromuscular Re-Education Activity 2: TA with march 2 x 10  Balance/Neuromuscular Re-Education Activity 3: TA with Hip adduction ball squeeze x 20 reps, 3" hold  Balance/Neuromuscular Re-Education Activity 4: TA with SLR 2 x " 10  Balance/Neuromuscular Re-Education Activity 5: side lying clamshell 2 x10, green band  Balance/Neuromuscular Re-Education Activity 6: TA with bridge 2 x 10         Patient's spiritual, cultural, and educational needs considered and patient agreeable to plan of care and goals.     Assessment & Plan   Assessment: Patient was very tender along right glute musculature and was unable to tolerate massage gun.  She was able to tolerate light manual therapy and may benefit from dry needling.  Patient will require progression of strengthening exercises, and noted fatigue during SLR.  Evaluation/Treatment Tolerance: Patient tolerated treatment well  Education  Education was done with Patient. The patient's learning style includes Demonstration and Listening. The patient Demonstrates understanding and Verbalizes understanding.                 Plan: continue with plan of care and progress as tolerated.  possible dry needling    Goals:   Active       Long Term Goals       Patient will improve bilateral hip strength to 5/5 to improve tolerance to normal house work activities for self care independence.       Start:  01/30/25    Expected End:  03/17/25            Patient will increase lumbar forward bending to reach ankle level for function of dressing.       Start:  01/30/25    Expected End:  03/17/25            Patient will report a decrease in complaints of low back pain to 1/10 during ADLs.       Start:  01/30/25    Expected End:  03/17/25            Patient will improve FOTO status from 63% to 71% indicating increased functional mobility.        Start:  01/30/25    Expected End:  03/17/25               Short Term Goals       Patient will be compliant with HEP to promote the independent management of current diagnosis.       Start:  01/30/25    Expected End:  02/17/25            Patient will increase lumbar forward bending to reach mid shin level for function of dressing.       Start:  01/30/25    Expected End:  02/17/25             Patient will improve bilateral hamstring flexibility to (25)deg during 90/90 testing.       Start:  01/30/25    Expected End:  02/17/25            Patient will report a decrease in complaints of low back pain to 4/10 during ADLs.       Start:  01/30/25    Expected End:  02/17/25

## 2025-01-31 DIAGNOSIS — E11.9 DIABETES MELLITUS TYPE 2 IN NONOBESE: ICD-10-CM

## 2025-02-01 RX ORDER — BLOOD-GLUCOSE SENSOR
1 EACH MISCELLANEOUS
Qty: 9 EACH | Refills: 3 | Status: SHIPPED | OUTPATIENT
Start: 2025-02-01

## 2025-02-01 NOTE — TELEPHONE ENCOUNTER
No care due was identified.  [facility] Embedded Care Due Messages. Reference number: [id number].   1/31/2025 8:33:15 PM CST

## 2025-02-06 ENCOUNTER — CLINICAL SUPPORT (OUTPATIENT)
Dept: REHABILITATION | Facility: HOSPITAL | Age: 40
End: 2025-02-06
Payer: COMMERCIAL

## 2025-02-06 DIAGNOSIS — M76.01 GLUTEAL TENDONITIS OF RIGHT BUTTOCK: ICD-10-CM

## 2025-02-06 DIAGNOSIS — M46.1 SACROILIITIS: Primary | ICD-10-CM

## 2025-02-06 PROCEDURE — 97530 THERAPEUTIC ACTIVITIES: CPT | Mod: PN

## 2025-02-06 PROCEDURE — 97110 THERAPEUTIC EXERCISES: CPT | Mod: PN

## 2025-02-06 PROCEDURE — 97140 MANUAL THERAPY 1/> REGIONS: CPT | Mod: PN

## 2025-02-06 PROCEDURE — 97112 NEUROMUSCULAR REEDUCATION: CPT | Mod: PN

## 2025-02-06 NOTE — PROGRESS NOTES
"  Outpatient Rehab    Physical Therapy Visit    Patient Name: Olga Rogre  MRN: [id number]  YOB: 1985  Today's Date: 2/6/2025    Therapy Diagnosis:   Encounter Diagnoses   Name Primary?    Sacroiliitis Yes    Gluteal tendonitis of right buttock      Physician: Fam Herrera MD    Physician Orders: PT Eval and Treat   Medical Diagnosis from Referral:   M46.1 (ICD-10-CM) - Sacroiliitis   M76.01 (ICD-10-CM) - Gluteal tendonitis of right buttock   Evaluation Date: 1/16/2025  Authorization Period Expiration: 01/10/2026   Plan of Care Expiration: 3/16/2025  Progress Note Due: 2/16/2025  Visit # / Visits authorized: 2/ 1 pending  FOTO: 1/ 3      Precautions: Standard     Time In: 1700   Time Out: 1745  Total Time: 45   Total Billable Time: 44         Subjective   she had soreness in hip following last visit and had pain over the weeknd.    right hip    Objective            Treatment:  Therapeutic Exercise  Therapeutic Exercise Activity 1: Nu-Step x 8 minutes, level 5  Therapeutic Exercise Activity 2: seated sciatic nerve flossing x 10 reps  Therapeutic Exercise Activity 3: supine hamstring stetch with strap 3 x 20"  Therapeutic Exercise Activity 4: supine pirifromis stretch 3 x 20"  Therapeutic Exercise Activity 5: standing hip abduction 2 x 10 each  Therapeutic Exercise Activity 6: standing hip extension 2 x 10 each    Manual Therapy  Manual Therapy Activity 1: IASTM and STM to right gluteus medius, minimus, cherry, piriformis, TFL, quads, and IT band    Balance/Neuromuscular Re-Education  Balance/Neuromuscular Re-Education Activity 1: pelvic tilt x 10 reps, 3" hold  Balance/Neuromuscular Re-Education Activity 2: TA with march 2 x 10  Balance/Neuromuscular Re-Education Activity 3: TA with Hip adduction ball squeeze x 20 reps, 3" hold  Balance/Neuromuscular Re-Education Activity 4: TA with SLR 2 x 10  Balance/Neuromuscular Re-Education Activity 5: side lying clamshell 2 x10, green " band  Balance/Neuromuscular Re-Education Activity 6: TA with bridge 2 x 10    Therapeutic Activity  Therapeutic Activity 1: shuttle (B) press 2 x 10, 1 cord  Therapeutic Activity 2: shuttle (U) press 2 x 10 each, 1 red cord  Therapeutic Activity 3: step ups 1 x 10 each    Patient's spiritual, cultural, and educational needs considered and patient agreeable to plan of care and goals.     Assessment & Plan   Assessment: Patient remains very tender over greater trochanter and surrounding musculature.  She is progressed with strengthening exercises and did have pain during step ups and standing hip exercises.  Evaluation/Treatment Tolerance: Patient limited by pain        Plan: continue wiht PT POC and progress as tolerated    Goals:   Active       Long Term Goals       Patient will improve bilateral hip strength to 5/5 to improve tolerance to normal house work activities for self care independence. (Progressing)       Start:  01/30/25    Expected End:  03/17/25            Patient will increase lumbar forward bending to reach ankle level for function of dressing. (Progressing)       Start:  01/30/25    Expected End:  03/17/25            Patient will report a decrease in complaints of low back pain to 1/10 during ADLs. (Progressing)       Start:  01/30/25    Expected End:  03/17/25            Patient will improve FOTO status from 63% to 71% indicating increased functional mobility.  (Progressing)       Start:  01/30/25    Expected End:  03/17/25               Short Term Goals       Patient will be compliant with HEP to promote the independent management of current diagnosis. (Progressing)       Start:  01/30/25    Expected End:  02/17/25            Patient will increase lumbar forward bending to reach mid shin level for function of dressing. (Progressing)       Start:  01/30/25    Expected End:  02/17/25            Patient will improve bilateral hamstring flexibility to (25)deg during 90/90 testing. (Progressing)        Start:  01/30/25    Expected End:  02/17/25            Patient will report a decrease in complaints of low back pain to 4/10 during ADLs. (Progressing)       Start:  01/30/25    Expected End:  02/17/25                Brent Rogers PT

## 2025-02-13 ENCOUNTER — HOSPITAL ENCOUNTER (OUTPATIENT)
Dept: RADIOLOGY | Facility: HOSPITAL | Age: 40
Discharge: HOME | End: 2025-02-13
Attending: STUDENT IN AN ORGANIZED HEALTH CARE EDUCATION/TRAINING PROGRAM
Payer: COMMERCIAL

## 2025-02-13 ENCOUNTER — PATIENT MESSAGE (OUTPATIENT)
Dept: FAMILY MEDICINE | Facility: CLINIC | Age: 40
End: 2025-02-13
Payer: COMMERCIAL

## 2025-02-13 DIAGNOSIS — M25.551 RIGHT HIP PAIN: ICD-10-CM

## 2025-02-13 PROCEDURE — 73521 X-RAY EXAM HIPS BI 2 VIEWS: CPT | Mod: 26,,, | Performed by: RADIOLOGY

## 2025-02-13 PROCEDURE — 73521 X-RAY EXAM HIPS BI 2 VIEWS: CPT | Mod: TC,PO

## 2025-03-21 ENCOUNTER — OFFICE VISIT (OUTPATIENT)
Dept: PAIN MEDICINE | Facility: CLINIC | Age: 40
End: 2025-03-21
Payer: COMMERCIAL

## 2025-03-21 VITALS
SYSTOLIC BLOOD PRESSURE: 133 MMHG | DIASTOLIC BLOOD PRESSURE: 86 MMHG | BODY MASS INDEX: 35.12 KG/M2 | WEIGHT: 210.81 LBS | HEIGHT: 65 IN | RESPIRATION RATE: 17 BRPM | HEART RATE: 98 BPM

## 2025-03-21 DIAGNOSIS — M46.1 SACROILIITIS: Primary | ICD-10-CM

## 2025-03-21 DIAGNOSIS — M70.61 TROCHANTERIC BURSITIS OF RIGHT HIP: ICD-10-CM

## 2025-03-21 PROCEDURE — 99999 PR PBB SHADOW E&M-EST. PATIENT-LVL IV: CPT | Mod: PBBFAC,,, | Performed by: PHYSICAL MEDICINE & REHABILITATION

## 2025-03-21 NOTE — PROGRESS NOTES
Established Patient Chronic Pain Note (follow up Visit)      Chief Complaint:   Chief Complaint   Patient presents with    Follow-up        SUBJECTIVE:    Interval HPI 03/21/2025:  Olga Roger is a 39 y.o. female presents today for follow-up for right lower back and hip pain.  She had tremendous relief with previous right-sided SI joint injection that has performed 3+ months ago in his now starting to wear off.  She also notes that she is starting to have more lateral hip pain as well.  Current pain intensity is 0/10, but when she starts to get up and move her pain can increase to 8/10.  Most notable when she gets from a seated position to a standing position.      Patient denies night fever/night sweats, urinary incontinence, bowel incontinence, significant weight loss, significant motor weakness, and loss of sensations.    Pain Disability Index Review:      3/21/2025     9:58 AM 1/10/2025     9:56 AM 11/15/2024     8:43 AM   Last 3 PDI Scores   Pain Disability Index (PDI) 0 36 59       Interval HPI 01/10/2025:  Olga Roger is a 39 y.o. female presents today for follow-up injection after successfully undergoing right-sided SI joint injection on 12/03/2024.  She reports that this resulted in 90% relief overall.  However, patient still reports that she has occasional sudden jolts of pain in her right buttock area without any noticeable triggers.    Initial HPI 11/15/2024:  Olga Roger is a 39 y.o. female who presents to the clinic for the evaluation of right sided lower back and leg pain.  She was referred by her primary care team for further evaluation and management of this pain.  She has past medical history of depression, hyperlipidemia, hypertension, DM2, gastroparesis, and multiple other medical comorbidities as listed in chart.The pain started a couple of months ago and symptoms have been worsening.The pain is located in the right lumbosacral area and radiates to the right lateral hip and posterior  thigh and occasionally into the right groin.  The pain is described as sharp, stabbing and is rated as 7/10. The pain is rated with a score of  7/10 on the BEST day and a score of 10/10 on the WORST day.  Symptoms interfere with daily activity. The pain is exacerbated by nothing in particular.  The pain is mitigated by nothing.     Non-Pharmacologic Treatments:  Physical Therapy/Home Exercise: yes, VERY LIMITED BENEFIT DUE TO SEVERE PAIN  Ice/Heat:yes  TENS: no  Acupuncture: no  Massage: no  Chiropractic: no    Other: no      Pain Medications:  - Opioids: Tramadol, Percocet, Norco  - Adjuvant Medications: gabapentin, Lyrica, compound cream, Robaxin  - Anti-Coagulants:  none     report:  Reviewed and consistent with medication use as prescribed.    Pain Procedures:   Tenjet on the right hip   Right hip joint injection under ultrasound  Right SI joint injection on 12/03/2024, 90% relief      Imaging:    X-ray right hip 10/22/2024:   No change. There is no fracture or dislocation. Hip joint space is normal. No congenital sequela. Right iliac wing bone island. IUD.     X-ray lumbar spine 10/22/2024:   There is no fracture or malalignment. The disc spaces are well-preserved. No degenerative sequela. Scattered colonic air-fluid levels. No bowel obstruction. No stool retention. IUD. Right iliac wing bone island.     MRI right hip 09/05/2024:  BONES: 1 cm benign incidental sclerotic bone island right iliac wing.  No fracture, marrow edema or suspicious bone lesion. No evidence of avascular necrosis.     RIGHT HIP JOINT: Anatomic alignment. Physiologic amount of joint fluid. Chondral thinning and small partial chondral erosions in the superior joint space with a small full-thickness chondral defect and mild subchondral cystic change of the anterior   acetabular roof.     LABRUM: Incidental anterior inferior sublabral sulcus.  Possible small low-grade partial-thickness linear undersurface tear of the anterior labrum (image  "17 series 10).  Possible low-grade partial undersurface tear of the superior labrum (coronal images   14-17 series 6).     FEMOROACETABULAR IMPINGEMENT ANATOMY: Femoral head-neck morphology is within normal limits. Head-neck alpha angle is normal measuring 53 degrees. No pincer lesions identified.     MUSCLES, TENDONS AND REMAINING MAJOR SUPPORTING SOFT TISSUE STRUCTURES: Mild abductor tendinosis.  Remaining muscles and tendons are within normal limits.         PMHx,PSHx, Social history, and Family history:  I have reviewed the patient's medical, surgical, social, and family history in detail and updated the computerized patient record.        Review of patient's allergies indicates:   Allergen Reactions    [place] Hives       Current Outpatient Medications   Medication Sig    amLODIPine (NORVASC) 5 MG tablet Take 5 mg by mouth.    BD SOY 2ND GEN PEN NEEDLE 32 gauge x 5/32" Ndle use as directed    blood sugar diagnostic Strp To check BG 3 times daily, to use with insurance preferred meter    blood-glucose sensor (DEXCOM G7 SENSOR MISC) 3    blood-glucose sensor (DEXCOM G7 SENSOR) Hui 1 each by Misc.(Non-Drug; Combo Route) route every 10 days.    DEXCOM G7  Misc USE  ONCE DAILY    DOMPERIDONE, BULK, MISC 10 mg by Misc.(Non-Drug; Combo Route) route 3 (three) times daily as needed.    insulin glargine U-100, Lantus, (LANTUS SOLOSTAR U-100 INSULIN) 100 unit/mL (3 mL) InPn pen Inject 30 Units into the skin every evening.    lancets Misc To check BG 3 times daily, to use with insurance preferred meter    magnesium oxide (MAG-OX) 400 mg (241.3 mg magnesium) tablet Take 1 tablet (400 mg total) by mouth once daily.    metoclopramide HCl (REGLAN) 10 MG tablet Take 1 tablet (10 mg total) by mouth 3 (three) times daily before meals.    metoprolol succinate (TOPROL-XL) 50 MG 24 hr tablet Take 1 tablet (50 mg total) by mouth once daily.    omeprazole (PRILOSEC) 40 MG capsule Take 40 mg by mouth.    pregabalin " (LYRICA) 50 MG capsule TAKE 1 CAPSULE BY MOUTH THREE TIMES DAILY    blood-glucose meter kit To check BG 3 times daily, to use with insurance preferred meter    diclofenac 0.15% LIDOcaine 2.25% prilocaine 2.25% topical cream Apply topically 2 (two) times daily as needed (as needed for pain). (Patient not taking: Reported on 3/21/2025)    gabapentin (NEURONTIN) 300 MG capsule Take 1 capsule (300 mg total) by mouth 3 (three) times daily as needed (r hip pain). (Patient not taking: Reported on 3/21/2025)    HYDROcodone-acetaminophen (NORCO) 5-325 mg per tablet Take 1 tablet by mouth every 12 (twelve) hours as needed for Pain. (Patient not taking: Reported on 3/21/2025)    insulin lispro (HUMALOG KWIKPEN INSULIN) 100 unit/mL pen Inject 2-8 Units into the skin every 4 (four) hours. USING CORRECTION SCALE.    methocarbamoL (ROBAXIN) 750 MG Tab Take 1 tablet (750 mg total) by mouth 4 (four) times daily. (Patient not taking: Reported on 3/21/2025)     Current Facility-Administered Medications   Medication    levonorgestreL (MIRENA) 20 mcg/24 hours (7 yrs) 52 mg IUD 1 Intra Uterine Device       Review of Systems     GENERAL:  No weight loss, malaise or fevers.  HEENT:   No recent changes in vision or hearing  NECK:  Negative for lumps, no difficulty with swallowing.  RESPIRATORY:  Negative for cough, wheezing or shortness of breath, patient denies any recent URI.  CARDIOVASCULAR:  Negative for chest pain, leg swelling or palpitations.  GI:  Negative for abdominal discomfort, blood in stools or black stools or change in bowel habits.  MUSCULOSKELETAL:  See HPI.  SKIN:  Negative for lesions, rash, and itching.  PSYCH:  No mood disorder or recent psychosocial stressors.  Patients sleep is not disturbed secondary to pain.  HEMATOLOGY/LYMPHOLOGY:  Negative for prolonged bleeding, bruising easily or swollen nodes.  Patient is not currently taking any anti-coagulants  NEURO:   No history of headaches, syncope, paralysis, seizures or  "tremors.  All other reviewed and negative other than HPI.    OBJECTIVE:    /86   Pulse 98   Resp 17   Ht 5' 5" (1.651 m)   Wt 95.6 kg (210 lb 12.8 oz)   BMI 35.08 kg/m²         Physical Exam    GENERAL: Well appearing, in no acute distress, alert and oriented x3.  PSYCH:  Mood and affect appropriate.  SKIN: Skin color, texture, turgor normal, no rashes or lesions.  HEAD/FACE:  Normocephalic, atraumatic. Cranial nerves grossly intact.  CV: RRR with palpation of the radial artery.  PULM: No evidence of respiratory difficulty, symmetric chest rise.  GI:  Soft and non-tender.  BACK: Straight leg raising in the sitting and supine positions is equivocal to radicular pain. No pain to palpation over the facet joints of the lumbar spine and lumbar paraspinals.  Limited range of motion secondary to pain reproduction. Directional preference:  Pain with both flexion and extension  EXTREMITIES:  No deformities, edema, or skin discoloration. Good capillary refill.  MUSCULOSKELETAL:  Hip and knee provocative maneuvers are unremarkable with the exception of tenderness over the right gluteal tendons..  There is moderate pain with palpation over the sacroiliac joint on the right.  FABERs test is positive on the right.  Gaenslen's positive on the right.  Sacral thrust positive on the right.  FADIRs test is equivocal.   Bilateral upper and lower extremity strength is normal and symmetric.  No atrophy or tone abnormalities are noted.  NEURO: Bilateral upper and lower extremity coordination and muscle stretch reflexes are physiologic and symmetric.  Plantar response are downgoing. No clonus.  No loss of sensation is noted.  GAIT:  Slow, antalgic.      LABS:  Lab Results   Component Value Date    WBC 7.10 12/19/2024    HGB 9.8 (L) 12/19/2024    HCT 34.7 (L) 12/19/2024    MCV 83 12/19/2024     12/19/2024       CMP  Sodium   Date Value Ref Range Status   12/19/2024 140 136 - 145 mmol/L Final     Potassium   Date Value Ref " Range Status   12/19/2024 4.4 3.5 - 5.1 mmol/L Final     Chloride   Date Value Ref Range Status   12/19/2024 109 95 - 110 mmol/L Final     CO2   Date Value Ref Range Status   12/19/2024 22 (L) 23 - 29 mmol/L Final     Glucose   Date Value Ref Range Status   12/19/2024 146 (H) 70 - 110 mg/dL Final     BUN   Date Value Ref Range Status   12/19/2024 23 (H) 6 - 20 mg/dL Final     Creatinine   Date Value Ref Range Status   12/19/2024 1.5 (H) 0.5 - 1.4 mg/dL Final     Calcium   Date Value Ref Range Status   12/19/2024 9.1 8.7 - 10.5 mg/dL Final     Total Protein   Date Value Ref Range Status   06/19/2024 7.6 6.0 - 8.4 g/dL Final     Albumin   Date Value Ref Range Status   08/31/2024 4.4 3.4 - 5.0 g/dL Final   06/19/2024 3.7 3.5 - 5.2 g/dL Final     Total Bilirubin   Date Value Ref Range Status   08/31/2024 0.4 <1.3 mg/dL Final   06/19/2024 0.2 0.1 - 1.0 mg/dL Final     Comment:     For infants and newborns, interpretation of results should be based  on gestational age, weight and in agreement with clinical  observations.    Premature Infant recommended reference ranges:  Up to 24 hours.............<8.0 mg/dL  Up to 48 hours............<12.0 mg/dL  3-5 days..................<15.0 mg/dL  6-29 days.................<15.0 mg/dL       Alkaline Phosphatase   Date Value Ref Range Status   06/19/2024 91 55 - 135 U/L Final     AST   Date Value Ref Range Status   08/31/2024 13 <45 U/L Final   06/19/2024 13 10 - 40 U/L Final     ALT   Date Value Ref Range Status   08/31/2024 14 <46 U/L Final   06/19/2024 6 (L) 10 - 44 U/L Final     Anion Gap   Date Value Ref Range Status   12/19/2024 9 8 - 16 mmol/L Final     eGFR if    Date Value Ref Range Status   09/23/2021 >60.0 >60 mL/min/1.73 m^2 Final     eGFR if non    Date Value Ref Range Status   09/23/2021 >60.0 >60 mL/min/1.73 m^2 Final     Comment:     Calculation used to obtain the estimated glomerular filtration  rate (eGFR) is the CKD-EPI equation.           Lab Results   Component Value Date    HGBA1C 6.3 (H) 12/19/2024             ASSESSMENT: 39 y.o. year old female with lower back and right hip pain, consistent with     1. Sacroiliitis  Case Request-RAD/Other Procedure Area: Right SIJ + Right GT bursa (therapeutic)      2. Trochanteric bursitis of right hip  Case Request-RAD/Other Procedure Area: Right SIJ + Right GT bursa (therapeutic)                PLAN:   - Interventions:    Scheduled for repeat right-sided SI joint along with a right-sided GTB injection under fluoroscopy for diagnostic therapeutic purposes.    S/p right-sided SI joint injection on 12/03/2024, 90% relief for 3+ months    - Anticoagulation use:  None      - Medications: I have stressed the importance of physical activity and a home exercise plan to help with pain and improve health. and Patient can continue with medications for now since they are providing benefits, using them appropriately, and without side effects.         - Therapy:  Refer back to physical therapy to help address gluteal tendinopathy    - Psychological:  Discussed coping mechanisms to help address chronic pain issues    - Labs:  Reviewed    - Imaging: Reviewed available imaging with patient and answered any questions they had regarding study.    - Consults/Referrals:  None at this time    - Records:  Reviewed/Obtain old records from outside physicians and imaging    - Follow up visit: return to clinic in 4-6 weeks post procedure or as needed    - Counseled patient regarding the importance of activity modification and physical therapy    - This condition does not require this patient to take time off of work, and the primary goal of our Pain Management services is to improve the patient's functional capacity.    - Patient Questions: Answered all of the patient's questions regarding diagnosis, therapy, and treatment        The above plan and management options were discussed at length with patient. Patient is in agreement  with the above and verbalized understanding.      Fam Herrera MD  Interventional Pain Management  [facility]    Disclaimer:  This note was prepared using voice recognition system and is likely to have sound alike errors that may have been overlooked even after proof reading.  Please call me with any questions

## 2025-03-31 ENCOUNTER — LAB VISIT (OUTPATIENT)
Dept: LAB | Facility: HOSPITAL | Age: 40
End: 2025-03-31
Attending: STUDENT IN AN ORGANIZED HEALTH CARE EDUCATION/TRAINING PROGRAM
Payer: COMMERCIAL

## 2025-03-31 DIAGNOSIS — D64.9 ANEMIA, UNSPECIFIED TYPE: ICD-10-CM

## 2025-03-31 DIAGNOSIS — E08.65 DIABETES MELLITUS DUE TO UNDERLYING CONDITION, UNCONTROLLED, WITH HYPERGLYCEMIA: ICD-10-CM

## 2025-03-31 DIAGNOSIS — E11.49 TYPE 2 DIABETES MELLITUS WITH OTHER NEUROLOGIC COMPLICATION, WITHOUT LONG-TERM CURRENT USE OF INSULIN: ICD-10-CM

## 2025-03-31 DIAGNOSIS — E11.43 DIABETIC GASTROPARESIS: ICD-10-CM

## 2025-03-31 DIAGNOSIS — E11.65 UNCONTROLLED TYPE 2 DIABETES MELLITUS WITH HYPERGLYCEMIA: ICD-10-CM

## 2025-03-31 DIAGNOSIS — K31.84 DIABETIC GASTROPARESIS: ICD-10-CM

## 2025-03-31 LAB
ANION GAP (OHS): 10 MMOL/L (ref 8–16)
BUN SERPL-MCNC: 22 MG/DL (ref 6–20)
CALCIUM SERPL-MCNC: 9.6 MG/DL (ref 8.7–10.5)
CHLORIDE SERPL-SCNC: 107 MMOL/L (ref 95–110)
CO2 SERPL-SCNC: 21 MMOL/L (ref 23–29)
CREAT SERPL-MCNC: 1.5 MG/DL (ref 0.5–1.4)
EAG (OHS): 151 MG/DL (ref 68–131)
ERYTHROCYTE [DISTWIDTH] IN BLOOD BY AUTOMATED COUNT: 11.7 % (ref 11.5–14.5)
FERRITIN SERPL-MCNC: 770 NG/ML (ref 20–300)
GFR SERPLBLD CREATININE-BSD FMLA CKD-EPI: 46 ML/MIN/1.73/M2
GLUCOSE SERPL-MCNC: 212 MG/DL (ref 70–110)
HBA1C MFR BLD: 6.9 % (ref 4–5.6)
HCT VFR BLD AUTO: 32.3 % (ref 37–48.5)
HGB BLD-MCNC: 9.8 GM/DL (ref 12–16)
IRON SATN MFR SERPL: 24 % (ref 20–50)
IRON SERPL-MCNC: 79 UG/DL (ref 30–160)
MCH RBC QN AUTO: 23.7 PG (ref 27–31)
MCHC RBC AUTO-ENTMCNC: 30.3 G/DL (ref 32–36)
MCV RBC AUTO: 78 FL (ref 82–98)
PLATELET # BLD AUTO: 424 K/UL (ref 150–450)
PMV BLD AUTO: 10.8 FL (ref 9.2–12.9)
POTASSIUM SERPL-SCNC: 4.4 MMOL/L (ref 3.5–5.1)
RBC # BLD AUTO: 4.14 M/UL (ref 4–5.4)
SODIUM SERPL-SCNC: 138 MMOL/L (ref 136–145)
TIBC SERPL-MCNC: 333 UG/DL (ref 250–450)
TRANSFERRIN SERPL-MCNC: 225 MG/DL (ref 200–375)
WBC # BLD AUTO: 6.91 K/UL (ref 3.9–12.7)

## 2025-03-31 PROCEDURE — 83036 HEMOGLOBIN GLYCOSYLATED A1C: CPT

## 2025-03-31 PROCEDURE — 83540 ASSAY OF IRON: CPT

## 2025-03-31 PROCEDURE — 82374 ASSAY BLOOD CARBON DIOXIDE: CPT

## 2025-03-31 PROCEDURE — 82728 ASSAY OF FERRITIN: CPT

## 2025-03-31 PROCEDURE — 36415 COLL VENOUS BLD VENIPUNCTURE: CPT | Mod: PO

## 2025-03-31 PROCEDURE — 85027 COMPLETE CBC AUTOMATED: CPT

## 2025-04-09 NOTE — PRE-PROCEDURE INSTRUCTIONS
Spoke with patient regarding procedure scheduled on 4.17     Arrival time 0930     Has patient been sick with fever or on antibiotics within the last 7 days? No     Does the patient have any open wounds, sores or rashes? No     Does the patient have any recent fractures? no     Has patient received a vaccination within the last 7 days? No     Received the COVID vaccination?      Has the patient stopped all medications as directed? na     Does patient have a pacemaker, defibrillator, or implantable stimulator? No     Does the patient have a ride to and from procedure and someone reliable to remain with patient?  dequint     Is the patient diabetic? yes     Does the patient have sleep apnea? Or use O2 at home? no     Is the patient receiving sedation?      Is the patient instructed to remain NPO beginning at midnight the night before their procedure? yes     Procedure location confirmed with patient? Yes     Covid- Denies signs/symptoms. Instructed to notify PAT/MD if any changes.

## 2025-04-17 ENCOUNTER — HOSPITAL ENCOUNTER (OUTPATIENT)
Facility: HOSPITAL | Age: 40
Discharge: HOME | End: 2025-04-17
Attending: PHYSICAL MEDICINE & REHABILITATION | Admitting: PHYSICAL MEDICINE & REHABILITATION
Payer: COMMERCIAL

## 2025-04-17 VITALS
OXYGEN SATURATION: 99 % | SYSTOLIC BLOOD PRESSURE: 139 MMHG | TEMPERATURE: 98 F | WEIGHT: 209.88 LBS | RESPIRATION RATE: 16 BRPM | DIASTOLIC BLOOD PRESSURE: 86 MMHG | HEART RATE: 88 BPM | BODY MASS INDEX: 34.97 KG/M2 | HEIGHT: 65 IN

## 2025-04-17 DIAGNOSIS — M46.1 SACROILIITIS: Primary | ICD-10-CM

## 2025-04-17 LAB
B-HCG UR QL: NEGATIVE
CTP QC/QA: YES
POCT GLUCOSE: 107 MG/DL (ref 70–110)

## 2025-04-17 PROCEDURE — 27096 INJECT SACROILIAC JOINT: CPT | Mod: RT,,, | Performed by: PHYSICAL MEDICINE & REHABILITATION

## 2025-04-17 PROCEDURE — 77002 NEEDLE LOCALIZATION BY XRAY: CPT | Mod: 26,59,, | Performed by: PHYSICAL MEDICINE & REHABILITATION

## 2025-04-17 PROCEDURE — 25500020 PHARM REV CODE 255: Performed by: PHYSICAL MEDICINE & REHABILITATION

## 2025-04-17 PROCEDURE — 20610 DRAIN/INJ JOINT/BURSA W/O US: CPT | Mod: 59,RT,, | Performed by: PHYSICAL MEDICINE & REHABILITATION

## 2025-04-17 PROCEDURE — 81025 URINE PREGNANCY TEST: CPT | Performed by: PHYSICAL MEDICINE & REHABILITATION

## 2025-04-17 PROCEDURE — 63600175 PHARM REV CODE 636 W HCPCS: Performed by: PHYSICAL MEDICINE & REHABILITATION

## 2025-04-17 PROCEDURE — 20610 DRAIN/INJ JOINT/BURSA W/O US: CPT | Mod: 59,RT | Performed by: PHYSICAL MEDICINE & REHABILITATION

## 2025-04-17 PROCEDURE — 27096 INJECT SACROILIAC JOINT: CPT | Mod: RT | Performed by: PHYSICAL MEDICINE & REHABILITATION

## 2025-04-17 RX ORDER — FENTANYL CITRATE 50 UG/ML
INJECTION, SOLUTION INTRAMUSCULAR; INTRAVENOUS
Status: DISCONTINUED | OUTPATIENT
Start: 2025-04-17 | End: 2025-04-17 | Stop reason: HOSPADM

## 2025-04-17 RX ORDER — BUPIVACAINE HYDROCHLORIDE 2.5 MG/ML
INJECTION, SOLUTION EPIDURAL; INFILTRATION; INTRACAUDAL; PERINEURAL
Status: DISCONTINUED | OUTPATIENT
Start: 2025-04-17 | End: 2025-04-17 | Stop reason: HOSPADM

## 2025-04-17 RX ORDER — METHYLPREDNISOLONE ACETATE 40 MG/ML
INJECTION, SUSPENSION INTRA-ARTICULAR; INTRALESIONAL; INTRAMUSCULAR; SOFT TISSUE
Status: DISCONTINUED | OUTPATIENT
Start: 2025-04-17 | End: 2025-04-17 | Stop reason: HOSPADM

## 2025-04-17 RX ORDER — ONDANSETRON HYDROCHLORIDE 2 MG/ML
4 INJECTION, SOLUTION INTRAVENOUS ONCE AS NEEDED
Status: DISCONTINUED | OUTPATIENT
Start: 2025-04-17 | End: 2025-04-17 | Stop reason: HOSPADM

## 2025-04-17 RX ORDER — MIDAZOLAM HYDROCHLORIDE 1 MG/ML
INJECTION INTRAMUSCULAR; INTRAVENOUS
Status: DISCONTINUED | OUTPATIENT
Start: 2025-04-17 | End: 2025-04-17 | Stop reason: HOSPADM

## 2025-04-17 NOTE — DISCHARGE SUMMARY
"Discharge Note  Short Stay      SUMMARY     Admit Date: 4/17/2025    Attending Physician: Fam Herrera MD        Discharge Physician: Fam Herrera MD        Discharge Date: 4/17/2025 10:08 AM    Procedure(s) (LRB):  Right SIJ + Right GT bursa (therapeutic) (Right)    Final Diagnosis: Sacroiliitis [M46.1]  Trochanteric bursitis of right hip [M70.61]    Disposition: Home or self care    Patient Instructions:   Current Discharge Medication List        CONTINUE these medications which have NOT CHANGED    Details   amLODIPine (NORVASC) 5 MG tablet Take 5 mg by mouth.      insulin glargine U-100, Lantus, (LANTUS SOLOSTAR U-100 INSULIN) 100 unit/mL (3 mL) InPn pen Inject 30 Units into the skin every evening.  Qty: 27 mL, Refills: 5    Associated Diagnoses: Uncontrolled type 2 diabetes mellitus with hyperglycemia      metoclopramide HCl (REGLAN) 10 MG tablet Take 1 tablet (10 mg total) by mouth 3 (three) times daily before meals.  Qty: 90 tablet, Refills: 3    Associated Diagnoses: Diabetic gastroparesis      metoprolol succinate (TOPROL-XL) 50 MG 24 hr tablet Take 1 tablet (50 mg total) by mouth once daily.  Qty: 90 tablet, Refills: 3    Comments: .  Associated Diagnoses: Tachycardia      omeprazole (PRILOSEC) 40 MG capsule Take 40 mg by mouth.      pregabalin (LYRICA) 50 MG capsule TAKE 1 CAPSULE BY MOUTH THREE TIMES DAILY  Qty: 90 capsule, Refills: 2    Associated Diagnoses: Right hip pain; Chronic right-sided low back pain with right-sided sciatica      BD SOY 2ND GEN PEN NEEDLE 32 gauge x 5/32" Ndle use as directed      blood sugar diagnostic Strp To check BG 3 times daily, to use with insurance preferred meter  Qty: 100 each, Refills: 0    Associated Diagnoses: Uncontrolled type 2 diabetes mellitus with hyperglycemia      blood-glucose meter kit To check BG 3 times daily, to use with insurance preferred meter  Qty: 100 each, Refills: 1    Associated Diagnoses: Uncontrolled type 2 diabetes mellitus with " hyperglycemia      !! blood-glucose sensor (DEXCOM G7 SENSOR MISC) 3      !! blood-glucose sensor (DEXCOM G7 SENSOR) Hui 1 each by Misc.(Non-Drug; Combo Route) route every 10 days.  Qty: 9 each, Refills: 3    Associated Diagnoses: Diabetes mellitus type 2 in nonobese      DEXCOM G7  Misc USE  ONCE DAILY  Qty: 1 each, Refills: 0    Associated Diagnoses: Type 2 diabetes mellitus with hyperglycemia, without long-term current use of insulin      diclofenac 0.15% LIDOcaine 2.25% prilocaine 2.25% topical cream Apply topically 2 (two) times daily as needed (as needed for pain).  Qty: 480 g, Refills: 1    Associated Diagnoses: Right hip pain; Pain of right hip      DOMPERIDONE, BULK, MISC 10 mg by Misc.(Non-Drug; Combo Route) route 3 (three) times daily as needed.      gabapentin (NEURONTIN) 300 MG capsule Take 1 capsule (300 mg total) by mouth 3 (three) times daily as needed (r hip pain).  Qty: 90 capsule, Refills: 11    Associated Diagnoses: Right hip pain; Pain of right hip      HYDROcodone-acetaminophen (NORCO) 5-325 mg per tablet Take 1 tablet by mouth every 12 (twelve) hours as needed for Pain.  Qty: 60 tablet, Refills: 0    Comments: Quantity prescribed more than 7 day supply? Yes, quantity medically necessary  Associated Diagnoses: Right hip pain; Pain of right hip      insulin lispro (HUMALOG KWIKPEN INSULIN) 100 unit/mL pen Inject 2-8 Units into the skin every 4 (four) hours. USING CORRECTION SCALE.  Qty: 14.4 mL, Refills: 11    Associated Diagnoses: Type 2 diabetes mellitus with hyperglycemia, without long-term current use of insulin      lancets Misc To check BG 3 times daily, to use with insurance preferred meter  Qty: 100 each, Refills: 1    Associated Diagnoses: Uncontrolled type 2 diabetes mellitus with hyperglycemia      magnesium oxide (MAG-OX) 400 mg (241.3 mg magnesium) tablet Take 1 tablet (400 mg total) by mouth once daily.  Qty: 30 tablet, Refills: 2    Associated Diagnoses: Hypomagnesemia       methocarbamoL (ROBAXIN) 750 MG Tab Take 1 tablet (750 mg total) by mouth 4 (four) times daily.  Qty: 40 tablet, Refills: 1    Associated Diagnoses: Right hip pain       !! - Potential duplicate medications found. Please discuss with provider.              Discharge Diagnosis: Sacroiliitis [M46.1]  Trochanteric bursitis of right hip [M70.61]  Condition on Discharge: Stable with no complications to procedure   Diet on Discharge: Same as before.  Activity: as per instruction sheet.  Discharge to: Home with a responsible adult.  Follow up: 2-4 weeks       Please call the office at [phone] if you experience any weakness or loss of sensation, fever > 101.5, pain uncontrolled with oral medications, persistent nausea/vomiting/or diarrhea, redness or drainage from the incisions, or any other worrisome concerns. If physician on call was not reached or could not communicate with our office for any reason please go to the nearest emergency department

## 2025-04-17 NOTE — OP NOTE
Time-out taken to identify patient and procedure side prior to starting the procedure.     04/17/2025      PROCEDURE:  1) Right greater trochanteric bursa injection    2) Right sacroiliac joint injection                            REASON FOR PROCEDURE:   Sacroiliitis [M46.1]  Greater trochanteric bursitis[M70.61]    PHYSICIAN: Fam Herrera MD  ASSISTANTS: None    SEDATION: Conscious sedation provided by M.D    The patient was monitored with continuous pulse oximetry, EKG, and intermittent blood pressure monitors, immediately prior to administration of sedation.  The patient was hemodynamically stable throughout the entire process was responsive to voice, and breathing spontaneously.  Supplemental O2 was provided at 2L/min via nasal cannula.  Patient was comfortable for the duration of the procedure.     There was a total of 2mg IV Midazolam and 50mcg Fentanyl titrated for the procedure    Total sedation time was >10minutes and <20minutes          MEDICATIONS INJECTED: 1mL 40mg/ml Depo-Medrol and 4mL Bupivacaine 0.25% into each site    LOCAL ANESTHETIC USED: Xylocaine 1% 6ml     ESTIMATED BLOOD LOSS: None.   COMPLICATIONS: None.     TECHNIQUE:   Greater trochanteric bursa injection:  The area overlying the greater trochanteric bursa was identified using fluoroscopy, and the area overlying the skin was prepped and draped in usual sterile fashion. Local Xylocaine was injected by raising a wheel and going down to the periosteum using a 27-gauge hypodermic needle. A 5 inch 22-gauge spinal needle was introduce into the Right greater trochanteric bursa Negative pressure applied to confirm no intravascular placement. Omnipaque was injected to confirm placement and to confirm that there was no vascular runoff. The medication was then injected slowly.  Displacement of the contrast after injection of the medication confirmed that the medication went into the area of the greater trochanteric bursa    Sacroiliac joint  injection:   Laying in the prone position, the patient was prepped and draped in the usual sterile fashion using ChloraPrep and fenestrated drape.  The area was determined under fluoroscopy.  Local Xylocaine was injected by raising a wheel and going down to the periosteum using a 27-gauge hypodermic needle.  The 3.5 inch 22-gauge spinal needle was introduce into the Right sacroiliac joint.  Negative pressure applied to confirm no intravascular placement.  Omnipaque was injected to confirm placement and to confirm that there was no vascular runoff.  The medication was then injected slowly.  The patient tolerated the procedure well.                       The patient was monitored for approximately 30 minutes after the procedure. Patient was given post procedure and discharge instructions to follow at home. We will see the patient back in two weeks or the patient may call to inform of status. The patient was discharged in a stable condition

## 2025-04-17 NOTE — H&P
"HPI  Patient presenting for Procedure(s) (LRB):  Right SIJ + Right GT bursa (therapeutic) (Right)       No health changes since previous encounter    Past Medical History:   Diagnosis Date    Diabetes mellitus type 2 in nonobese 10/31/2018    A1c >14% as of 11/1/19; Stopped metformin 2/2 diarrhea -> Lantus & atorvastatin on 11/1/19    Hypertriglyceridemia 11/01/2019    TG 1,261 mg/dL, for which we will consider fenofibrate if atorvastatin doesn't causes significant transaminitis (will also consider Lovaza)    Overweight (BMI 25.0-29.9)      Past Surgical History:   Procedure Laterality Date    INJECTION OF ANESTHETIC AGENT INTO SACROILIAC JOINT Right 12/3/2024    Procedure: Right SIJ Injection;  Surgeon: Fam Herrera MD;  Location: [facility];  Service: Pain Management;  Laterality: Right;     Review of patient's allergies indicates:   Allergen Reactions    Strawberry Hives        Medications Ordered Prior to Encounter[1]     PMHx, PSHx, Allergies, Medications reviewed in epic    ROS negative except pain complaints in HPI    OBJECTIVE:    BP (!) 162/97 (BP Location: Right arm, Patient Position: Sitting)   Pulse 93   Temp 97.9 °F (36.6 °C) (Temporal)   Resp 16   Ht 5' 5" (1.651 m)   Wt 95.2 kg (209 lb 14.1 oz)   LMP  (LMP Unknown) Comment: LMP over 2 years  SpO2 100%   Breastfeeding No   BMI 34.93 kg/m²     PHYSICAL EXAMINATION:    GENERAL: Well appearing, in no acute distress, alert and oriented x3.  PSYCH:  Mood and affect appropriate.  SKIN: Skin color, texture, turgor normal, no rashes or lesions which will impact the procedure.  CV: RRR with palpation of the radial artery.  PULM: No evidence of respiratory difficulty, symmetric chest rise. Clear to auscultation.  NEURO: Cranial nerves grossly intact.    Plan:    Proceed with procedure as planned Procedure(s) (LRB):  Right SIJ + Right GT bursa (therapeutic) (Right)    Fam Herrera MD  04/17/2025                 [1]   No current " "facility-administered medications on file prior to encounter.     Current Outpatient Medications on File Prior to Encounter   Medication Sig Dispense Refill    amLODIPine (NORVASC) 5 MG tablet Take 5 mg by mouth.      insulin glargine U-100, Lantus, (LANTUS SOLOSTAR U-100 INSULIN) 100 unit/mL (3 mL) InPn pen Inject 30 Units into the skin every evening. 27 mL 5    metoclopramide HCl (REGLAN) 10 MG tablet Take 1 tablet (10 mg total) by mouth 3 (three) times daily before meals. 90 tablet 3    metoprolol succinate (TOPROL-XL) 50 MG 24 hr tablet Take 1 tablet (50 mg total) by mouth once daily. 90 tablet 3    omeprazole (PRILOSEC) 40 MG capsule Take 40 mg by mouth.      pregabalin (LYRICA) 50 MG capsule TAKE 1 CAPSULE BY MOUTH THREE TIMES DAILY 90 capsule 2    BD SOY 2ND GEN PEN NEEDLE 32 gauge x 5/32" Ndle use as directed      blood sugar diagnostic Strp To check BG 3 times daily, to use with insurance preferred meter 100 each 0    blood-glucose meter kit To check BG 3 times daily, to use with insurance preferred meter 100 each 1    blood-glucose sensor (DEXCOM G7 SENSOR MISC) 3      blood-glucose sensor (DEXCOM G7 SENSOR) Hui 1 each by Misc.(Non-Drug; Combo Route) route every 10 days. 9 each 3    DEXCOM G7  Misc USE  ONCE DAILY 1 each 0    diclofenac 0.15% LIDOcaine 2.25% prilocaine 2.25% topical cream Apply topically 2 (two) times daily as needed (as needed for pain). (Patient not taking: Reported on 3/21/2025) 480 g 1    DOMPERIDONE, BULK, MISC 10 mg by Misc.(Non-Drug; Combo Route) route 3 (three) times daily as needed.      gabapentin (NEURONTIN) 300 MG capsule Take 1 capsule (300 mg total) by mouth 3 (three) times daily as needed (r hip pain). (Patient not taking: Reported on 3/21/2025) 90 capsule 11    HYDROcodone-acetaminophen (NORCO) 5-325 mg per tablet Take 1 tablet by mouth every 12 (twelve) hours as needed for Pain. (Patient not taking: Reported on 3/21/2025) 60 tablet 0    insulin lispro (HUMALOG " KWIKPEN INSULIN) 100 unit/mL pen Inject 2-8 Units into the skin every 4 (four) hours. USING CORRECTION SCALE. 14.4 mL 11    lancets Misc To check BG 3 times daily, to use with insurance preferred meter 100 each 1    magnesium oxide (MAG-OX) 400 mg (241.3 mg magnesium) tablet Take 1 tablet (400 mg total) by mouth once daily. 30 tablet 2    methocarbamoL (ROBAXIN) 750 MG Tab Take 1 tablet (750 mg total) by mouth 4 (four) times daily. (Patient not taking: Reported on 3/21/2025) 40 tablet 1

## 2025-04-17 NOTE — DISCHARGE INSTRUCTIONS

## 2025-04-24 NOTE — TELEPHONE ENCOUNTER
----- Message from aMy Blankenship sent at 2/1/2024  8:24 AM CST -----  Regarding: Referral Request  Contact: Patient  Type:  Patient Requesting Referral    Who Called:Patient   Does the patient already have the specialty appointment scheduled?:no   If yes, what is the date of that appointment?: n/a   Referral to What Specialty: Diabetes Management   Reason for Referral: diabetes type 2   Does the patient want the referral with a specific physician?:Talya De León   [facility]   Patient Requesting a Response?: yes   Would the patient rather a call back or a response via [facility]? Call back   Best Call Back Number:  [phone]  Additional Information: Patient is requesting to see a physician in diabetes management Please Assist           Adama Bess MD Responsible Provider: Adam Abbott MD    Anesthesia Type: general ASA Status: 3            Anesthesia Type: No value filed.    Kadie Phase I: Kadie Score: 8    Kadie Phase II:      Anesthesia Post Evaluation    Patient location during evaluation: PACU  Patient participation: complete - patient participated  Level of consciousness: awake and alert  Pain score: 3  Airway patency: patent  Nausea & Vomiting: no nausea and no vomiting  Cardiovascular status: blood pressure returned to baseline  Respiratory status: acceptable  Hydration status: euvolemic  Pain management: adequate    No notable events documented.   The patient is a 20y Female complaining of see chief complaint quote.

## 2025-05-08 ENCOUNTER — OFFICE VISIT (OUTPATIENT)
Dept: FAMILY MEDICINE | Facility: CLINIC | Age: 40
End: 2025-05-08
Payer: COMMERCIAL

## 2025-05-08 VITALS
HEIGHT: 65 IN | BODY MASS INDEX: 34.99 KG/M2 | WEIGHT: 210 LBS | TEMPERATURE: 98 F | OXYGEN SATURATION: 98 % | RESPIRATION RATE: 18 BRPM | DIASTOLIC BLOOD PRESSURE: 89 MMHG | HEART RATE: 86 BPM | SYSTOLIC BLOOD PRESSURE: 134 MMHG

## 2025-05-08 DIAGNOSIS — E66.9 OBESITY (BMI 30-39.9): ICD-10-CM

## 2025-05-08 DIAGNOSIS — Z79.4 TYPE 2 DIABETES MELLITUS WITH HYPERGLYCEMIA, WITH LONG-TERM CURRENT USE OF INSULIN: ICD-10-CM

## 2025-05-08 DIAGNOSIS — D64.9 NORMOCYTIC ANEMIA: ICD-10-CM

## 2025-05-08 DIAGNOSIS — E11.3313 TYPE 2 DIABETES MELLITUS WITH BOTH EYES AFFECTED BY MODERATE NONPROLIFERATIVE RETINOPATHY AND MACULAR EDEMA, WITH LONG-TERM CURRENT USE OF INSULIN: Primary | ICD-10-CM

## 2025-05-08 DIAGNOSIS — Z79.4 TYPE 2 DIABETES MELLITUS WITH BOTH EYES AFFECTED BY MODERATE NONPROLIFERATIVE RETINOPATHY AND MACULAR EDEMA, WITH LONG-TERM CURRENT USE OF INSULIN: Primary | ICD-10-CM

## 2025-05-08 DIAGNOSIS — R22.1 ENLARGEMENT OF NECK: ICD-10-CM

## 2025-05-08 DIAGNOSIS — E11.65 TYPE 2 DIABETES MELLITUS WITH HYPERGLYCEMIA, WITH LONG-TERM CURRENT USE OF INSULIN: ICD-10-CM

## 2025-05-08 PROBLEM — E11.9 DIABETES: Status: RESOLVED | Noted: 2018-10-31 | Resolved: 2025-05-08

## 2025-05-08 PROCEDURE — 1160F RVW MEDS BY RX/DR IN RCRD: CPT | Mod: CPTII,S$GLB,, | Performed by: STUDENT IN AN ORGANIZED HEALTH CARE EDUCATION/TRAINING PROGRAM

## 2025-05-08 PROCEDURE — 3008F BODY MASS INDEX DOCD: CPT | Mod: CPTII,S$GLB,, | Performed by: STUDENT IN AN ORGANIZED HEALTH CARE EDUCATION/TRAINING PROGRAM

## 2025-05-08 PROCEDURE — 3075F SYST BP GE 130 - 139MM HG: CPT | Mod: CPTII,S$GLB,, | Performed by: STUDENT IN AN ORGANIZED HEALTH CARE EDUCATION/TRAINING PROGRAM

## 2025-05-08 PROCEDURE — 3079F DIAST BP 80-89 MM HG: CPT | Mod: CPTII,S$GLB,, | Performed by: STUDENT IN AN ORGANIZED HEALTH CARE EDUCATION/TRAINING PROGRAM

## 2025-05-08 PROCEDURE — 99999 PR PBB SHADOW E&M-EST. PATIENT-LVL V: CPT | Mod: PBBFAC,,, | Performed by: STUDENT IN AN ORGANIZED HEALTH CARE EDUCATION/TRAINING PROGRAM

## 2025-05-08 PROCEDURE — 1159F MED LIST DOCD IN RCRD: CPT | Mod: CPTII,S$GLB,, | Performed by: STUDENT IN AN ORGANIZED HEALTH CARE EDUCATION/TRAINING PROGRAM

## 2025-05-08 PROCEDURE — G2211 COMPLEX E/M VISIT ADD ON: HCPCS | Mod: S$GLB,,, | Performed by: STUDENT IN AN ORGANIZED HEALTH CARE EDUCATION/TRAINING PROGRAM

## 2025-05-08 PROCEDURE — 3044F HG A1C LEVEL LT 7.0%: CPT | Mod: CPTII,S$GLB,, | Performed by: STUDENT IN AN ORGANIZED HEALTH CARE EDUCATION/TRAINING PROGRAM

## 2025-05-08 PROCEDURE — 99214 OFFICE O/P EST MOD 30 MIN: CPT | Mod: S$GLB,,, | Performed by: STUDENT IN AN ORGANIZED HEALTH CARE EDUCATION/TRAINING PROGRAM

## 2025-05-08 RX ORDER — INSULIN LISPRO 100 [IU]/ML
2-8 INJECTION, SOLUTION INTRAVENOUS; SUBCUTANEOUS EVERY 4 HOURS
Qty: 14.4 ML | Refills: 11 | Status: SHIPPED | OUTPATIENT
Start: 2025-05-08 | End: 2026-05-08

## 2025-05-08 NOTE — PROGRESS NOTES
1. Type 2 diabetes mellitus with both eyes affected by moderate nonproliferative retinopathy and macular edema, with long-term current use of insulin  Overview:  Hx of retinopathy s/p improvement with better glucose control  Annual opto Follow up      Orders:  -     insulin lispro (HUMALOG KWIKPEN INSULIN) 100 unit/mL pen; Inject 2-8 Units into the skin every 4 (four) hours. USING CORRECTION SCALE. Check sugars before meals (breakfast, lunch, and dinner). Follow the sliding scale for meal-time/as needed insulin: BG Under 160=0 unit 160-200=2 units 201-250=4 units 251-300=6 units 301-350=8 units 351-400=10 units over 400=12 units  Dispense: 14.4 mL; Refill: 11    2. Type 2 diabetes mellitus with hyperglycemia, with long-term current use of insulin  Overview:  Well controlled; however, she reports BG have been elevated 2/2 recent intra-articular steroid inj  - SSI  Lab Results   Component Value Date    HGBA1C 6.9 (H) 03/31/2025       Hypoglycemic Events: none     -current meds:   Diabetes Medications              insulin glargine U-100, Lantus, (LANTUS SOLOSTAR U-100 INSULIN) 100 unit/mL (3 mL) InPn pen Inject 30 Units into the skin every evening.    insulin lispro (HUMALOG KWIKPEN INSULIN) 100 unit/mL pen Inject 2-8 Units into the skin every 4 (four) hours. USING CORRECTION SCALE.          -on statin:   -on ACE-I/ARB:   Hypertension Medications              amLODIPine (NORVASC) 5 MG tablet Take 5 mg by mouth.    metoprolol succinate (TOPROL-XL) 50 MG 24 hr tablet Take 1 tablet (50 mg total) by mouth once daily.          -counseling provided on importance of diabetic diet and medication compliance in order to treat diabetes  -discussed diabetes disease course and potential complications  Follow up 3 months         3. Obesity (BMI 30-39.9)  Overview:  Wt Readings from Last 3 Encounters:   05/08/25 1239 95.3 kg (210 lb)   04/17/25 0902 95.2 kg (209 lb 14.1 oz)   03/21/25 0953 95.6 kg (210 lb 12.8 oz)   Start contrave      Diabetes Medications               insulin (LANTUS SOLOSTAR U-100 INSULIN) glargine 100 units/mL SubQ pen Inject 30 Units into the skin every evening.    insulin lispro (HUMALOG KWIKPEN INSULIN) 100 unit/mL pen Inject 2-8 Units into the skin every 4 (four) hours. USING CORRECTION SCALE.            General weight loss/lifestyle modification strategies discussed: limit sugary drinks, exercise 3-5x per week  Informal exercise measures discussed, e.g. taking stairs instead of elevator.            Orders:  -     naltrexone-bupropion (CONTRAVE) 8-90 mg TbSR; Take 1 tablet by mouth 2 (two) times daily.  Dispense: 60 tablet; Refill: 11    4. Normocytic anemia  -     Ambulatory referral/consult to Endo Procedure ; Future; Expected date: 05/09/2025    5. Enlargement of neck  -     US Soft Tissue Head Neck; Future; Expected date: 05/08/2025       SICKLE-CELL TRAIT:  - Assessed that the patient has sickle-cell trait, which is a factor in anemia diagnosis.  - Evaluated that the patient's iron levels are low, and anemia is suspected to be due to sickle-cell trait.    RETINAL DISORDERS:  - Evaluated that the patient had previous swelling and bleeding behind the eyes, which has improved with better glucose control.  - Educated patient on the relationship between glucose control and eye health.    ANEMIA:  - Ordered colonoscopy to investigate cause of anemia, considering both iron deficiency and sickle cell trait as potential factors.    WEIGHT MANAGEMENT:  - Started Contrave twice daily for weight loss to reduce appetite and food preoccupation.  - Discussed exercise goals: patient to walk 2 miles daily (weather permitting), aim for 10,000 steps per day (with education that 2,000 steps = 1 mile), and get up and move every hour at work (e.g., walk in place.    INTRAUTERINE DEVICE:  - Noted that the patient has a Mirena IUD, which has stopped her periods for the last 2-3 years.    ADDITIONAL ASSESSMENTS AND  RECOMMENDATIONS:  - Ordered thyroid ultrasound due to observed ant neck thickness.  - Recommend increasing hydration as per nephrologist's recommendation.    FOLLOW-UP:  - Follow up in 3 months to assess response to Contrave.            Saskia Albert MD  _________________________________________________________________________      Patient ID: Olga Roger is a 39 y.o. female.    History of Present Illness    CHIEF COMPLAINT:  Patient presents today for insulin refill and weight management concerns    WEIGHT MANAGEMENT:  She reports significant weight gain of approximately 100 lbs since a period of illness, with current weight at 210 lbs. She expresses concerns about uncontrolled appetite, stating she is eating excessively.    DIABETES:  Her blood sugars are running in the 200s with morning blood sugar around 130. She is currently on Lantus 30 units and has a sliding scale insulin regimen but reports lack of clear instructions on proper dosing. She uses a Dexcom continuous glucose monitor. Recent steroid injections may be contributing to elevated blood sugar.    RECENT PROCEDURES:  She received steroid injections in hip and back on April 17th.    OPHTHALMOLOGIC HISTORY:  She has history of swelling and bleeding behind both eyes, with asymmetric severity. The bleeding has resolved and swelling has decreased.    MEDICAL HISTORY:  She has history of gastroparesis and Sickle Cell trait.    REPRODUCTIVE HEALTH:  She has had Mirena IUD placement 2-3 years ago.          Past medical histories reviewed, including past medical, surgical, family and social histories.      Medications Ordered Prior to Encounter[1]    Review of Systems   12 point review of systems negative except for listed in HPI.     Objective:    Nursing note and vitals reviewed.  Vitals:    05/08/25 1239   BP: 134/89   Pulse: 86   Resp: 18   Temp: 98.1 °F (36.7 °C)     Body mass index is 34.95 kg/m².     Physical Exam   Constitutional: oriented to  "person, place, and time. well-developed and well-nourished. No distress.   HENT: WNL  Head: Normocephalic and atraumatic.   Eyes: EOM are normal.   Neck: Normal range of motion. Neck supple. Enlarged ant neck, no lad  Cardiovascular: Normal rate  Pulmonary/Chest: Effort normal. No respiratory distress.   GI: soft, non distended, no ttp, no rebound/guarding  Musculoskeletal: Normal range of motion. no edema.   Neurological: CN II-XII intact  Skin: warm and dry.   Psychiatric: normal mood and affect. behavior is normal.                 We Offer Telehealth & Same Day Appointments!   Book your Telehealth appointment with me through my nurse or   Clinic appointments on [employer]!  [phone]     To Schedule appointments online, go to [employer]: [email or web address]       Visit today included increased complexity associated with the care of the episodic problem addressed and managing the longitudinal care of the patient due to the serious and/or complex managed problem(s) as per problem list.     This note was generated with the assistance of ambient listening technology. Verbal consent was obtained by the patient and accompanying visitor(s) for the recording of patient appointment to facilitate this note. I attest to having reviewed and edited the generated note for accuracy, though some syntax or spelling errors may persist. Please contact the author of this note for any clarification.              [1]   Current Outpatient Medications on File Prior to Visit   Medication Sig Dispense Refill    amLODIPine (NORVASC) 5 MG tablet Take 5 mg by mouth.      BD SOY 2ND GEN PEN NEEDLE 32 gauge x 5/32" Ndle use as directed      blood sugar diagnostic Strp To check BG 3 times daily, to use with insurance preferred meter 100 each 0    blood-glucose meter kit To check BG 3 times daily, to use with insurance preferred meter 100 each 1    blood-glucose sensor (DEXCOM G7 SENSOR MISC) 3      blood-glucose " sensor (DEXCOM G7 SENSOR) Hui 1 each by Misc.(Non-Drug; Combo Route) route every 10 days. 9 each 3    DEXCOM G7  Misc USE  ONCE DAILY 1 each 0    DOMPERIDONE, BULK, MISC 10 mg by Misc.(Non-Drug; Combo Route) route 3 (three) times daily as needed.      insulin glargine U-100, Lantus, (LANTUS SOLOSTAR U-100 INSULIN) 100 unit/mL (3 mL) InPn pen Inject 30 Units into the skin every evening. 27 mL 5    lancets Misc To check BG 3 times daily, to use with insurance preferred meter 100 each 1    metoclopramide HCl (REGLAN) 10 MG tablet Take 1 tablet (10 mg total) by mouth 3 (three) times daily before meals. 90 tablet 3    metoprolol succinate (TOPROL-XL) 50 MG 24 hr tablet Take 1 tablet (50 mg total) by mouth once daily. 90 tablet 3    omeprazole (PRILOSEC) 40 MG capsule Take 40 mg by mouth.      pregabalin (LYRICA) 50 MG capsule TAKE 1 CAPSULE BY MOUTH THREE TIMES DAILY 90 capsule 2    [DISCONTINUED] insulin lispro (HUMALOG KWIKPEN INSULIN) 100 unit/mL pen Inject 2-8 Units into the skin every 4 (four) hours. USING CORRECTION SCALE. 14.4 mL 11    [DISCONTINUED] diclofenac 0.15% LIDOcaine 2.25% prilocaine 2.25% topical cream Apply topically 2 (two) times daily as needed (as needed for pain). (Patient not taking: Reported on 5/8/2025) 480 g 1    [DISCONTINUED] gabapentin (NEURONTIN) 300 MG capsule Take 1 capsule (300 mg total) by mouth 3 (three) times daily as needed (r hip pain). (Patient not taking: Reported on 5/8/2025) 90 capsule 11    [DISCONTINUED] HYDROcodone-acetaminophen (NORCO) 5-325 mg per tablet Take 1 tablet by mouth every 12 (twelve) hours as needed for Pain. (Patient not taking: Reported on 5/8/2025) 60 tablet 0    [DISCONTINUED] magnesium oxide (MAG-OX) 400 mg (241.3 mg magnesium) tablet Take 1 tablet (400 mg total) by mouth once daily. (Patient not taking: Reported on 5/8/2025) 30 tablet 2    [DISCONTINUED] methocarbamoL (ROBAXIN) 750 MG Tab Take 1 tablet (750 mg total) by mouth 4 (four) times daily.  (Patient not taking: Reported on 5/8/2025) 40 tablet 1     Current Facility-Administered Medications on File Prior to Visit   Medication Dose Route Frequency Provider Last Rate Last Admin    levonorgestreL (MIRENA) 20 mcg/24 hours (7 yrs) 52 mg IUD 1 Intra Uterine Device  1 Intra Uterine Device Intrauterine  Tino Garcia NP   1 Intra Uterine Device at 11/23/21 1400

## 2025-05-08 NOTE — PATIENT INSTRUCTIONS
Check sugars before meals (breakfast, lunch, and dinner).   Follow the sliding scale for meal-time/as needed insulin:  BG Under 160=0 unit  160-200=2 units  201-250=4 units  251-300=6 units  301-350=8 units  351-400=10 units  over 400=12 units

## 2025-05-12 ENCOUNTER — HOSPITAL ENCOUNTER (OUTPATIENT)
Dept: PREADMISSION TESTING | Facility: HOSPITAL | Age: 40
Discharge: HOME | End: 2025-05-12
Attending: INTERNAL MEDICINE
Payer: COMMERCIAL

## 2025-05-12 DIAGNOSIS — D64.9 NORMOCYTIC ANEMIA: Primary | ICD-10-CM

## 2025-05-12 RX ORDER — POLYETHYLENE GLYCOL 3350, SODIUM SULFATE ANHYDROUS, SODIUM BICARBONATE, SODIUM CHLORIDE, POTASSIUM CHLORIDE 236; 22.74; 6.74; 5.86; 2.97 G/4L; G/4L; G/4L; G/4L; G/4L
4 POWDER, FOR SOLUTION ORAL ONCE
Qty: 4000 ML | Refills: 0 | Status: SHIPPED | OUTPATIENT
Start: 2025-05-12 | End: 2025-05-12

## 2025-05-26 ENCOUNTER — HOSPITAL ENCOUNTER (OUTPATIENT)
Dept: RADIOLOGY | Facility: HOSPITAL | Age: 40
Discharge: HOME | End: 2025-05-26
Attending: STUDENT IN AN ORGANIZED HEALTH CARE EDUCATION/TRAINING PROGRAM
Payer: COMMERCIAL

## 2025-05-26 DIAGNOSIS — R22.1 ENLARGEMENT OF NECK: ICD-10-CM

## 2025-05-26 PROCEDURE — 76536 US EXAM OF HEAD AND NECK: CPT | Mod: 26,,, | Performed by: RADIOLOGY

## 2025-05-26 PROCEDURE — 76536 US EXAM OF HEAD AND NECK: CPT | Mod: TC,PO

## 2025-05-27 ENCOUNTER — HOSPITAL ENCOUNTER (OUTPATIENT)
Dept: ENDOSCOPY | Facility: HOSPITAL | Age: 40
Discharge: HOME | End: 2025-05-27
Attending: FAMILY MEDICINE | Admitting: FAMILY MEDICINE
Payer: COMMERCIAL

## 2025-05-27 ENCOUNTER — ANESTHESIA (OUTPATIENT)
Dept: ENDOSCOPY | Facility: HOSPITAL | Age: 40
End: 2025-05-27
Payer: COMMERCIAL

## 2025-05-27 ENCOUNTER — ANESTHESIA EVENT (OUTPATIENT)
Dept: ENDOSCOPY | Facility: HOSPITAL | Age: 40
End: 2025-05-27
Payer: COMMERCIAL

## 2025-05-27 DIAGNOSIS — D64.9 NORMOCYTIC ANEMIA: ICD-10-CM

## 2025-05-27 DIAGNOSIS — K63.5 POLYP OF COLON, UNSPECIFIED PART OF COLON, UNSPECIFIED TYPE: ICD-10-CM

## 2025-05-27 DIAGNOSIS — D50.9 IRON DEFICIENCY ANEMIA, UNSPECIFIED IRON DEFICIENCY ANEMIA TYPE: Primary | ICD-10-CM

## 2025-05-27 PROBLEM — D50.0 IRON DEFICIENCY ANEMIA DUE TO CHRONIC BLOOD LOSS: Status: ACTIVE | Noted: 2025-05-27

## 2025-05-27 PROBLEM — K20.90 ESOPHAGITIS: Status: RESOLVED | Noted: 2024-04-01 | Resolved: 2025-05-27

## 2025-05-27 LAB
B-HCG UR QL: NEGATIVE
CTP QC/QA: YES
GLUCOSE SERPL-MCNC: 211 MG/DL (ref 70–110)
POCT GLUCOSE: 211 MG/DL (ref 70–110)

## 2025-05-27 PROCEDURE — 43239 EGD BIOPSY SINGLE/MULTIPLE: CPT | Mod: 51,,, | Performed by: FAMILY MEDICINE

## 2025-05-27 PROCEDURE — 45380 COLONOSCOPY AND BIOPSY: CPT | Mod: ,,, | Performed by: FAMILY MEDICINE

## 2025-05-27 PROCEDURE — 37000008 HC ANESTHESIA 1ST 15 MINUTES

## 2025-05-27 PROCEDURE — 81025 URINE PREGNANCY TEST: CPT | Performed by: FAMILY MEDICINE

## 2025-05-27 PROCEDURE — 37000009 HC ANESTHESIA EA ADD 15 MINS

## 2025-05-27 PROCEDURE — 88305 TISSUE EXAM BY PATHOLOGIST: CPT | Mod: TC,91 | Performed by: FAMILY MEDICINE

## 2025-05-27 PROCEDURE — 27201012 HC FORCEPS, HOT/COLD, DISP: Performed by: FAMILY MEDICINE

## 2025-05-27 PROCEDURE — 43239 EGD BIOPSY SINGLE/MULTIPLE: CPT | Performed by: FAMILY MEDICINE

## 2025-05-27 PROCEDURE — 45380 COLONOSCOPY AND BIOPSY: CPT | Performed by: FAMILY MEDICINE

## 2025-05-27 PROCEDURE — 63600175 PHARM REV CODE 636 W HCPCS: Performed by: NURSE ANESTHETIST, CERTIFIED REGISTERED

## 2025-05-27 RX ORDER — LIDOCAINE HYDROCHLORIDE 10 MG/ML
INJECTION, SOLUTION EPIDURAL; INFILTRATION; INTRACAUDAL; PERINEURAL
Status: DISCONTINUED | OUTPATIENT
Start: 2025-05-27 | End: 2025-05-27

## 2025-05-27 RX ORDER — PROPOFOL 10 MG/ML
VIAL (ML) INTRAVENOUS
Status: DISCONTINUED | OUTPATIENT
Start: 2025-05-27 | End: 2025-05-27

## 2025-05-27 RX ORDER — SODIUM CHLORIDE 9 MG/ML
INJECTION, SOLUTION INTRAVENOUS CONTINUOUS
Status: DISCONTINUED | OUTPATIENT
Start: 2025-05-27 | End: 2025-05-28 | Stop reason: HOSPADM

## 2025-05-27 RX ADMIN — PROPOFOL 50 MG: 10 INJECTION, EMULSION INTRAVENOUS at 10:05

## 2025-05-27 RX ADMIN — PROPOFOL 40 MG: 10 INJECTION, EMULSION INTRAVENOUS at 10:05

## 2025-05-27 RX ADMIN — PROPOFOL 30 MG: 10 INJECTION, EMULSION INTRAVENOUS at 10:05

## 2025-05-27 RX ADMIN — LIDOCAINE HYDROCHLORIDE 100 MG: 10 SOLUTION INTRAVENOUS at 10:05

## 2025-05-27 RX ADMIN — PROPOFOL 150 MG: 10 INJECTION, EMULSION INTRAVENOUS at 10:05

## 2025-05-27 NOTE — TRANSFER OF CARE
"Anesthesia Transfer of Care Note    Patient: Olga Roger    Procedure(s) Performed: * No procedures listed *    Patient location: PACU    Anesthesia Type: MAC    Transport from OR: Transported from OR on room air with adequate spontaneous ventilation    Post pain: adequate analgesia    Post assessment: no apparent anesthetic complications    Post vital signs: stable    Level of consciousness: awake and alert    Nausea/Vomiting: no nausea/vomiting    Complications: none    Transfer of care protocol was followed    Last vitals: Visit Vitals  BP (!) 170/92 (BP Location: Left arm, Patient Position: Lying)   Pulse 87   Temp 36.9 °C (98.5 °F)   Resp 20   Ht 5' 5" (1.651 m)   Wt 90.7 kg (200 lb)   SpO2 99%   Breastfeeding No   BMI 33.28 kg/m²     "

## 2025-05-27 NOTE — H&P
Short Stay Endoscopy History and Physical    PCP - Saskia Albert MD    Procedure - EGD and colonoscopy  ASA - 1  Mallampati - per anesthesia  History of Anesthesia problems - no  Family history Anesthesia problems -  no     HPI:  This is a 39 y.o. female here for evaluation of :   Active Hospital Problems    Diagnosis  POA    *Iron deficiency anemia [D50.9]  Yes      Resolved Hospital Problems   No resolved problems to display.         Health Maintenance         Date Due Completion Date    Lipid Panel 08/25/2024 8/25/2023    COVID-19 Vaccine (3 - 2024-25 season) 09/01/2024 12/13/2021    Pneumococcal Vaccines (Age 0-49) (1 of 2 - PCV) 06/19/2025 (Originally 12/8/2005) ---    Diabetic Eye Exam 08/28/2025 8/28/2024    Foot Exam 09/17/2025 9/17/2024 (Done)    Override on 9/17/2024: Done    Override on 8/25/2023: Done    Override on 6/23/2020: Done    Hemoglobin A1c 09/30/2025 3/31/2025    Diabetes Urine Screening 12/19/2025 12/19/2024    TETANUS VACCINE 02/16/2026 2/16/2016    Cervical Cancer Screening 05/02/2029 5/2/2024    RSV Vaccine (Age 60+ and Pregnant patients) (1 - 1-dose 75+ series) 12/08/2061 ---            EGD  Reflux - No  Dysphagia - no  Abdominal pain - no  Diarrhea - no  Anemia - yes  GI bleeding - no  Other - no    Colonoscopy  Screening - No  History of polyps - No    Diarrhea - no  Anemia - yes  Blood in stools - no  Abdominal pain - no  Other - no    ROS:  CONSTITUTIONAL: Denies weight change,  fatigue, fevers, chills, night sweats.  CARDIOVASCULAR: Denies chest pain, shortness of breath, orthopnea and edema.  RESPIRATORY: Denies cough, hemoptysis, dyspnea, and wheezing.  GI: See HPI.    Medical History:   Past Medical History:   Diagnosis Date    Diabetes mellitus type 2 in nonobese 10/31/2018    A1c >14% as of 11/1/19; Stopped metformin 2/2 diarrhea -> Lantus & atorvastatin on 11/1/19    Hypertriglyceridemia 11/01/2019    TG 1,261 mg/dL, for which we will consider fenofibrate if  atorvastatin doesn't causes significant transaminitis (will also consider Lovaza)    Overweight (BMI 25.0-29.9)        Surgical History:   Past Surgical History:   Procedure Laterality Date    INJECTION OF ANESTHETIC AGENT INTO SACROILIAC JOINT Right 12/3/2024    Procedure: Right SIJ Injection;  Surgeon: Fam Herrera MD;  Location: [facility];  Service: Pain Management;  Laterality: Right;    INJECTION, SACROILIAC JOINT Right 4/17/2025    Procedure: Right SIJ + Right GT bursa (therapeutic);  Surgeon: Fam Herrera MD;  Location: [facility];  Service: Pain Management;  Laterality: Right;       Family History:   Family History   Problem Relation Name Age of Onset    Diabetes Father Manuel Hustony     Blindness Father Manuel Kana     No Known Problems Mother      Breast cancer Maternal Grandmother      Breast cancer Maternal Aunt Diatrise Self 45    Cancer Maternal Aunt Diatrise Self         Breast    Depression Brother Corky Walters        Social History:   Social History[1]    Allergies:   Review of patient's allergies indicates:   Allergen Reactions    Strawberry Hives       Medications:   Medications Ordered Prior to Encounter[2]    Physical Exam:  Vital Signs:   Vitals:    05/27/25 0941   BP: (!) 170/92   Pulse: 87   Resp: 20   Temp: 98.5 °F (36.9 °C)     General Appearance: Well appearing in no acute distress  ENT: OP clear  Chest: CTA B  CV: RRR, no m/r/g  Abd: s/nt/nd/nabs  Ext: no edema    Labs:Reviewed    IMP:  Active Hospital Problems    Diagnosis  POA    *Iron deficiency anemia [D50.9]  Yes      Resolved Hospital Problems   No resolved problems to display.         Plan:   I have explained the risks and benefits of upper endoscopy and colonoscopy to the patient including but not limited to bleeding, perforation, infection, and death. The patient wishes to proceed.         [1]   Social History  Tobacco Use    Smoking status: Never    Smokeless tobacco: Never   Substance Use Topics    Alcohol  "use: Not Currently     Alcohol/week: 6.0 standard drinks of alcohol     Types: 4 Glasses of wine, 2 Shots of liquor per week    Drug use: Never   [2]   Current Outpatient Medications on File Prior to Encounter   Medication Sig Dispense Refill    amLODIPine (NORVASC) 5 MG tablet Take 5 mg by mouth.      BD SOY 2ND GEN PEN NEEDLE 32 gauge x 5/32" Ndle use as directed      blood sugar diagnostic Strp To check BG 3 times daily, to use with insurance preferred meter 100 each 0    DOMPERIDONE, BULK, MISC 10 mg by Misc.(Non-Drug; Combo Route) route 3 (three) times daily as needed.      insulin lispro (HUMALOG KWIKPEN INSULIN) 100 unit/mL pen Inject 2-8 Units into the skin every 4 (four) hours. USING CORRECTION SCALE. Check sugars before meals (breakfast, lunch, and dinner). Follow the sliding scale for meal-time/as needed insulin: BG Under 160=0 unit 160-200=2 units 201-250=4 units 251-300=6 units 301-350=8 units 351-400=10 units over 400=12 units 14.4 mL 11    lancets Misc To check BG 3 times daily, to use with insurance preferred meter 100 each 1    metoclopramide HCl (REGLAN) 10 MG tablet Take 1 tablet (10 mg total) by mouth 3 (three) times daily before meals. 90 tablet 3    metoprolol succinate (TOPROL-XL) 50 MG 24 hr tablet Take 1 tablet (50 mg total) by mouth once daily. 90 tablet 3    naltrexone-bupropion (CONTRAVE) 8-90 mg TbSR Take 1 tablet by mouth 2 (two) times daily. 60 tablet 11    omeprazole (PRILOSEC) 40 MG capsule Take 40 mg by mouth.      pregabalin (LYRICA) 50 MG capsule TAKE 1 CAPSULE BY MOUTH THREE TIMES DAILY 90 capsule 2    blood-glucose meter kit To check BG 3 times daily, to use with insurance preferred meter 100 each 1    blood-glucose sensor (DEXCOM G7 SENSOR MISC) 3      blood-glucose sensor (DEXCOM G7 SENSOR) Hui 1 each by Misc.(Non-Drug; Combo Route) route every 10 days. 9 each 3    DEXCOM G7  Misc USE  ONCE DAILY 1 each 0    insulin glargine U-100, Lantus, (LANTUS SOLOSTAR U-100 " INSULIN) 100 unit/mL (3 mL) InPn pen Inject 30 Units into the skin every evening. 27 mL 5     Current Facility-Administered Medications on File Prior to Encounter   Medication Dose Route Frequency Provider Last Rate Last Admin    levonorgestreL (MIRENA) 20 mcg/24 hours (7 yrs) 52 mg IUD 1 Intra Uterine Device  1 Intra Uterine Device Intrauterine  Tino Garcia, NP   1 Intra Uterine Device at 11/23/21 1400

## 2025-05-27 NOTE — ANESTHESIA PREPROCEDURE EVALUATION
05/27/2025  Olga Paiz Renetta Roger is a 39 y.o., female.      Pre-op Assessment    I have reviewed the Patient Summary Reports.     I have reviewed the Nursing Notes. I have reviewed the NPO Status.   I have reviewed the Medications.     Review of Systems  Anesthesia Hx:  No problems with previous Anesthesia                Social:  Non-Smoker, No Alcohol Use       Hematology/Oncology:    Oncology Normal    -- Anemia:                                  EENT/Dental:  EENT/Dental Normal           Cardiovascular:     Hypertension, well controlled                                          Pulmonary:  Pulmonary Normal                       Renal/:  Renal/ Normal                 Hepatic/GI:  Bowel Prep.                   Musculoskeletal:  Musculoskeletal Normal                Neurological:  Neurology Normal                                      Endocrine:  Diabetes, well controlled, type 2         Obesity / BMI > 30  Dermatological:  Skin Normal    Psych:  Psychiatric History anxiety depression            Physical Exam  General: Well nourished    Airway:  Mallampati: II   Mouth Opening: Normal  TM Distance: Normal  Tongue: Normal  Neck ROM: Normal ROM    Dental:  Intact    Chest/Lungs:  Clear to auscultation    Heart:  Rate: Normal    Anesthesia Plan  Type of Anesthesia, risks & benefits discussed:    Anesthesia Type: MAC  Intra-op Monitoring Plan: Standard ASA Monitors  Induction:  IV  Informed Consent: Informed consent signed with the Patient and all parties understand the risks and agree with anesthesia plan.  All questions answered. Patient consented to blood products? Yes  ASA Score: 2    Ready For Surgery From Anesthesia Perspective.   .

## 2025-05-27 NOTE — PLAN OF CARE
Dr. Agudelo at  to discuss findings. VSS. No  Pain, no GI bleeding. Pt to be discharged from unit.

## 2025-05-27 NOTE — LETTER
Olga Roger  [address]  [place] [address]    PEG (polyethylene glycol) Instructions for Colonoscopy Common Brands: Golytely, Colyte, Nulytely, Gavilyte, Trilyte    Date of procedure: 05/27/2025   Your arrival time will be given to you prior to the day of your procedure.  Your procedure will be performed at [facility] ([facility]). The hospital is located at [address]        As soon as possible:     your prep from pharmacy and over the counter DULCOLAX LAXATIVE TABLETS, GAS-X.    Three (3) days before colonoscopy: Avoid high fiber foods such as whole wheat or whole grain breads or pasta, raw vegetables or fruit with peels, corn, beans, peas, lentils, nuts, seeds, and popcorn.    On the day before your procedure     What You CAN do:     You may have CLEAR LIQUIDS ONLY (Drink at least 16 glasses (8oz glass)-   see below for list.   Liquids That Are OK to Drink:    Water    Sports drinks (Gatorade, Power-Aid)    Coffee or tea (no cream or nondairy creamer)    Clear juices without pulp (apple, white grape)    Gelatin desserts (no fruit or toppings)    Clear soda (sprite, coke, ginger ale)    Chicken broth (until 12 midnight the night before procedure)      What You CANNOT do:      Do not EAT solid food, drink milk or anything colored red or purple.    Do not drink alcohol.    Do not take oral medications within 1 hour of starting each dose of prep.    No tobacco products, gum chewing, or candy morning of procedure     PLEASE DISREGARD THE INSERT INSTRUCTIONS FROM THE PHARMACY.  How to take prep: Mix bowel prep by adding water to fill line and put in refrigerator. Drink at least 12 glasses (8oz glass) of clear liquids during the day. Mix the prep with Crystal Light (no red or purple flavoring), apple juice, or Gatorade (no red or purple) to improve the flavor.    DOSE 1-Day Before Colonoscopy  6:00 pm Take four (4) Dulcolax  (Bisacodyl) Laxative tablets and 1 simethicone (GAS-X) 125 mg capsule with at least 8 ounces or more of clear liquids.    7:00 pm Drink half the liquid in the container (1/2 gallon) within (1.5) hours. Drink an 8 oz glass every 15 minutes.      After midnight, nothing to drink or eat except for the bowel prep.     DOSE 2-Day of the Colonoscopy     [place] the time you were instructed: 2:00 AM       or     5:00AM    For this dose, Drink the remaining half of the liquid prep (1/2 gallon). Please have this consumed within 1 hour and 30 minutes.     After finishing prep, do not drink or eat anything until after the colonoscopy.   You may have a small sip of water with your morning medications. If your stool is brown, orange, or cloudy, your colon is not clean, please call endoscopy staff at [phone].    Endoscopy Scheduling Department at [phone].  Endoscopy Department at [phone].   On-Call Nurse line at [phone].  Financial Services at [phone].    Frequently Asked Questions- Colonoscopy  What are some tips for preparing for a colonoscopy?  Stay near a toilet after taking the prep, you will have diarrhea and may have cramping with your bowel movements.  For skin irritation or flaring of hemorrhoids from frequent bowel movements and wiping, ease with over-the-counter products like baby wipes, Vaseline, or Tucks pads.  If experiencing nausea from the prep, take a 30-minute break, rinse your mouth, and continue drinking the prep. Dramamine (Meclizine) is available over the counter, take ½ of a tablet (12.5 mg) every 6 hours as needed for nausea. Do not take more than 50 mg in 24 hours.   If a long drive or need a change to the prep direction times, please call the endoscopy department to talk with our staff at [phone].  Females between the ages of 10-55 may be asked for a urine sample (pregnancy test) after you check in for your procedure. Please let staff know before going to the  restroom.  Coumadin (WARFARIN), Plavix (CLOPIDOGREL), and Effient (PRASUGREL) MUST be stopped 5 days prior to exam unless discussed with the doctor performing the test. Eliquis (APIXABAN), Savaysa (EDOXABAN), Arixtra (FONDAPARINUX), and Xarelto (RIVAROXABAN) MUST be stopped 2 days prior to exam unless discussed with the doctor performing the test.  Glucagon-like peptide-1 receptor agonists (GLP-1 Lj) medications (semaglutide -OZEMPIC, RYBELSUS, WEGOVY; Dulaglutide- TRULICITY; Liraglutide- SAXENDA; tirzepatide- MOUNJARO) for weight loss or diabetes, MUST be stopped 7 days prior to exam unless discussed with the doctor performing the test.  Weight loss medications such as Phentermine (Adipex/Lomaira) and Diethylpropion (Amfepraone/Tepanil/Tenuate) should be stopped 7 days prior to exam.  You must have a licensed  to bring you home. If using public transportation, you must have an adult come with you.  Do not take any diabetic medications (including insulin) the morning of the exam. If your blood sugar goes below 70, you may drink 2 ounces of clear juice. Wait 15 minutes, then recheck your blood sugar. If it isn't going up, you may drink another 2 ounces of clear juice and contact the On-Call Nurse line at [phone].   Continue to take blood pressure, heart, thyroid, lung, seizure, and psychological medications the morning of procedure.    Do I have to drink all the bowel prep and water?         Yes, in addition to drinking plenty of clear liquids. Drinking plenty of clear liquids helps the prep to work and keep you hydrated. Any clear liquid that isn't red or purple. Drink at least 12 (8 oz) glasses of clear liquids or three 32 oz Gatorades by 5PM the day before your procedure. Drink   at least 1 (8 oz) glass of liquid every hour while you're awake. After the first dose of prep, drink an additional four (8 oz) glasses of clear liquids before midnight.  Clear liquids include: Coffee (no  "cream/milk)  Water  Tea  Clear carbonated drinks (ginger ale, Sprite, or sparkling water)  Gelatin/Jello  Apple, White grape, White Cranberry Juice  Beef/chicken broth/bouillon  Gatorade/Powerade  Lemonade/limeade  Snowballs/slushes  Popsicles  No "Energy" beverages or alcohol. No juices with pulp.  Do not drink red or purple liquids because the dye will stain the walls of your colon and may appear to be a bleed/abnormality.        The first dose of the prep starts to clean out the stool from your colon. The second dose of the prep helps to fully clean out the colon before the procedure.    What are some ways to improve the taste of the prep?  Put the prep solution in the refrigerator to chill before beginning the prep, tastes best cold.  Drinking the prep with a straw.    How will I know that the bowel prep is working? Why is it important to have a good prep or a clean colon before the procedure?  bowel movements are clear or clear yellow.   Colon should be clean as possible so the doctor can see the walls of the colon and find tiny polyps or colon cancer.  If there is stool in the colon, the doctor cannot move the endoscopy scope through the entire colon and the procedure will be canceled.  If a history of poor bowel prep, constipation, opiate medication use, diabetes, or kidney disease, please let us know; you may require an extended bowel prep to clean your colon.  If brown (including dark orange and cloudy) bowel movements on the morning of your procedure, please call the endoscopy department at [phone] (M-F from 6AM-3PM).      What should I bring to my appointment?  -List of your current medications                                              -Clothing that is easy to put back on after the procedure        -Method of payment        -Your ID         - Insurance card     Leave jewelry and other valuables at home.   Please plan to be at the hospital for 3 - 4 hours.    Why doesn't my appointment time show " up in [facility] or [facility]?  [facility] and [facility] are not set up to show surgical times. You will be called the day before the procedure and told your arrival time.    Where is the Endoscopy department located?  [facility] ([facility]) hospital is located at [address], off [address] ([place]). Once on [address], [facility] is the second building (Entrance [address]2) on the left. Check in for your procedure on the 1st floor at Registration.

## 2025-05-27 NOTE — ANESTHESIA POSTPROCEDURE EVALUATION
Anesthesia Post Evaluation    Patient: Olga Roger    Procedure(s) Performed: * No procedures listed *    Final Anesthesia Type: MAC      Patient location during evaluation: PACU  Patient participation: Yes- Able to Participate  Level of consciousness: awake and alert and awake  Post-procedure vital signs: reviewed and stable  Pain management: adequate  Airway patency: patent  JOSSELYN mitigation strategies: Multimodal analgesia  PONV status at discharge: No PONV  Anesthetic complications: no      Cardiovascular status: blood pressure returned to baseline  Respiratory status: spontaneous ventilation and room air  Hydration status: euvolemic  Follow-up not needed.            Vitals Value Taken Time   /66 05/27/25 11:05   Temp 98 05/27/25 11:05   Pulse 66 05/27/25 11:05   Resp 12 05/27/25 11:05   SpO2 98 05/27/25 11:05         No case tracking events are documented in the log.      Pain/Megha Score: No data recorded

## 2025-05-28 ENCOUNTER — TELEPHONE (OUTPATIENT)
Dept: FAMILY MEDICINE | Facility: CLINIC | Age: 40
End: 2025-05-28
Payer: COMMERCIAL

## 2025-05-28 ENCOUNTER — RESULTS FOLLOW-UP (OUTPATIENT)
Dept: FAMILY MEDICINE | Facility: CLINIC | Age: 40
End: 2025-05-28

## 2025-05-28 VITALS
RESPIRATION RATE: 18 BRPM | BODY MASS INDEX: 33.32 KG/M2 | OXYGEN SATURATION: 98 % | WEIGHT: 200 LBS | HEIGHT: 65 IN | DIASTOLIC BLOOD PRESSURE: 100 MMHG | TEMPERATURE: 98 F | HEART RATE: 81 BPM | SYSTOLIC BLOOD PRESSURE: 152 MMHG

## 2025-05-28 VITALS — DIASTOLIC BLOOD PRESSURE: 80 MMHG | SYSTOLIC BLOOD PRESSURE: 135 MMHG

## 2025-05-28 NOTE — PROGRESS NOTES
I have sent a msg to patient with the following interpretation (see below):  US thyroid shows small cysts and lymph node enlargement. We will monitor these for growth; hopefully, they begin to shrink. Will discuss repeating US in 6-12 months or sooner if needed.    Last time you were in clinic, your blood pressure was HIGH. Please check at home and send updated readings.    -Dr. Albert

## 2025-05-28 NOTE — TELEPHONE ENCOUNTER
----- Message from Saskia Albert MD sent at 5/28/2025 11:08 AM CDT -----      I have sent a msg to patient with the following interpretation (see below):  US thyroid shows small cysts and lymph node enlargement. We will monitor these for growth; hopefully, they begin to shrink. Will discuss repeating US in 6-12 months or sooner if needed.    Last time you were in clinic, your blood pressure was HIGH. Please check at home and send updated readings.    -Dr. Albert    ----- Message -----  From: Interface, Rad Results In  Sent: 5/26/2025   9:12 AM CDT  To: Saskia Albert MD

## 2025-05-28 NOTE — TELEPHONE ENCOUNTER
Patient informed and verbalized understanding.  Pt reports most recent home blood pressure reading was 135/80. Remote blood pressure entered

## 2025-05-29 ENCOUNTER — RESULTS FOLLOW-UP (OUTPATIENT)
Dept: FAMILY MEDICINE | Facility: CLINIC | Age: 40
End: 2025-05-29

## 2025-05-29 LAB
ESTROGEN SERPL-MCNC: NORMAL PG/ML
INSULIN SERPL-ACNC: NORMAL U[IU]/ML
LAB AP CLINICAL INFORMATION: NORMAL
LAB AP DIAGNOSIS CATEGORY: NORMAL
LAB AP GROSS DESCRIPTION: NORMAL
LAB AP PERFORMING LOCATION(S): NORMAL
LAB AP REPORT FOOTNOTES: NORMAL

## 2025-06-10 DIAGNOSIS — E11.9 DIABETES MELLITUS TYPE 2 IN NONOBESE: ICD-10-CM

## 2025-06-11 RX ORDER — BLOOD-GLUCOSE SENSOR
1 EACH MISCELLANEOUS
Qty: 9 EACH | Refills: 3 | Status: SHIPPED | OUTPATIENT
Start: 2025-06-11

## 2025-06-11 NOTE — TELEPHONE ENCOUNTER
No care due was identified.  [facility] Embedded Care Due Messages. Reference number: [id number].   6/10/2025 9:32:51 PM CDT

## 2025-06-11 NOTE — TELEPHONE ENCOUNTER
Refill Decision Note   Olga Hustony  is requesting a refill authorization.  Brief Assessment and Rationale for Refill:  Approve     Medication Therapy Plan:         Comments:     Note composed:8:30 AM 06/11/2025

## 2025-06-13 ENCOUNTER — TELEPHONE (OUTPATIENT)
Dept: PAIN MEDICINE | Facility: CLINIC | Age: 40
End: 2025-06-13
Payer: COMMERCIAL

## 2025-06-13 NOTE — TELEPHONE ENCOUNTER
Reached out to patient to schedule appointment from messages. Apt has been made.   Pt understand. All questions answered.     Gaurav Sheth Medical Assistant

## 2025-06-20 ENCOUNTER — OFFICE VISIT (OUTPATIENT)
Dept: PAIN MEDICINE | Facility: CLINIC | Age: 40
End: 2025-06-20
Payer: COMMERCIAL

## 2025-06-20 VITALS — BODY MASS INDEX: 33.28 KG/M2 | HEIGHT: 65 IN

## 2025-06-20 DIAGNOSIS — M76.01 GLUTEAL TENDONITIS OF RIGHT BUTTOCK: ICD-10-CM

## 2025-06-20 DIAGNOSIS — M46.1 SACROILIITIS: Primary | ICD-10-CM

## 2025-06-20 DIAGNOSIS — M70.61 TROCHANTERIC BURSITIS OF RIGHT HIP: ICD-10-CM

## 2025-06-20 PROCEDURE — 98005 SYNCH AUDIO-VIDEO EST LOW 20: CPT | Mod: 95,,, | Performed by: PHYSICAL MEDICINE & REHABILITATION

## 2025-06-20 PROCEDURE — 3044F HG A1C LEVEL LT 7.0%: CPT | Mod: CPTII,95,, | Performed by: PHYSICAL MEDICINE & REHABILITATION

## 2025-06-20 NOTE — PROGRESS NOTES
Established Patient - TeleHealth Visit    The patient location is: [place]  The chief complaint leading to consultation is: chronic pain     Visit type: Audiovisual telemedicine visit     Total time spent on the encounter includes Face-to-face time and non-face to face time preparing to see the patient (eg, review of tests), Obtaining and/or reviewing separately obtained history, Documenting clinical information in the electronic or other health record, Independently interpreting results (not separately reported) and communicating results to the patient/family/caregiver, and/or Care coordination (not separately reported).     Each patient to whom he or she provides medical services by telemedicine is:  (1) informed of the relationship between the physician and patient and the respective role of any other health care provider with respect to management of the patient; and (2) notified that he or she may decline to receive medical services by telemedicine and may withdraw from such care at any time.      Chief Complaint:   Chief Complaint   Patient presents with    Follow-up        SUBJECTIVE:    Interval HPI 06/20/2025:  Olga Paiz Renetta Roger is a 39 y.o. female presents today for injection follow up after successfully undergoing right-sided SI joint and GTB injections on 04/17/2025.  She reports 100% relief and states that her pain has completely resolved since these injections.  She reports that she is able to walk daily and continues to participate in home exercises as much as she can perform in his not having to use any type of medications for pain at this time.  Current pain intensity is 0/10.      Patient denies night fever/night sweats, urinary incontinence, bowel incontinence, significant weight loss, significant motor weakness, and loss of sensations.    Pain Disability Index Review:      3/21/2025     9:58 AM 1/10/2025     9:56 AM 11/15/2024     8:43 AM   Last 3 PDI Scores   Pain Disability Index  (PDI) 0 36 59         Interval HPI 03/21/2025:  Olga Roger is a 39 y.o. female presents today for follow-up for right lower back and hip pain.  She had tremendous relief with previous right-sided SI joint injection that has performed 3+ months ago in his now starting to wear off.  She also notes that she is starting to have more lateral hip pain as well.  Current pain intensity is 0/10, but when she starts to get up and move her pain can increase to 8/10.  Most notable when she gets from a seated position to a standing position.    Interval HPI 01/10/2025:  Olga Roger is a 39 y.o. female presents today for follow-up injection after successfully undergoing right-sided SI joint injection on 12/03/2024.  She reports that this resulted in 90% relief overall.  However, patient still reports that she has occasional sudden jolts of pain in her right buttock area without any noticeable triggers.    Initial HPI 11/15/2024:  Olga Roger is a 39 y.o. female who presents to the clinic for the evaluation of right sided lower back and leg pain.  She was referred by her primary care team for further evaluation and management of this pain.  She has past medical history of depression, hyperlipidemia, hypertension, DM2, gastroparesis, and multiple other medical comorbidities as listed in chart.The pain started a couple of months ago and symptoms have been worsening.The pain is located in the right lumbosacral area and radiates to the right lateral hip and posterior thigh and occasionally into the right groin.  The pain is described as sharp, stabbing and is rated as 7/10. The pain is rated with a score of  7/10 on the BEST day and a score of 10/10 on the WORST day.  Symptoms interfere with daily activity. The pain is exacerbated by nothing in particular.  The pain is mitigated by nothing.     Non-Pharmacologic Treatments:  Physical Therapy/Home Exercise: yes, VERY LIMITED BENEFIT DUE TO SEVERE  PAIN  Ice/Heat:yes  TENS: no  Acupuncture: no  Massage: no  Chiropractic: no    Other: no      Pain Medications:  - Opioids: Tramadol, Percocet, Norco  - Adjuvant Medications: gabapentin, Lyrica, compound cream, Robaxin  - Anti-Coagulants:  none     report:  Reviewed and consistent with medication use as prescribed.    Pain Procedures:   Tenjet on the right hip   Right hip joint injection under ultrasound  Right SI joint injection on 12/03/2024, 90% relief  Right sacroiliac joint and GTB injection on 04/17/2025, 100% relief      Imaging:    X-ray right hip 10/22/2024:   No change. There is no fracture or dislocation. Hip joint space is normal. No congenital sequela. Right iliac wing bone island. IUD.     X-ray lumbar spine 10/22/2024:   There is no fracture or malalignment. The disc spaces are well-preserved. No degenerative sequela. Scattered colonic air-fluid levels. No bowel obstruction. No stool retention. IUD. Right iliac wing bone island.     MRI right hip 09/05/2024:  BONES: 1 cm benign incidental sclerotic bone island right iliac wing.  No fracture, marrow edema or suspicious bone lesion. No evidence of avascular necrosis.     RIGHT HIP JOINT: Anatomic alignment. Physiologic amount of joint fluid. Chondral thinning and small partial chondral erosions in the superior joint space with a small full-thickness chondral defect and mild subchondral cystic change of the anterior   acetabular roof.     LABRUM: Incidental anterior inferior sublabral sulcus.  Possible small low-grade partial-thickness linear undersurface tear of the anterior labrum (image 17 series 10).  Possible low-grade partial undersurface tear of the superior labrum (coronal images   14-17 series 6).     FEMOROACETABULAR IMPINGEMENT ANATOMY: Femoral head-neck morphology is within normal limits. Head-neck alpha angle is normal measuring 53 degrees. No pincer lesions identified.     MUSCLES, TENDONS AND REMAINING MAJOR SUPPORTING SOFT TISSUE  "STRUCTURES: Mild abductor tendinosis.  Remaining muscles and tendons are within normal limits.         PMHx,PSHx, Social history, and Family history:  I have reviewed the patient's medical, surgical, social, and family history in detail and updated the computerized patient record.        Review of patient's allergies indicates:   Allergen Reactions    [place] Hives       Current Outpatient Medications   Medication Sig    amLODIPine (NORVASC) 5 MG tablet Take 5 mg by mouth.    BD SOY 2ND GEN PEN NEEDLE 32 gauge x 5/32" Ndle use as directed    blood sugar diagnostic Strp To check BG 3 times daily, to use with insurance preferred meter    blood-glucose meter kit To check BG 3 times daily, to use with insurance preferred meter    blood-glucose sensor (DEXCOM G7 SENSOR MISC) 3    blood-glucose sensor (DEXCOM G7 SENSOR) Hui 1 each by Misc.(Non-Drug; Combo Route) route every 10 days.    DEXCOM G7  Misc USE  ONCE DAILY    DOMPERIDONE, BULK, MISC 10 mg by Misc.(Non-Drug; Combo Route) route 3 (three) times daily as needed.    insulin glargine U-100, Lantus, (LANTUS SOLOSTAR U-100 INSULIN) 100 unit/mL (3 mL) InPn pen Inject 30 Units into the skin every evening.    insulin lispro (HUMALOG KWIKPEN INSULIN) 100 unit/mL pen Inject 2-8 Units into the skin every 4 (four) hours. USING CORRECTION SCALE. Check sugars before meals (breakfast, lunch, and dinner). Follow the sliding scale for meal-time/as needed insulin: BG Under 160=0 unit 160-200=2 units 201-250=4 units 251-300=6 units 301-350=8 units 351-400=10 units over 400=12 units    lancets Misc To check BG 3 times daily, to use with insurance preferred meter    metoclopramide HCl (REGLAN) 10 MG tablet Take 1 tablet (10 mg total) by mouth 3 (three) times daily before meals.    metoprolol succinate (TOPROL-XL) 50 MG 24 hr tablet Take 1 tablet (50 mg total) by mouth once daily.    naltrexone-bupropion (CONTRAVE) 8-90 mg TbSR Take 1 tablet by mouth 2 (two) times daily. "    omeprazole (PRILOSEC) 40 MG capsule Take 40 mg by mouth.    pregabalin (LYRICA) 50 MG capsule TAKE 1 CAPSULE BY MOUTH THREE TIMES DAILY     Current Facility-Administered Medications   Medication    levonorgestreL (MIRENA) 20 mcg/24 hours (7 yrs) 52 mg IUD 1 Intra Uterine Device       Review of Systems     GENERAL:  No weight loss, malaise or fevers.  HEENT:   No recent changes in vision or hearing  NECK:  Negative for lumps, no difficulty with swallowing.  RESPIRATORY:  Negative for cough, wheezing or shortness of breath, patient denies any recent URI.  CARDIOVASCULAR:  Negative for chest pain, leg swelling or palpitations.  GI:  Negative for abdominal discomfort, blood in stools or black stools or change in bowel habits.  MUSCULOSKELETAL:  See HPI.  SKIN:  Negative for lesions, rash, and itching.  PSYCH:  No mood disorder or recent psychosocial stressors.  Patients sleep is not disturbed secondary to pain.  HEMATOLOGY/LYMPHOLOGY:  Negative for prolonged bleeding, bruising easily or swollen nodes.  Patient is not currently taking any anti-coagulants  NEURO:   No history of headaches, syncope, paralysis, seizures or tremors.  All other reviewed and negative other than HPI.    OBJECTIVE:      Physical exam:  GENERAL: Well appearing, in no acute distress, alert and oriented x3.    PSYCH:  Mood and affect appropriate.  SKIN: Skin color, texture, turgor normal, no rashes or lesions.  HEAD/FACE:  Normocephalic, atraumatic. Cranial nerves grossly intact.  CV:  No peripheral edema noted  PULM:  No difficulty breathing             LABS:  Lab Results   Component Value Date    WBC 6.91 03/31/2025    HGB 9.8 (L) 03/31/2025    HCT 32.3 (L) 03/31/2025    MCV 78 (L) 03/31/2025     03/31/2025       CMP  Sodium   Date Value Ref Range Status   03/31/2025 138 136 - 145 mmol/L Final   12/19/2024 140 136 - 145 mmol/L Final     Potassium   Date Value Ref Range Status   03/31/2025 4.4 3.5 - 5.1 mmol/L Final   12/19/2024 4.4 3.5  - 5.1 mmol/L Final     Chloride   Date Value Ref Range Status   03/31/2025 107 95 - 110 mmol/L Final   12/19/2024 109 95 - 110 mmol/L Final     CO2   Date Value Ref Range Status   03/31/2025 21 (L) 23 - 29 mmol/L Final   12/19/2024 22 (L) 23 - 29 mmol/L Final     Glucose   Date Value Ref Range Status   03/31/2025 212 (H) 70 - 110 mg/dL Final   12/19/2024 146 (H) 70 - 110 mg/dL Final     BUN   Date Value Ref Range Status   03/31/2025 22 (H) 6 - 20 mg/dL Final     Creatinine   Date Value Ref Range Status   03/31/2025 1.5 (H) 0.5 - 1.4 mg/dL Final     Calcium   Date Value Ref Range Status   03/31/2025 9.6 8.7 - 10.5 mg/dL Final   12/19/2024 9.1 8.7 - 10.5 mg/dL Final     Total Protein   Date Value Ref Range Status   06/19/2024 7.6 6.0 - 8.4 g/dL Final     Albumin   Date Value Ref Range Status   08/31/2024 4.4 3.4 - 5.0 g/dL Final   06/19/2024 3.7 3.5 - 5.2 g/dL Final     Total Bilirubin   Date Value Ref Range Status   08/31/2024 0.4 <1.3 mg/dL Final   06/19/2024 0.2 0.1 - 1.0 mg/dL Final     Comment:     For infants and newborns, interpretation of results should be based  on gestational age, weight and in agreement with clinical  observations.    Premature Infant recommended reference ranges:  Up to 24 hours.............<8.0 mg/dL  Up to 48 hours............<12.0 mg/dL  3-5 days..................<15.0 mg/dL  6-29 days.................<15.0 mg/dL       Alkaline Phosphatase   Date Value Ref Range Status   06/19/2024 91 55 - 135 U/L Final     AST   Date Value Ref Range Status   08/31/2024 13 <45 U/L Final   06/19/2024 13 10 - 40 U/L Final     ALT   Date Value Ref Range Status   08/31/2024 14 <46 U/L Final   06/19/2024 6 (L) 10 - 44 U/L Final     Anion Gap   Date Value Ref Range Status   03/31/2025 10 8 - 16 mmol/L Final     eGFR if    Date Value Ref Range Status   09/23/2021 >60.0 >60 mL/min/1.73 m^2 Final     eGFR if non    Date Value Ref Range Status   09/23/2021 >60.0 >60 mL/min/1.73 m^2  Final     Comment:     Calculation used to obtain the estimated glomerular filtration  rate (eGFR) is the CKD-EPI equation.          Lab Results   Component Value Date    HGBA1C 6.9 (H) 03/31/2025             ASSESSMENT: 39 y.o. year old female with lower back and right hip pain, consistent with     1. Sacroiliitis        2. Trochanteric bursitis of right hip        3. Gluteal tendonitis of right buttock                    PLAN:   - Interventions:    S/p repeat right-sided SI joint along with a right-sided GTB injection on 04/17/2025, 100% relief    S/p right-sided SI joint injection on 12/03/2024, 90% relief for 3+ months    - Anticoagulation use:  None      - Medications: I have stressed the importance of physical activity and a home exercise plan to help with pain and improve health. and Patient can continue with medications for now since they are providing benefits, using them appropriately, and without side effects.         - Therapy:  Continue with home exercises that she learned from physical therapy to maintain pain relief    - Psychological:  Discussed coping mechanisms to help address chronic pain issues    - Labs:  Reviewed    - Imaging: Reviewed available imaging with patient and answered any questions they had regarding study.    - Consults/Referrals:  None at this time    - Records:  Reviewed/Obtain old records from outside physicians and imaging    - Follow up visit: return to clinic as needed    - Counseled patient regarding the importance of activity modification and physical therapy    - This condition does not require this patient to take time off of work, and the primary goal of our Pain Management services is to improve the patient's functional capacity.    - Patient Questions: Answered all of the patient's questions regarding diagnosis, therapy, and treatment        The above plan and management options were discussed at length with patient. Patient is in agreement with the above and verbalized  understanding.      Fam Herrera MD  Interventional Pain Management  [facility]    Disclaimer:  This note was prepared using voice recognition system and is likely to have sound alike errors that may have been overlooked even after proof reading.  Please call me with any questions

## 2025-07-03 RX ORDER — OMEPRAZOLE 40 MG/1
40 CAPSULE, DELAYED RELEASE ORAL EVERY MORNING
Qty: 90 CAPSULE | Refills: 0 | Status: SHIPPED | OUTPATIENT
Start: 2025-07-03

## 2025-07-03 NOTE — TELEPHONE ENCOUNTER
Refill Routing Note   Medication(s) are not appropriate for processing by [facility] for the following reason(s):        No active prescription written by provider    [facility] action(s):  Defer     Requires labs : Yes             Appointments  past 12m or future 3m with PCP    Date Provider   Last Visit   5/8/2025 Saskia Albert MD   Next Visit   8/4/2025 Saskia Albert MD   ED visits in past 90 days: 0        Note composed:2:42 PM 07/03/2025

## 2025-07-03 NOTE — TELEPHONE ENCOUNTER
Copied from CRM #[id number]. Topic: Medications - Medication Refill  >> Jul 3, 2025  8:29 AM Summer wrote:  Type:  Patient Call Back Request     Who Called: Leroyal   Message for Patient: Nurse   What this is regarding?: acid reflux medicine  Would the patient rather a call back or a response via [facility]? Callback  Best Call Back Number: There are no phone numbers on file.  Additional Information: Pt was on acid reflux medicine and wants to be back in them and needs a refill. Please contact pt

## 2025-07-03 NOTE — TELEPHONE ENCOUNTER
Care Due:                  Date            Visit Type   Department     Provider  --------------------------------------------------------------------------------                                EP -                              PRIMARY      [facility]  Last Visit: 05-      CARE ([facility])   [facility] -                              PRIMARY      [facility]  Next Visit: 08-      CARE ([facility])   [facility]  Test          Frequency    Reason                     Performed    Due Date  --------------------------------------------------------------------------------    HBA1C.......  6 months...  insulin..................  03- 09-    [facility] Embedded Care Due Messages. Reference number: [id number].   7/03/2025 8:39:32 AM CDT

## 2025-07-11 ENCOUNTER — PATIENT MESSAGE (OUTPATIENT)
Dept: PRIMARY CARE CLINIC | Facility: CLINIC | Age: 40
End: 2025-07-11
Payer: COMMERCIAL

## 2025-07-11 ENCOUNTER — TELEPHONE (OUTPATIENT)
Dept: FAMILY MEDICINE | Facility: CLINIC | Age: 40
End: 2025-07-11
Payer: COMMERCIAL

## 2025-07-11 DIAGNOSIS — D50.9 MICROCYTIC ANEMIA: Primary | ICD-10-CM

## 2025-07-11 NOTE — TELEPHONE ENCOUNTER
Patient stated that she spoke with Dr. Baker.    She was told that her iron levels are still low although she is taking OTC iron supplements.    She would like to know if there is any alternatives that she could try. She mentioned a prescribed iron supplement.    Please advise.

## 2025-07-11 NOTE — TELEPHONE ENCOUNTER
Iron levels are normal; however, you are still anemic. I have placed a referral to hematology to further evaluate. My team will get this scheduled.   Lab Results   Component Value Date    IRON 79 03/31/2025    TRANSFERRIN 225 03/31/2025    TIBC 333 03/31/2025    LABIRON 24 03/31/2025    FESATURATED 21 12/19/2024      Lab Results   Component Value Date    WBC 6.91 03/31/2025    HGB 9.8 (L) 03/31/2025    HCT 32.3 (L) 03/31/2025    MCV 78 (L) 03/31/2025     03/31/2025           Orders Placed This Encounter   Procedures    Ambulatory referral/consult to Hematology / Oncology     Standing Status:   Future     Expected Date:   7/18/2025     Expiration Date:   8/11/2026     Referral Priority:   Routine     Referral Type:   Consultation     Referral Reason:   Specialty Services Required     Referred to Provider:   Alicia Barclay MD     Requested Specialty:   Hematology and Oncology

## 2025-07-11 NOTE — TELEPHONE ENCOUNTER
Copied from CRM #[id number]. Topic: General Inquiry - Patient Advice  >> Jul 11, 2025 11:36 AM Leta wrote:  Patient called to consult with nurse or staff regarding her recent visit to her Nephrologist. She states that she discussed anemia with her doctor and would like to request an iron prescription if possible. She would like a call back and can be reached at [phone]. Thanks/MR

## 2025-07-14 ENCOUNTER — TELEPHONE (OUTPATIENT)
Dept: FAMILY MEDICINE | Facility: CLINIC | Age: 40
End: 2025-07-14
Payer: COMMERCIAL

## 2025-07-17 ENCOUNTER — TELEPHONE (OUTPATIENT)
Dept: HEMATOLOGY/ONCOLOGY | Facility: CLINIC | Age: 40
End: 2025-07-17
Payer: COMMERCIAL

## 2025-07-17 DIAGNOSIS — D50.9 IRON DEFICIENCY ANEMIA: Primary | ICD-10-CM

## 2025-07-17 NOTE — TELEPHONE ENCOUNTER
Spoke to patient in reference to Hematology referral to Dr. Bernard from Dr. Albert. Appointment scheduled per patient's request next available at the [place]. Appointment notice via pt portal.

## 2025-07-23 ENCOUNTER — OFFICE VISIT (OUTPATIENT)
Dept: FAMILY MEDICINE | Facility: CLINIC | Age: 40
End: 2025-07-23
Payer: COMMERCIAL

## 2025-07-23 ENCOUNTER — LAB VISIT (OUTPATIENT)
Dept: LAB | Facility: HOSPITAL | Age: 40
End: 2025-07-23
Attending: STUDENT IN AN ORGANIZED HEALTH CARE EDUCATION/TRAINING PROGRAM
Payer: COMMERCIAL

## 2025-07-23 VITALS
SYSTOLIC BLOOD PRESSURE: 138 MMHG | WEIGHT: 213.88 LBS | HEART RATE: 86 BPM | OXYGEN SATURATION: 100 % | RESPIRATION RATE: 18 BRPM | TEMPERATURE: 98 F | BODY MASS INDEX: 35.63 KG/M2 | HEIGHT: 65 IN | DIASTOLIC BLOOD PRESSURE: 89 MMHG

## 2025-07-23 DIAGNOSIS — E11.49 TYPE 2 DIABETES MELLITUS WITH OTHER NEUROLOGIC COMPLICATION, WITHOUT LONG-TERM CURRENT USE OF INSULIN: ICD-10-CM

## 2025-07-23 DIAGNOSIS — E11.43 DIABETIC GASTROPARESIS: ICD-10-CM

## 2025-07-23 DIAGNOSIS — E11.65 UNCONTROLLED TYPE 2 DIABETES MELLITUS WITH HYPERGLYCEMIA: ICD-10-CM

## 2025-07-23 DIAGNOSIS — K31.84 DIABETIC GASTROPARESIS: ICD-10-CM

## 2025-07-23 DIAGNOSIS — E11.3313 TYPE 2 DIABETES MELLITUS WITH BOTH EYES AFFECTED BY MODERATE NONPROLIFERATIVE RETINOPATHY AND MACULAR EDEMA, WITH LONG-TERM CURRENT USE OF INSULIN: ICD-10-CM

## 2025-07-23 DIAGNOSIS — Z79.4 TYPE 2 DIABETES MELLITUS WITH BOTH EYES AFFECTED BY MODERATE NONPROLIFERATIVE RETINOPATHY AND MACULAR EDEMA, WITH LONG-TERM CURRENT USE OF INSULIN: ICD-10-CM

## 2025-07-23 DIAGNOSIS — D64.9 ANEMIA, UNSPECIFIED TYPE: ICD-10-CM

## 2025-07-23 DIAGNOSIS — Z79.4 TYPE 2 DIABETES MELLITUS WITH HYPERGLYCEMIA, WITH LONG-TERM CURRENT USE OF INSULIN: ICD-10-CM

## 2025-07-23 DIAGNOSIS — E08.65 DIABETES MELLITUS DUE TO UNDERLYING CONDITION, UNCONTROLLED, WITH HYPERGLYCEMIA: ICD-10-CM

## 2025-07-23 DIAGNOSIS — K31.84 GASTROPARESIS: ICD-10-CM

## 2025-07-23 DIAGNOSIS — E11.622 TYPE 2 DIABETES MELLITUS WITH OTHER SKIN ULCER (CODE): Primary | ICD-10-CM

## 2025-07-23 DIAGNOSIS — E66.01 SEVERE OBESITY (BMI 35.0-39.9) WITH COMORBIDITY: ICD-10-CM

## 2025-07-23 DIAGNOSIS — E11.65 TYPE 2 DIABETES MELLITUS WITH HYPERGLYCEMIA, WITH LONG-TERM CURRENT USE OF INSULIN: ICD-10-CM

## 2025-07-23 PROBLEM — E66.9 OBESITY: Status: ACTIVE | Noted: 2017-07-31

## 2025-07-23 LAB
ALBUMIN/CREAT UR: 22.9 UG/MG
ANION GAP (OHS): 11 MMOL/L (ref 8–16)
BUN SERPL-MCNC: 26 MG/DL (ref 6–20)
CALCIUM SERPL-MCNC: 10.1 MG/DL (ref 8.7–10.5)
CHLORIDE SERPL-SCNC: 107 MMOL/L (ref 95–110)
CHOLEST SERPL-MCNC: 330 MG/DL (ref 120–199)
CHOLEST/HDLC SERPL: 9.7 {RATIO} (ref 2–5)
CO2 SERPL-SCNC: 19 MMOL/L (ref 23–29)
CREAT SERPL-MCNC: 1.6 MG/DL (ref 0.5–1.4)
CREAT UR-MCNC: 70 MG/DL (ref 15–325)
EAG (OHS): 174 MG/DL (ref 68–131)
GFR SERPLBLD CREATININE-BSD FMLA CKD-EPI: 42 ML/MIN/1.73/M2
GLUCOSE SERPL-MCNC: 138 MG/DL (ref 70–110)
HBA1C MFR BLD: 7.7 % (ref 4–5.6)
HDLC SERPL-MCNC: 34 MG/DL (ref 40–75)
HDLC SERPL: 10.3 % (ref 20–50)
LDLC SERPL CALC-MCNC: ABNORMAL MG/DL
MICROALBUMIN UR-MCNC: 16 UG/ML (ref ?–5000)
NONHDLC SERPL-MCNC: 296 MG/DL
POTASSIUM SERPL-SCNC: 4.5 MMOL/L (ref 3.5–5.1)
SODIUM SERPL-SCNC: 137 MMOL/L (ref 136–145)
TRIGL SERPL-MCNC: 968 MG/DL (ref 30–150)
TSH SERPL-ACNC: 2.16 UIU/ML (ref 0.4–4)

## 2025-07-23 PROCEDURE — 82570 ASSAY OF URINE CREATININE: CPT

## 2025-07-23 PROCEDURE — 99214 OFFICE O/P EST MOD 30 MIN: CPT | Mod: S$GLB,,, | Performed by: STUDENT IN AN ORGANIZED HEALTH CARE EDUCATION/TRAINING PROGRAM

## 2025-07-23 PROCEDURE — 36415 COLL VENOUS BLD VENIPUNCTURE: CPT | Mod: PO

## 2025-07-23 PROCEDURE — 1160F RVW MEDS BY RX/DR IN RCRD: CPT | Mod: CPTII,S$GLB,, | Performed by: STUDENT IN AN ORGANIZED HEALTH CARE EDUCATION/TRAINING PROGRAM

## 2025-07-23 PROCEDURE — 85027 COMPLETE CBC AUTOMATED: CPT

## 2025-07-23 PROCEDURE — 83718 ASSAY OF LIPOPROTEIN: CPT

## 2025-07-23 PROCEDURE — 1159F MED LIST DOCD IN RCRD: CPT | Mod: CPTII,S$GLB,, | Performed by: STUDENT IN AN ORGANIZED HEALTH CARE EDUCATION/TRAINING PROGRAM

## 2025-07-23 PROCEDURE — 99999 PR PBB SHADOW E&M-EST. PATIENT-LVL V: CPT | Mod: PBBFAC,,, | Performed by: STUDENT IN AN ORGANIZED HEALTH CARE EDUCATION/TRAINING PROGRAM

## 2025-07-23 PROCEDURE — 83036 HEMOGLOBIN GLYCOSYLATED A1C: CPT

## 2025-07-23 PROCEDURE — G2211 COMPLEX E/M VISIT ADD ON: HCPCS | Mod: S$GLB,,, | Performed by: STUDENT IN AN ORGANIZED HEALTH CARE EDUCATION/TRAINING PROGRAM

## 2025-07-23 PROCEDURE — 82310 ASSAY OF CALCIUM: CPT

## 2025-07-23 PROCEDURE — 4010F ACE/ARB THERAPY RXD/TAKEN: CPT | Mod: CPTII,S$GLB,, | Performed by: STUDENT IN AN ORGANIZED HEALTH CARE EDUCATION/TRAINING PROGRAM

## 2025-07-23 PROCEDURE — 3008F BODY MASS INDEX DOCD: CPT | Mod: CPTII,S$GLB,, | Performed by: STUDENT IN AN ORGANIZED HEALTH CARE EDUCATION/TRAINING PROGRAM

## 2025-07-23 PROCEDURE — 84443 ASSAY THYROID STIM HORMONE: CPT

## 2025-07-23 PROCEDURE — 3075F SYST BP GE 130 - 139MM HG: CPT | Mod: CPTII,S$GLB,, | Performed by: STUDENT IN AN ORGANIZED HEALTH CARE EDUCATION/TRAINING PROGRAM

## 2025-07-23 PROCEDURE — 3044F HG A1C LEVEL LT 7.0%: CPT | Mod: CPTII,S$GLB,, | Performed by: STUDENT IN AN ORGANIZED HEALTH CARE EDUCATION/TRAINING PROGRAM

## 2025-07-23 PROCEDURE — 3079F DIAST BP 80-89 MM HG: CPT | Mod: CPTII,S$GLB,, | Performed by: STUDENT IN AN ORGANIZED HEALTH CARE EDUCATION/TRAINING PROGRAM

## 2025-07-23 RX ORDER — ATORVASTATIN CALCIUM 10 MG/1
10 TABLET, FILM COATED ORAL
COMMUNITY
End: 2025-07-24 | Stop reason: SDUPTHER

## 2025-07-23 RX ORDER — LOSARTAN POTASSIUM 25 MG/1
25 TABLET ORAL DAILY
Qty: 90 TABLET | Refills: 3 | Status: SHIPPED | OUTPATIENT
Start: 2025-07-23 | End: 2026-07-23

## 2025-07-23 RX ORDER — INSULIN GLARGINE 100 [IU]/ML
33 INJECTION, SOLUTION SUBCUTANEOUS NIGHTLY
Qty: 27 ML | Refills: 5 | Status: SHIPPED | OUTPATIENT
Start: 2025-07-23 | End: 2025-10-21

## 2025-07-23 RX ORDER — OMEPRAZOLE 40 MG/1
40 CAPSULE, DELAYED RELEASE ORAL EVERY MORNING
Qty: 90 CAPSULE | Refills: 3 | Status: SHIPPED | OUTPATIENT
Start: 2025-07-23

## 2025-07-23 RX ORDER — POLYETHYLENE GLYCOL 3350, SODIUM SULFATE ANHYDROUS, SODIUM BICARBONATE, SODIUM CHLORIDE, POTASSIUM CHLORIDE 236; 22.74; 6.74; 5.86; 2.97 G/4L; G/4L; G/4L; G/4L; G/4L
4000 POWDER, FOR SOLUTION ORAL ONCE
COMMUNITY
Start: 2025-05-13

## 2025-07-23 NOTE — PROGRESS NOTES
1. Type 2 diabetes mellitus with other skin ulcer (CODE)  Overview:  Resolved     Orders:  -     insulin glargine U-100, Lantus, (LANTUS SOLOSTAR U-100 INSULIN) 100 unit/mL (3 mL) InPn pen; Inject 33 Units into the skin every evening.  Dispense: 27 mL; Refill: 5    2. Type 2 diabetes mellitus with both eyes affected by moderate nonproliferative retinopathy and macular edema, with long-term current use of insulin  Overview:  Hx of retinopathy s/p improvement with better glucose control  Annual opto Follow up      Orders:  -     losartan (COZAAR) 25 MG tablet; Take 1 tablet (25 mg total) by mouth once daily.  Dispense: 90 tablet; Refill: 3  -     empagliflozin (JARDIANCE) 10 mg tablet; Take 1 tablet (10 mg total) by mouth once daily.  Dispense: 90 tablet; Refill: 3  -     insulin glargine U-100, Lantus, (LANTUS SOLOSTAR U-100 INSULIN) 100 unit/mL (3 mL) InPn pen; Inject 33 Units into the skin every evening.  Dispense: 27 mL; Refill: 5  -     MYC E-Visit    3. Gastroparesis  Overview:  Chronic hx. Currently well controlled, tolerating po - work on good dm control (see plan) - prn reglan - be sure to keep GI f/u  She is aware to take Reglan breaks every few weeks for 1-2 weeks.    Orders:  -     omeprazole (PRILOSEC) 40 MG capsule; Take 1 capsule (40 mg total) by mouth every morning.  Dispense: 90 capsule; Refill: 3    4. Severe obesity (BMI 35.0-39.9) with comorbidity  Overview:  Wt Readings from Last 3 Encounters:   07/23/25 1257 97 kg (213 lb 14.4 oz)   05/27/25 0941 90.7 kg (200 lb)   05/08/25 1239 95.3 kg (210 lb)       Diabetes Medications              insulin lispro (HUMALOG KWIKPEN INSULIN) 100 unit/mL pen Inject 2-8 Units into the skin every 4 (four) hours. USING CORRECTION SCALE. Check sugars before meals (breakfast, lunch, and dinner). Follow the sliding scale for meal-time/as needed insulin: BG Under 160=0 unit 160-200=2 units 201-250=4 units 251-300=6 units 301-350=8 units 351-400=10 units over 400=12  units    empagliflozin (JARDIANCE) 10 mg tablet Take 1 tablet (10 mg total) by mouth once daily.    insulin glargine U-100, Lantus, (LANTUS SOLOSTAR U-100 INSULIN) 100 unit/mL (3 mL) InPn pen Inject 33 Units into the skin every evening.            General weight loss/lifestyle modification strategies discussed:   limit sugary drinks, Informal exercise, exercise 3-5x per week               5. Type 2 diabetes mellitus with hyperglycemia, with long-term current use of insulin  Overview:  Well controlled via A1c; however, reports bgs inc 200-400 past couple of weeks  - inc to lantus 33u  - cont ssi  - start jardiance   Lab Results   Component Value Date    HGBA1C 6.9 (H) 03/31/2025       Hypoglycemic Events: none     -current meds:   Diabetes Medications              insulin lispro (HUMALOG KWIKPEN INSULIN) 100 unit/mL pen Inject 2-8 Units into the skin every 4 (four) hours. USING CORRECTION SCALE. Check sugars before meals (breakfast, lunch, and dinner). Follow the sliding scale for meal-time/as needed insulin: BG Under 160=0 unit 160-200=2 units 201-250=4 units 251-300=6 units 301-350=8 units 351-400=10 units over 400=12 units    empagliflozin (JARDIANCE) 10 mg tablet Take 1 tablet (10 mg total) by mouth once daily.    insulin glargine U-100, Lantus, (LANTUS SOLOSTAR U-100 INSULIN) 100 unit/mL (3 mL) InPn pen Inject 33 Units into the skin every evening.          -on statin:   Hyperlipidemia Medications              atorvastatin (LIPITOR) 10 MG tablet Take 10 mg by mouth.          -on ACE-I/ARB:   Hypertension Medications              losartan (COZAAR) 25 MG tablet Take 1 tablet (25 mg total) by mouth once daily.    metoprolol succinate (TOPROL-XL) 50 MG 24 hr tablet Take 1 tablet (50 mg total) by mouth once daily.          -counseling provided on importance of diabetic diet and medication compliance in order to treat diabetes  -discussed diabetes disease course and potential complications  Follow up 3 months        Orders:  -     insulin glargine U-100, Lantus, (LANTUS SOLOSTAR U-100 INSULIN) 100 unit/mL (3 mL) InPn pen; Inject 33 Units into the skin every evening.  Dispense: 27 mL; Refill: 5       - Follow up in 3 months.            Saskia Albert MD  _________________________________________________________________________      Patient ID: Olga Roger is a 39 y.o. female.    History of Present Illness    CHIEF COMPLAINT:  Patient presents today for elevated blood sugar.    DIABETES:  She reports elevated blood sugar over the past few days. Current fasting blood sugar is 162, with morning blood sugars around 200. She experienced a spike to 400 on Monday afternoon. Blood sugars have been trending in the 200s, which is higher than her previous baseline of 80-90. She denies knowing specific triggers for the recent elevation. She has previously seen a nutritionist for diabetes management. She is currently on insulin 30 units at night, with sliding scale of 2-8 units every four hours.    GASTROPARESIS:  She reports daily nausea, particularly before eating. She has been taking Domperidone for gastroparesis for approximately two months. She continues Omeprazole for acid reflux but reports minimal improvement in nausea.    DIET:  She consumes two 3-ounce servings of probiotic yogurt in the morning. Recent dinner consisted of steak and broccoli without potatoes. She occasionally drinks regular Sprite when dining out but typically avoids sugary drinks. She denies drinking juice and maintains hydration with water.    ANEMIA:  She reports ongoing anemia despite good iron levels, expressing confusion about this discrepancy. Recent medical consultation was unable to definitively explain the cause of her anemic condition.             Past medical histories reviewed, including past medical, surgical, family and social histories.      Medications Ordered Prior to Encounter[1]    Review of Systems   12 point review  "of systems negative except for listed in HPI.     Objective:    Nursing note and vitals reviewed.  Vitals:    07/23/25 1257   BP: 138/89   Pulse: 86   Resp: 18   Temp: 98.3 °F (36.8 °C)     Body mass index is 35.59 kg/m².     Physical Exam   Constitutional: oriented to person, place, and time. well-developed and well-nourished. No distress.   HENT: WNL  Head: Normocephalic and atraumatic.   Eyes: EOM are normal.   Neck: Normal range of motion. Neck supple.   Cardiovascular: Normal rate  Pulmonary/Chest: Effort normal. No respiratory distress.   GI: soft, non distended, no ttp, no rebound/guarding  Musculoskeletal: Normal range of motion. no edema.   Neurological: CN II-XII intact  Skin: warm and dry.   Psychiatric: normal mood and affect. behavior is normal.   Physical Exam                    We Offer Telehealth & Same Day Appointments!   Book your Telehealth appointment with me through my nurse or   Clinic appointments on "[employer]"!  [phone]     To Schedule appointments online, go to "[employer]": [email or web address]       Visit today included increased complexity associated with the care of the episodic problem addressed and managing the longitudinal care of the patient due to the serious and/or complex managed problem(s) as per problem list.     This note was generated with the assistance of ambient listening technology. Verbal consent was obtained by the patient and accompanying visitor(s) for the recording of patient appointment to facilitate this note. I attest to having reviewed and edited the generated note for accuracy, though some syntax or spelling errors may persist. Please contact the author of this note for any clarification.            [1]   Current Outpatient Medications on File Prior to Visit   Medication Sig Dispense Refill    atorvastatin (LIPITOR) 10 MG tablet Take 10 mg by mouth.      BD SOY 2ND GEN PEN NEEDLE 32 gauge x 5/32" Ndle use as directed      blood " sugar diagnostic Strp To check BG 3 times daily, to use with insurance preferred meter 100 each 0    blood-glucose meter kit To check BG 3 times daily, to use with insurance preferred meter 100 each 1    blood-glucose sensor (DEXCOM G7 SENSOR MISC) 3      blood-glucose sensor (DEXCOM G7 SENSOR) Hui 1 each by Misc.(Non-Drug; Combo Route) route every 10 days. 9 each 3    DEXCOM G7  Misc USE  ONCE DAILY 1 each 0    DOMPERIDONE, BULK, MISC 10 mg by Misc.(Non-Drug; Combo Route) route 3 (three) times daily as needed.      insulin lispro (HUMALOG KWIKPEN INSULIN) 100 unit/mL pen Inject 2-8 Units into the skin every 4 (four) hours. USING CORRECTION SCALE. Check sugars before meals (breakfast, lunch, and dinner). Follow the sliding scale for meal-time/as needed insulin: BG Under 160=0 unit 160-200=2 units 201-250=4 units 251-300=6 units 301-350=8 units 351-400=10 units over 400=12 units 14.4 mL 11    lancets Misc To check BG 3 times daily, to use with insurance preferred meter 100 each 1    metoclopramide HCl (REGLAN) 10 MG tablet Take 1 tablet (10 mg total) by mouth 3 (three) times daily before meals. 90 tablet 3    polyethylene glycol (GOLYTELY) 236-22.74-6.74 -5.86 gram suspension Take 4,000 mLs by mouth once.      pregabalin (LYRICA) 50 MG capsule TAKE 1 CAPSULE BY MOUTH THREE TIMES DAILY 90 capsule 2    [DISCONTINUED] amLODIPine (NORVASC) 5 MG tablet Take 5 mg by mouth.      [DISCONTINUED] insulin glargine U-100, Lantus, (LANTUS SOLOSTAR U-100 INSULIN) 100 unit/mL (3 mL) InPn pen Inject 30 Units into the skin every evening. 27 mL 5    [DISCONTINUED] naltrexone-bupropion (CONTRAVE) 8-90 mg TbSR Take 1 tablet by mouth 2 (two) times daily. 60 tablet 11    [DISCONTINUED] omeprazole (PRILOSEC) 40 MG capsule Take 1 capsule (40 mg total) by mouth every morning. 90 capsule 0    metoprolol succinate (TOPROL-XL) 50 MG 24 hr tablet Take 1 tablet (50 mg total) by mouth once daily. 90 tablet 3     Current  Facility-Administered Medications on File Prior to Visit   Medication Dose Route Frequency Provider Last Rate Last Admin    levonorgestreL (MIRENA) 20 mcg/24 hours (7 yrs) 52 mg IUD 1 Intra Uterine Device  1 Intra Uterine Device Intrauterine  Tino Garcia NP   1 Intra Uterine Device at 11/23/21 1400

## 2025-07-24 ENCOUNTER — TELEPHONE (OUTPATIENT)
Dept: FAMILY MEDICINE | Facility: CLINIC | Age: 40
End: 2025-07-24
Payer: COMMERCIAL

## 2025-07-24 LAB
ERYTHROCYTE [DISTWIDTH] IN BLOOD BY AUTOMATED COUNT: 12 % (ref 11.5–14.5)
HCT VFR BLD AUTO: 35.6 % (ref 37–48.5)
HGB BLD-MCNC: 10 GM/DL (ref 12–16)
MCH RBC QN AUTO: 22.9 PG (ref 27–31)
MCHC RBC AUTO-ENTMCNC: 28.1 G/DL (ref 32–36)
MCV RBC AUTO: 82 FL (ref 82–98)
PLATELET # BLD AUTO: 470 K/UL (ref 150–450)
PMV BLD AUTO: 10.6 FL (ref 9.2–12.9)
RBC # BLD AUTO: 4.36 M/UL (ref 4–5.4)
WBC # BLD AUTO: 8.97 K/UL (ref 3.9–12.7)

## 2025-07-24 NOTE — TELEPHONE ENCOUNTER
Copied from CRM #[id number]. Topic: General Inquiry - Return Call  >> Jul 24, 2025  4:35 PM Jessica wrote:  ...Type:  Patient Returning Call    Who Called: Olga Roger  What is the call regarding?: pt missed call and is returning the call.  Would the patient rather a call back or a response via [facility]? call  Best Call Back Number:  [phone] (home)  Additional Information:

## 2025-07-25 ENCOUNTER — TELEPHONE (OUTPATIENT)
Dept: FAMILY MEDICINE | Facility: CLINIC | Age: 40
End: 2025-07-25
Payer: COMMERCIAL

## 2025-07-25 NOTE — TELEPHONE ENCOUNTER
----- Message from Nurse Tatianna sent at 7/24/2025  4:43 PM CDT -----  Informed patient of the following results. Verbalized understanding. She stated that she has been out of the Atorvastatin for a few months. She sees nephrology in about 6 months. Repeat lab   scheduled.      Tatianna Hughes LPN   ----- Message -----  From: Saskia Albert MD  Sent: 7/24/2025   3:00 PM CDT  To: [facility] Staff      lipid, A1c, bmp     I have sent a msg to patient with the following interpretation (see below):  A1c elevated. Increasing to 33u lantus and starting jardiance, along with mealtime insulin should help. Repeat in 3m    Low blood count. Be sure to keep upcoming hematology appt    - Your cholesterol and triglycerides are HIGH. Increase to atorvastatin 20mg. Continue to limit fatty foods; participate in regular exercise. Repeat lipids in 3m     Kidney function reduced. When's your next nephrology appt?    -Dr. Albert    ----- Message -----  From: Lab, Background User  Sent: 7/23/2025   9:38 PM CDT  To: Saskia Albert MD

## 2025-08-04 LAB
LEFT EYE DM RETINOPATHY: POSITIVE
RIGHT EYE DM RETINOPATHY: POSITIVE

## 2025-08-12 ENCOUNTER — TELEPHONE (OUTPATIENT)
Dept: HEMATOLOGY/ONCOLOGY | Facility: CLINIC | Age: 40
End: 2025-08-12
Payer: COMMERCIAL

## 2025-08-15 ENCOUNTER — LAB VISIT (OUTPATIENT)
Dept: LAB | Facility: HOSPITAL | Age: 40
End: 2025-08-15
Attending: INTERNAL MEDICINE
Payer: COMMERCIAL

## 2025-08-15 DIAGNOSIS — D50.9 IRON DEFICIENCY ANEMIA: ICD-10-CM

## 2025-08-15 LAB
ABSOLUTE EOSINOPHIL (OHS): 0.18 K/UL
ABSOLUTE MONOCYTE (OHS): 0.7 K/UL (ref 0.3–1)
ABSOLUTE NEUTROPHIL COUNT (OHS): 5.39 K/UL (ref 1.8–7.7)
BASOPHILS # BLD AUTO: 0.07 K/UL
BASOPHILS NFR BLD AUTO: 0.8 %
ERYTHROCYTE [DISTWIDTH] IN BLOOD BY AUTOMATED COUNT: 12 % (ref 11.5–14.5)
FERRITIN SERPL-MCNC: 1091 NG/ML (ref 20–300)
HCT VFR BLD AUTO: 32.5 % (ref 37–48.5)
HGB BLD-MCNC: 9.6 GM/DL (ref 12–16)
IMM GRANULOCYTES # BLD AUTO: 0.02 K/UL (ref 0–0.04)
IMM GRANULOCYTES NFR BLD AUTO: 0.2 % (ref 0–0.5)
IRON SATN MFR SERPL: 19 % (ref 20–50)
IRON SERPL-MCNC: 63 UG/DL (ref 30–160)
LYMPHOCYTES # BLD AUTO: 2.76 K/UL (ref 1–4.8)
MCH RBC QN AUTO: 22.8 PG (ref 27–31)
MCHC RBC AUTO-ENTMCNC: 29.5 G/DL (ref 32–36)
MCV RBC AUTO: 77 FL (ref 82–98)
NUCLEATED RBC (/100WBC) (OHS): 0 /100 WBC
PLATELET # BLD AUTO: 284 K/UL (ref 150–450)
PMV BLD AUTO: 11.5 FL (ref 9.2–12.9)
RBC # BLD AUTO: 4.21 M/UL (ref 4–5.4)
RELATIVE EOSINOPHIL (OHS): 2 %
RELATIVE LYMPHOCYTE (OHS): 30.3 % (ref 18–48)
RELATIVE MONOCYTE (OHS): 7.7 % (ref 4–15)
RELATIVE NEUTROPHIL (OHS): 59 % (ref 38–73)
TIBC SERPL-MCNC: 337 UG/DL (ref 250–450)
TRANSFERRIN SERPL-MCNC: 228 MG/DL (ref 200–375)
WBC # BLD AUTO: 9.12 K/UL (ref 3.9–12.7)

## 2025-08-15 PROCEDURE — 84466 ASSAY OF TRANSFERRIN: CPT

## 2025-08-15 PROCEDURE — 85025 COMPLETE CBC W/AUTO DIFF WBC: CPT | Mod: PO

## 2025-08-15 PROCEDURE — 36415 COLL VENOUS BLD VENIPUNCTURE: CPT | Mod: PO

## 2025-08-15 PROCEDURE — 82728 ASSAY OF FERRITIN: CPT

## 2025-08-19 ENCOUNTER — LAB VISIT (OUTPATIENT)
Dept: LAB | Facility: HOSPITAL | Age: 40
End: 2025-08-19
Attending: INTERNAL MEDICINE
Payer: COMMERCIAL

## 2025-08-19 ENCOUNTER — OFFICE VISIT (OUTPATIENT)
Dept: HEMATOLOGY/ONCOLOGY | Facility: CLINIC | Age: 40
End: 2025-08-19
Payer: COMMERCIAL

## 2025-08-19 VITALS
BODY MASS INDEX: 36.36 KG/M2 | DIASTOLIC BLOOD PRESSURE: 87 MMHG | WEIGHT: 218.25 LBS | RESPIRATION RATE: 20 BRPM | TEMPERATURE: 97 F | HEART RATE: 99 BPM | OXYGEN SATURATION: 99 % | HEIGHT: 65 IN | SYSTOLIC BLOOD PRESSURE: 134 MMHG

## 2025-08-19 DIAGNOSIS — D50.9 IRON DEFICIENCY ANEMIA, UNSPECIFIED IRON DEFICIENCY ANEMIA TYPE: Primary | ICD-10-CM

## 2025-08-19 DIAGNOSIS — D50.9 IRON DEFICIENCY ANEMIA, UNSPECIFIED IRON DEFICIENCY ANEMIA TYPE: ICD-10-CM

## 2025-08-19 LAB
FOLATE SERPL-MCNC: 5.7 NG/ML (ref 4–24)
LDH SERPL-CCNC: 126 U/L (ref 110–260)
VIT B12 SERPL-MCNC: 817 PG/ML (ref 210–950)

## 2025-08-19 PROCEDURE — 83921 ORGANIC ACID SINGLE QUANT: CPT

## 2025-08-19 PROCEDURE — 82607 VITAMIN B-12: CPT

## 2025-08-19 PROCEDURE — 83020 HEMOGLOBIN ELECTROPHORESIS: CPT | Mod: 59

## 2025-08-19 PROCEDURE — 83020 HEMOGLOBIN ELECTROPHORESIS: CPT

## 2025-08-19 PROCEDURE — 85660 RBC SICKLE CELL TEST: CPT

## 2025-08-19 PROCEDURE — 84238 ASSAY NONENDOCRINE RECEPTOR: CPT

## 2025-08-19 PROCEDURE — 82525 ASSAY OF COPPER: CPT

## 2025-08-19 PROCEDURE — 83021 HEMOGLOBIN CHROMOTOGRAPHY: CPT

## 2025-08-19 PROCEDURE — 83789 MASS SPECTROMETRY QUAL/QUAN: CPT

## 2025-08-19 PROCEDURE — 83615 LACTATE (LD) (LDH) ENZYME: CPT

## 2025-08-19 PROCEDURE — 81256 HFE GENE: CPT

## 2025-08-19 PROCEDURE — 99999 PR PBB SHADOW E&M-EST. PATIENT-LVL V: CPT | Mod: PBBFAC,,, | Performed by: INTERNAL MEDICINE

## 2025-08-19 PROCEDURE — 83020 HEMOGLOBIN ELECTROPHORESIS: CPT | Mod: 26,,, | Performed by: PATHOLOGY

## 2025-08-19 PROCEDURE — 82746 ASSAY OF FOLIC ACID SERUM: CPT

## 2025-08-19 PROCEDURE — 36415 COLL VENOUS BLD VENIPUNCTURE: CPT

## 2025-08-20 LAB
HGB A2 MFR BLD HPLC: 3.5 % (ref 2.2–3.2)
HGB FRACT BLD ELPH-IMP: ABNORMAL
PATHOLOGIST INTERPRETATION - HGB SERUM (OHS): ABNORMAL

## 2025-08-21 LAB — STFR SERPL-MCNC: 3.5 MG/L (ref 1.8–4.6)

## 2025-08-22 LAB
GENETIC VARIANT DETAILS BLD/T: NORMAL
GENETICIST REVIEW: NORMAL
HGB S BLD QL SOLY: POSITIVE
LAB TEST METHOD: NORMAL
MOL DX INTERP BLD/T QL: NEGATIVE
PROVIDER SIGNING NAME: NORMAL
SPECIMEN SOURCE: NORMAL

## 2025-08-23 LAB — W METHYLMALONIC ACID: 0.3 UMOL/L

## 2025-08-25 LAB
FERRITIN SERPL IA-MCNC: 942 MCG/L (ref 6–175)
HGB A MFR BLD ELPH: 70.6 % (ref 95.8–98)
HGB A2 MFR BLD ELPH: 3.4 % (ref 2–3.3)
HGB F MFR BLD ELPH: 0 % (ref 0–0.9)
HGB FRACT BLD ELPH-IMP: ABNORMAL
HGB OTHER BLD: NORMAL
HGB XXX MFR BLD ELPH: ABNORMAL %
M HPLC HB VARIANT, B: ABNORMAL
PROVIDER SIGNING NAME: ABNORMAL
W COPPER: 1602 UG/L

## 2025-08-26 ENCOUNTER — TELEPHONE (OUTPATIENT)
Dept: HEMATOLOGY/ONCOLOGY | Facility: CLINIC | Age: 40
End: 2025-08-26
Payer: COMMERCIAL

## 2025-08-26 LAB — PATHOLOGIST INTERPRETATION - ACID HGB (OHS): NORMAL

## 2025-08-29 ENCOUNTER — PATIENT OUTREACH (OUTPATIENT)
Dept: ADMINISTRATIVE | Facility: HOSPITAL | Age: 40
End: 2025-08-29
Payer: COMMERCIAL

## 2025-09-03 ENCOUNTER — OFFICE VISIT (OUTPATIENT)
Dept: HEMATOLOGY/ONCOLOGY | Facility: CLINIC | Age: 40
End: 2025-09-03
Payer: COMMERCIAL

## 2025-09-03 DIAGNOSIS — D50.9 IRON DEFICIENCY ANEMIA, UNSPECIFIED IRON DEFICIENCY ANEMIA TYPE: Primary | ICD-10-CM
